# Patient Record
Sex: FEMALE | Race: BLACK OR AFRICAN AMERICAN | Employment: FULL TIME | URBAN - METROPOLITAN AREA
[De-identification: names, ages, dates, MRNs, and addresses within clinical notes are randomized per-mention and may not be internally consistent; named-entity substitution may affect disease eponyms.]

---

## 2017-01-09 ENCOUNTER — OFFICE VISIT (OUTPATIENT)
Dept: ENT CLINIC | Age: 52
End: 2017-01-09

## 2017-01-09 VITALS
WEIGHT: 148 LBS | BODY MASS INDEX: 24.66 KG/M2 | SYSTOLIC BLOOD PRESSURE: 101 MMHG | TEMPERATURE: 98.1 F | DIASTOLIC BLOOD PRESSURE: 58 MMHG | HEIGHT: 65 IN

## 2017-01-09 DIAGNOSIS — R49.9 HOARSENESS OR CHANGING VOICE: ICD-10-CM

## 2017-01-09 DIAGNOSIS — K21.9 LARYNGOPHARYNGEAL REFLUX (LPR): ICD-10-CM

## 2017-01-09 DIAGNOSIS — J00 ACUTE RHINITIS: ICD-10-CM

## 2017-01-09 PROCEDURE — 31575 DIAGNOSTIC LARYNGOSCOPY: CPT | Performed by: OTOLARYNGOLOGY

## 2017-01-09 RX ORDER — AZITHROMYCIN 250 MG/1
TABLET, FILM COATED ORAL
Qty: 1 PACKET | Refills: 0 | Status: SHIPPED | OUTPATIENT
Start: 2017-01-09 | End: 2017-01-19

## 2017-01-09 RX ORDER — OMEPRAZOLE 40 MG/1
40 CAPSULE, DELAYED RELEASE ORAL DAILY
Qty: 30 CAPSULE | Refills: 3 | Status: SHIPPED | OUTPATIENT
Start: 2017-01-09 | End: 2017-07-10

## 2017-02-20 ENCOUNTER — TELEPHONE (OUTPATIENT)
Dept: INTERNAL MEDICINE CLINIC | Age: 52
End: 2017-02-20

## 2017-07-10 ENCOUNTER — OFFICE VISIT (OUTPATIENT)
Dept: INTERNAL MEDICINE CLINIC | Age: 52
End: 2017-07-10

## 2017-07-10 VITALS
HEART RATE: 72 BPM | TEMPERATURE: 97.2 F | HEIGHT: 65 IN | DIASTOLIC BLOOD PRESSURE: 78 MMHG | BODY MASS INDEX: 24.91 KG/M2 | WEIGHT: 149.5 LBS | SYSTOLIC BLOOD PRESSURE: 120 MMHG | OXYGEN SATURATION: 99 %

## 2017-07-10 DIAGNOSIS — R35.0 URINARY FREQUENCY: ICD-10-CM

## 2017-07-10 DIAGNOSIS — E78.49 OTHER HYPERLIPIDEMIA: ICD-10-CM

## 2017-07-10 DIAGNOSIS — M54.16 ACUTE RADICULAR LOW BACK PAIN: Primary | ICD-10-CM

## 2017-07-10 DIAGNOSIS — E55.9 VITAMIN D DEFICIENCY: ICD-10-CM

## 2017-07-10 DIAGNOSIS — K59.09 OTHER CONSTIPATION: ICD-10-CM

## 2017-07-10 PROBLEM — K59.00 CONSTIPATION: Status: ACTIVE | Noted: 2017-07-10

## 2017-07-10 LAB
BILIRUBIN, POC: NORMAL
BLOOD URINE, POC: NORMAL
CLARITY, POC: CLEAR
COLOR, POC: YELLOW
GLUCOSE URINE, POC: NORMAL
KETONES, POC: NORMAL
LEUKOCYTE EST, POC: NORMAL
NITRITE, POC: NORMAL
PH, POC: 6
PROTEIN, POC: NORMAL
SPECIFIC GRAVITY, POC: 1.02
UROBILINOGEN, POC: 0.2

## 2017-07-10 PROCEDURE — 81002 URINALYSIS NONAUTO W/O SCOPE: CPT | Performed by: INTERNAL MEDICINE

## 2017-07-10 PROCEDURE — 96372 THER/PROPH/DIAG INJ SC/IM: CPT | Performed by: INTERNAL MEDICINE

## 2017-07-10 PROCEDURE — 99214 OFFICE O/P EST MOD 30 MIN: CPT | Performed by: INTERNAL MEDICINE

## 2017-07-10 RX ORDER — TRIAMCINOLONE ACETONIDE 40 MG/ML
40 INJECTION, SUSPENSION INTRA-ARTICULAR; INTRAMUSCULAR ONCE
Status: COMPLETED | OUTPATIENT
Start: 2017-07-10 | End: 2017-07-10

## 2017-07-10 RX ORDER — KETOROLAC TROMETHAMINE 30 MG/ML
60 INJECTION, SOLUTION INTRAMUSCULAR; INTRAVENOUS ONCE
Status: COMPLETED | OUTPATIENT
Start: 2017-07-10 | End: 2017-07-10

## 2017-07-10 RX ORDER — BACLOFEN 10 MG/1
10 TABLET ORAL 3 TIMES DAILY PRN
Qty: 30 TABLET | Refills: 0 | Status: SHIPPED | OUTPATIENT
Start: 2017-07-10 | End: 2017-08-30 | Stop reason: SDUPTHER

## 2017-07-10 RX ORDER — MULTIVIT-MIN/IRON/FOLIC ACID/K 18-600-40
1 CAPSULE ORAL DAILY
Qty: 30 CAPSULE | Refills: 1 | Status: SHIPPED | OUTPATIENT
Start: 2017-07-10 | End: 2017-10-09 | Stop reason: SDUPTHER

## 2017-07-10 RX ORDER — DICLOFENAC SODIUM 75 MG/1
75 TABLET, DELAYED RELEASE ORAL 2 TIMES DAILY PRN
Qty: 60 TABLET | Refills: 0 | Status: SHIPPED | OUTPATIENT
Start: 2017-07-10 | End: 2017-08-30 | Stop reason: SDUPTHER

## 2017-07-10 RX ADMIN — TRIAMCINOLONE ACETONIDE 40 MG: 40 INJECTION, SUSPENSION INTRA-ARTICULAR; INTRAMUSCULAR at 14:04

## 2017-07-10 RX ADMIN — KETOROLAC TROMETHAMINE 60 MG: 30 INJECTION, SOLUTION INTRAMUSCULAR; INTRAVENOUS at 14:03

## 2017-07-10 ASSESSMENT — PATIENT HEALTH QUESTIONNAIRE - PHQ9
SUM OF ALL RESPONSES TO PHQ9 QUESTIONS 1 & 2: 0
1. LITTLE INTEREST OR PLEASURE IN DOING THINGS: 0
2. FEELING DOWN, DEPRESSED OR HOPELESS: 0
SUM OF ALL RESPONSES TO PHQ QUESTIONS 1-9: 0

## 2017-07-11 ENCOUNTER — HOSPITAL ENCOUNTER (OUTPATIENT)
Dept: MRI IMAGING | Age: 52
Discharge: HOME | End: 2017-07-11
Attending: INTERNAL MEDICINE | Admitting: INTERNAL MEDICINE

## 2017-07-11 DIAGNOSIS — M54.16 ACUTE RADICULAR LOW BACK PAIN: ICD-10-CM

## 2017-07-11 DIAGNOSIS — M54.10 RADICULOPATHY: ICD-10-CM

## 2017-07-12 ENCOUNTER — TELEPHONE (OUTPATIENT)
Dept: INTERNAL MEDICINE CLINIC | Age: 52
End: 2017-07-12

## 2017-07-12 DIAGNOSIS — M54.16 ACUTE RADICULAR LOW BACK PAIN: Primary | ICD-10-CM

## 2017-07-14 ENCOUNTER — TELEPHONE (OUTPATIENT)
Dept: INTERNAL MEDICINE CLINIC | Age: 52
End: 2017-07-14

## 2017-07-14 RX ORDER — GABAPENTIN 300 MG/1
300 CAPSULE ORAL 2 TIMES DAILY
Qty: 60 CAPSULE | Refills: 0 | Status: SHIPPED | OUTPATIENT
Start: 2017-07-14 | End: 2017-08-30 | Stop reason: SDUPTHER

## 2017-07-24 ENCOUNTER — HOSPITAL ENCOUNTER (OUTPATIENT)
Dept: OTHER | Age: 52
Discharge: HOME | End: 2017-07-24
Attending: NEUROLOGICAL SURGERY | Admitting: NEUROLOGICAL SURGERY

## 2017-07-24 DIAGNOSIS — M54.5 CHRONIC MIDLINE LOW BACK PAIN, WITH SCIATICA PRESENCE UNSPECIFIED: ICD-10-CM

## 2017-07-24 DIAGNOSIS — G89.29 CHRONIC MIDLINE LOW BACK PAIN, WITH SCIATICA PRESENCE UNSPECIFIED: ICD-10-CM

## 2017-08-29 ENCOUNTER — PATIENT MESSAGE (OUTPATIENT)
Dept: INTERNAL MEDICINE CLINIC | Age: 52
End: 2017-08-29

## 2017-08-29 DIAGNOSIS — M54.16 ACUTE RADICULAR LOW BACK PAIN: ICD-10-CM

## 2017-08-30 RX ORDER — DICLOFENAC SODIUM 75 MG/1
75 TABLET, DELAYED RELEASE ORAL 2 TIMES DAILY PRN
Qty: 60 TABLET | Refills: 0 | Status: SHIPPED | OUTPATIENT
Start: 2017-08-30 | End: 2017-09-20

## 2017-08-30 RX ORDER — GABAPENTIN 300 MG/1
300 CAPSULE ORAL 2 TIMES DAILY
Qty: 60 CAPSULE | Refills: 0 | Status: SHIPPED | OUTPATIENT
Start: 2017-08-30 | End: 2017-09-20

## 2017-08-30 RX ORDER — BACLOFEN 10 MG/1
10 TABLET ORAL 3 TIMES DAILY PRN
Qty: 30 TABLET | Refills: 0 | Status: SHIPPED | OUTPATIENT
Start: 2017-08-30 | End: 2017-10-23

## 2017-09-19 ENCOUNTER — TELEPHONE (OUTPATIENT)
Dept: INTERNAL MEDICINE CLINIC | Age: 52
End: 2017-09-19

## 2017-09-20 ENCOUNTER — OFFICE VISIT (OUTPATIENT)
Dept: PRIMARY CARE CLINIC | Age: 52
End: 2017-09-20

## 2017-09-20 VITALS
WEIGHT: 147 LBS | HEART RATE: 69 BPM | RESPIRATION RATE: 18 BRPM | OXYGEN SATURATION: 100 % | TEMPERATURE: 98.5 F | DIASTOLIC BLOOD PRESSURE: 72 MMHG | BODY MASS INDEX: 24.46 KG/M2 | SYSTOLIC BLOOD PRESSURE: 108 MMHG

## 2017-09-20 DIAGNOSIS — N64.4 BREAST PAIN, LEFT: ICD-10-CM

## 2017-09-20 DIAGNOSIS — Z12.11 COLON CANCER SCREENING: ICD-10-CM

## 2017-09-20 DIAGNOSIS — Z00.00 VISIT FOR PREVENTIVE HEALTH EXAMINATION: Primary | ICD-10-CM

## 2017-09-20 DIAGNOSIS — Z12.31 VISIT FOR SCREENING MAMMOGRAM: ICD-10-CM

## 2017-09-20 DIAGNOSIS — Z12.4 CERVICAL CANCER SCREENING: ICD-10-CM

## 2017-09-20 DIAGNOSIS — R49.0 HOARSENESS OF VOICE: ICD-10-CM

## 2017-09-20 DIAGNOSIS — M54.32 LEFT SIDED SCIATICA: ICD-10-CM

## 2017-09-20 DIAGNOSIS — Z00.00 VISIT FOR PREVENTIVE HEALTH EXAMINATION: ICD-10-CM

## 2017-09-20 LAB
A/G RATIO: 2.1 (ref 1.1–2.2)
ALBUMIN SERPL-MCNC: 4.5 G/DL (ref 3.4–5)
ALP BLD-CCNC: 30 U/L (ref 40–129)
ALT SERPL-CCNC: 19 U/L (ref 10–40)
ANION GAP SERPL CALCULATED.3IONS-SCNC: 12 MMOL/L (ref 3–16)
AST SERPL-CCNC: 18 U/L (ref 15–37)
BACTERIA: ABNORMAL /HPF
BASOPHILS ABSOLUTE: 0 K/UL (ref 0–0.2)
BASOPHILS RELATIVE PERCENT: 0.5 %
BILIRUB SERPL-MCNC: 1.2 MG/DL (ref 0–1)
BILIRUBIN URINE: NEGATIVE
BLOOD, URINE: NEGATIVE
BUN BLDV-MCNC: 16 MG/DL (ref 7–20)
CALCIUM SERPL-MCNC: 9.4 MG/DL (ref 8.3–10.6)
CHLORIDE BLD-SCNC: 102 MMOL/L (ref 99–110)
CHOLESTEROL, TOTAL: 234 MG/DL (ref 0–199)
CLARITY: ABNORMAL
CO2: 28 MMOL/L (ref 21–32)
COLOR: ABNORMAL
CREAT SERPL-MCNC: 0.5 MG/DL (ref 0.6–1.1)
EOSINOPHILS ABSOLUTE: 0 K/UL (ref 0–0.6)
EOSINOPHILS RELATIVE PERCENT: 1.3 %
EPITHELIAL CELLS, UA: 12 /HPF (ref 0–5)
GFR AFRICAN AMERICAN: >60
GFR NON-AFRICAN AMERICAN: >60
GLOBULIN: 2.1 G/DL
GLUCOSE BLD-MCNC: 80 MG/DL (ref 70–99)
GLUCOSE URINE: NEGATIVE MG/DL
HCT VFR BLD CALC: 41 % (ref 36–48)
HDLC SERPL-MCNC: 117 MG/DL (ref 40–60)
HEMOGLOBIN: 13.5 G/DL (ref 12–16)
HYALINE CASTS: 7 /LPF (ref 0–8)
KETONES, URINE: NEGATIVE MG/DL
LDL CHOLESTEROL CALCULATED: 104 MG/DL
LEUKOCYTE ESTERASE, URINE: NEGATIVE
LYMPHOCYTES ABSOLUTE: 1.4 K/UL (ref 1–5.1)
LYMPHOCYTES RELATIVE PERCENT: 43.6 %
MCH RBC QN AUTO: 31.4 PG (ref 26–34)
MCHC RBC AUTO-ENTMCNC: 32.9 G/DL (ref 31–36)
MCV RBC AUTO: 95.2 FL (ref 80–100)
MICROSCOPIC EXAMINATION: YES
MONOCYTES ABSOLUTE: 0.2 K/UL (ref 0–1.3)
MONOCYTES RELATIVE PERCENT: 7.5 %
NEUTROPHILS ABSOLUTE: 1.5 K/UL (ref 1.7–7.7)
NEUTROPHILS RELATIVE PERCENT: 47.1 %
NITRITE, URINE: NEGATIVE
PDW BLD-RTO: 14.1 % (ref 12.4–15.4)
PH UA: 6
PLATELET # BLD: 218 K/UL (ref 135–450)
PMV BLD AUTO: 8 FL (ref 5–10.5)
POTASSIUM SERPL-SCNC: 4.4 MMOL/L (ref 3.5–5.1)
PROTEIN UA: ABNORMAL MG/DL
RBC # BLD: 4.31 M/UL (ref 4–5.2)
RBC UA: 2 /HPF (ref 0–4)
SODIUM BLD-SCNC: 142 MMOL/L (ref 136–145)
SPECIFIC GRAVITY UA: 1.03
TOTAL PROTEIN: 6.6 G/DL (ref 6.4–8.2)
TRIGL SERPL-MCNC: 66 MG/DL (ref 0–150)
TSH REFLEX FT4: 1.64 UIU/ML (ref 0.27–4.2)
URINE TYPE: ABNORMAL
UROBILINOGEN, URINE: 0.2 E.U./DL
VITAMIN B-12: 974 PG/ML (ref 211–911)
VITAMIN D 25-HYDROXY: 41.6 NG/ML
VLDLC SERPL CALC-MCNC: 13 MG/DL
WBC # BLD: 3.2 K/UL (ref 4–11)
WBC UA: 7 /HPF (ref 0–5)

## 2017-09-20 PROCEDURE — 99396 PREV VISIT EST AGE 40-64: CPT | Performed by: INTERNAL MEDICINE

## 2017-09-20 RX ORDER — CELECOXIB 200 MG/1
200 CAPSULE ORAL DAILY
Qty: 30 CAPSULE | Refills: 3 | Status: SHIPPED | OUTPATIENT
Start: 2017-09-20 | End: 2017-10-09

## 2017-09-20 RX ORDER — TOPIRAMATE 50 MG/1
50 TABLET, FILM COATED ORAL 2 TIMES DAILY
Qty: 60 TABLET | Refills: 3 | Status: SHIPPED | OUTPATIENT
Start: 2017-09-20 | End: 2017-10-09 | Stop reason: SDUPTHER

## 2017-09-20 ASSESSMENT — ENCOUNTER SYMPTOMS
BACK PAIN: 1
RESPIRATORY NEGATIVE: 1
EYES NEGATIVE: 1
GASTROINTESTINAL NEGATIVE: 1
ALLERGIC/IMMUNOLOGIC NEGATIVE: 1

## 2017-09-20 ASSESSMENT — PATIENT HEALTH QUESTIONNAIRE - PHQ9
1. LITTLE INTEREST OR PLEASURE IN DOING THINGS: 0
SUM OF ALL RESPONSES TO PHQ9 QUESTIONS 1 & 2: 0
2. FEELING DOWN, DEPRESSED OR HOPELESS: 0
SUM OF ALL RESPONSES TO PHQ QUESTIONS 1-9: 0

## 2017-09-21 DIAGNOSIS — R82.81 PYURIA: Primary | ICD-10-CM

## 2017-09-21 LAB
ESTIMATED AVERAGE GLUCOSE: 108.3 MG/DL
HBA1C MFR BLD: 5.4 %

## 2017-09-29 DIAGNOSIS — M54.32 LEFT SIDED SCIATICA: Primary | ICD-10-CM

## 2017-10-03 DIAGNOSIS — L29.9 ITCHING: Primary | ICD-10-CM

## 2017-10-03 DIAGNOSIS — R17 ELEVATED BILIRUBIN: ICD-10-CM

## 2017-10-03 RX ORDER — MOMETASONE FUROATE 50 UG/1
2 SPRAY, METERED NASAL DAILY
Qty: 1 INHALER | Refills: 1 | Status: SHIPPED | OUTPATIENT
Start: 2017-10-03 | End: 2017-10-09 | Stop reason: SDUPTHER

## 2017-10-03 RX ORDER — CETIRIZINE HYDROCHLORIDE 10 MG/1
10 TABLET ORAL DAILY PRN
Qty: 30 TABLET | Refills: 3 | Status: SHIPPED | OUTPATIENT
Start: 2017-10-03 | End: 2017-10-09 | Stop reason: SDUPTHER

## 2017-10-03 RX ORDER — RANITIDINE 150 MG/1
150 TABLET ORAL 2 TIMES DAILY
Qty: 60 TABLET | Refills: 3 | Status: SHIPPED | OUTPATIENT
Start: 2017-10-03 | End: 2017-10-09 | Stop reason: SDUPTHER

## 2017-10-09 DIAGNOSIS — M54.16 ACUTE RADICULAR LOW BACK PAIN: ICD-10-CM

## 2017-10-09 DIAGNOSIS — R17 ELEVATED BILIRUBIN: ICD-10-CM

## 2017-10-09 DIAGNOSIS — M54.32 LEFT SIDED SCIATICA: ICD-10-CM

## 2017-10-09 DIAGNOSIS — L29.9 ITCHING: ICD-10-CM

## 2017-10-09 DIAGNOSIS — E55.9 VITAMIN D DEFICIENCY: ICD-10-CM

## 2017-10-09 LAB
BILIRUB SERPL-MCNC: 1.4 MG/DL (ref 0–1)
BILIRUBIN DIRECT: 0.3 MG/DL (ref 0–0.3)
BILIRUBIN, INDIRECT: 1.1 MG/DL (ref 0–1)

## 2017-10-09 RX ORDER — MULTIVIT-MIN/IRON/FOLIC ACID/K 18-600-40
1 CAPSULE ORAL DAILY
Qty: 30 CAPSULE | Refills: 1 | Status: SHIPPED | OUTPATIENT
Start: 2017-10-09 | End: 2018-06-29 | Stop reason: SDUPTHER

## 2017-10-09 RX ORDER — MOMETASONE FUROATE 50 UG/1
2 SPRAY, METERED NASAL DAILY
Qty: 1 INHALER | Refills: 1 | Status: SHIPPED | OUTPATIENT
Start: 2017-10-09 | End: 2018-09-05

## 2017-10-09 RX ORDER — RANITIDINE 150 MG/1
150 TABLET ORAL 2 TIMES DAILY
Qty: 60 TABLET | Refills: 3 | Status: SHIPPED | OUTPATIENT
Start: 2017-10-09 | End: 2018-02-22 | Stop reason: SDUPTHER

## 2017-10-09 RX ORDER — TOPIRAMATE 50 MG/1
50 TABLET, FILM COATED ORAL 2 TIMES DAILY
Qty: 60 TABLET | Refills: 3 | Status: SHIPPED | OUTPATIENT
Start: 2017-10-09 | End: 2017-10-19 | Stop reason: SDUPTHER

## 2017-10-09 RX ORDER — CETIRIZINE HYDROCHLORIDE 10 MG/1
10 TABLET ORAL DAILY PRN
Qty: 30 TABLET | Refills: 3 | Status: SHIPPED | OUTPATIENT
Start: 2017-10-09 | End: 2017-12-28 | Stop reason: SDUPTHER

## 2017-10-11 DIAGNOSIS — M54.32 LEFT SIDED SCIATICA: Primary | ICD-10-CM

## 2017-10-11 LAB
2000687N OAK TREE IGE: 1.12 KU/L
ALLERGEN ASPERGILLUS ALTERNATA IGE: <0.1 KU/L
ALLERGEN ASPERGILLUS FUMIGATUS IGE: <0.1 KU/L
ALLERGEN BERMUDA GRASS IGE: 1.08 KU/L
ALLERGEN BIRCH IGE: 1.19 KU/L
ALLERGEN CAT DANDER IGE: 0.38 KU/L
ALLERGEN CLAMS IGE: <0.1 KU/L
ALLERGEN CODFISH IGE: <0.1 KU/L
ALLERGEN COMMON SHORT RAGWEED IGE: 1.46 KU/L
ALLERGEN CORN IGE: 0.19 KU/L
ALLERGEN COTTONWOOD: 1.21 KU/L
ALLERGEN COW MILK IGE: <0.1 KU/L
ALLERGEN DOG DANDER IGE: <0.1 KU/L
ALLERGEN EGG WHITE IGE: <0.1 KU/L
ALLERGEN ELM IGE: 1.54 KU/L
ALLERGEN FUNGI/MOLD M.RACEMOSUS IGE: <0.1 KU/L
ALLERGEN GERMAN COCKROACH IGE: <0.1 KU/L
ALLERGEN HORMODENDRUM HORDEI IGE: <0.1 KU/L
ALLERGEN MAPLE/BOX ELDER IGE: 4.14 KU/L
ALLERGEN MITE DUST FARINAE IGE: <0.1 KU/L
ALLERGEN MITE DUST PTERONYSSINUS IGE: <0.1 KU/L
ALLERGEN MOUNTAIN CEDAR: 0.28 KU/L
ALLERGEN MOUSE EPITHELIA IGE: <0.1 KU/L
ALLERGEN PEANUT (F13) IGE: 0.43 KU/L
ALLERGEN PECAN TREE IGE: 2.58 KU/L
ALLERGEN PENICILLIUM NOTATUM: <0.1 KU/L
ALLERGEN ROUGH PIGWEED (W14) IGE: 0.95 KU/L
ALLERGEN RUSSIAN THISTLE IGE: 0.77 KU/L
ALLERGEN SCALLOP IGE: <0.1 KU/L
ALLERGEN SEE NOTE: ABNORMAL
ALLERGEN SHEEP SORREL (W18) IGE: 1.25 KU/L
ALLERGEN SHRIMP IGE: <0.1 KU/L
ALLERGEN SOYBEAN IGE: <0.1 KU/L
ALLERGEN TIMOTHY GRASS: 0.6 KU/L
ALLERGEN TREE SYCAMORE: 0.83 KU/L
ALLERGEN WALNUT IGE: 0.11 KU/L
ALLERGEN WALNUT TREE IGE: 2.22 KU/L
ALLERGEN WHEAT IGE: 0.17 KU/L
ALLERGEN WHITE MULBERRY TREE, IGE: <0.1 KU/L
ALLERGEN, TREE, WHITE ASH IGE: 3.35 KU/L
IGE: 32 KU/L

## 2017-10-12 ENCOUNTER — TELEPHONE (OUTPATIENT)
Dept: PRIMARY CARE CLINIC | Age: 52
End: 2017-10-12

## 2017-10-12 NOTE — TELEPHONE ENCOUNTER
Patient wants to know if you can give her something for pain or a shot. Pain is crying stating she is excruciating pain.   Please advise

## 2017-10-19 DIAGNOSIS — M54.32 LEFT SIDED SCIATICA: ICD-10-CM

## 2017-10-19 RX ORDER — TOPIRAMATE 50 MG/1
50 TABLET, FILM COATED ORAL 2 TIMES DAILY
Qty: 60 TABLET | Refills: 3 | Status: SHIPPED | OUTPATIENT
Start: 2017-10-19 | End: 2017-10-23

## 2017-10-19 NOTE — TELEPHONE ENCOUNTER
From: Teri Donaldson  Sent: 10/19/2017 10:34 AM EDT  Subject: Medication Renewal Request    Teri Donaldson would like a refill of the following medications:  topiramate (TOPAMAX) 50 MG tablet Jez Wood MD]    Preferred pharmacy: [facility] [phone] - F [phone]    Comment:  I need more refills on baclofen and Topamax please. Thanks Dr. Rani Madrid.     Medication renewals requested in this message routed separately:  baclofen (LIORESAL) 10 MG tablet Niles Lopez MD]

## 2017-10-20 RX ORDER — BACLOFEN 10 MG/1
10 TABLET ORAL 3 TIMES DAILY
Qty: 90 TABLET | Refills: 5 | Status: SHIPPED | OUTPATIENT
Start: 2017-10-20 | End: 2017-12-06

## 2017-10-23 ENCOUNTER — OFFICE VISIT (OUTPATIENT)
Dept: PRIMARY CARE CLINIC | Age: 52
End: 2017-10-23

## 2017-10-23 VITALS
RESPIRATION RATE: 18 BRPM | WEIGHT: 141 LBS | DIASTOLIC BLOOD PRESSURE: 78 MMHG | OXYGEN SATURATION: 100 % | TEMPERATURE: 97.8 F | BODY MASS INDEX: 23.46 KG/M2 | HEART RATE: 71 BPM | SYSTOLIC BLOOD PRESSURE: 120 MMHG

## 2017-10-23 DIAGNOSIS — Z01.818 PRE-OP EXAMINATION: Primary | ICD-10-CM

## 2017-10-23 DIAGNOSIS — R06.02 SHORTNESS OF BREATH: ICD-10-CM

## 2017-10-23 DIAGNOSIS — M54.32 LEFT SIDED SCIATICA: ICD-10-CM

## 2017-10-23 LAB
A/G RATIO: 2.3 (ref 1.1–2.2)
ALBUMIN SERPL-MCNC: 4.8 G/DL (ref 3.4–5)
ALP BLD-CCNC: 33 U/L (ref 40–129)
ALT SERPL-CCNC: 20 U/L (ref 10–40)
ANION GAP SERPL CALCULATED.3IONS-SCNC: 14 MMOL/L (ref 3–16)
AST SERPL-CCNC: 15 U/L (ref 15–37)
BASOPHILS ABSOLUTE: 0 K/UL (ref 0–0.2)
BASOPHILS RELATIVE PERCENT: 0.6 %
BILIRUB SERPL-MCNC: 1.2 MG/DL (ref 0–1)
BILIRUBIN URINE: NEGATIVE
BLOOD, URINE: NEGATIVE
BUN BLDV-MCNC: 16 MG/DL (ref 7–20)
CALCIUM SERPL-MCNC: 9.6 MG/DL (ref 8.3–10.6)
CHLORIDE BLD-SCNC: 104 MMOL/L (ref 99–110)
CLARITY: CLEAR
CO2: 25 MMOL/L (ref 21–32)
COLOR: YELLOW
CREAT SERPL-MCNC: 0.6 MG/DL (ref 0.6–1.1)
D DIMER: <200 NG/ML DDU (ref 0–229)
EOSINOPHILS ABSOLUTE: 0 K/UL (ref 0–0.6)
EOSINOPHILS RELATIVE PERCENT: 0.8 %
GFR AFRICAN AMERICAN: >60
GFR NON-AFRICAN AMERICAN: >60
GLOBULIN: 2.1 G/DL
GLUCOSE BLD-MCNC: 88 MG/DL (ref 70–99)
GLUCOSE URINE: NEGATIVE MG/DL
HCT VFR BLD CALC: 42.4 % (ref 36–48)
HEMOGLOBIN: 14.2 G/DL (ref 12–16)
KETONES, URINE: NEGATIVE MG/DL
LEUKOCYTE ESTERASE, URINE: NEGATIVE
LYMPHOCYTES ABSOLUTE: 2.4 K/UL (ref 1–5.1)
LYMPHOCYTES RELATIVE PERCENT: 48.8 %
MCH RBC QN AUTO: 31.2 PG (ref 26–34)
MCHC RBC AUTO-ENTMCNC: 33.6 G/DL (ref 31–36)
MCV RBC AUTO: 93 FL (ref 80–100)
MICROSCOPIC EXAMINATION: NORMAL
MONOCYTES ABSOLUTE: 0.3 K/UL (ref 0–1.3)
MONOCYTES RELATIVE PERCENT: 6.6 %
NEUTROPHILS ABSOLUTE: 2.1 K/UL (ref 1.7–7.7)
NEUTROPHILS RELATIVE PERCENT: 43.2 %
NITRITE, URINE: NEGATIVE
PDW BLD-RTO: 12.9 % (ref 12.4–15.4)
PH UA: 6
PLATELET # BLD: 265 K/UL (ref 135–450)
PMV BLD AUTO: 8.2 FL (ref 5–10.5)
POTASSIUM SERPL-SCNC: 3.9 MMOL/L (ref 3.5–5.1)
PROTEIN UA: NEGATIVE MG/DL
RBC # BLD: 4.56 M/UL (ref 4–5.2)
SODIUM BLD-SCNC: 143 MMOL/L (ref 136–145)
SPECIFIC GRAVITY UA: 1.03
TOTAL PROTEIN: 6.9 G/DL (ref 6.4–8.2)
URINE TYPE: NORMAL
UROBILINOGEN, URINE: 0.2 E.U./DL
WBC # BLD: 4.9 K/UL (ref 4–11)

## 2017-10-23 PROCEDURE — 99242 OFF/OP CONSLTJ NEW/EST SF 20: CPT | Performed by: INTERNAL MEDICINE

## 2017-10-23 RX ORDER — TOPIRAMATE 100 MG/1
100 TABLET, FILM COATED ORAL 2 TIMES DAILY
Qty: 60 TABLET | Refills: 5 | Status: SHIPPED | OUTPATIENT
Start: 2017-10-23 | End: 2017-12-06 | Stop reason: SDUPTHER

## 2017-10-23 ASSESSMENT — ENCOUNTER SYMPTOMS
BACK PAIN: 1
COUGH: 0
SHORTNESS OF BREATH: 1
GASTROINTESTINAL NEGATIVE: 1
EYES NEGATIVE: 1

## 2017-10-23 NOTE — PROGRESS NOTES
distress. She has no wheezes. She has no rales. She exhibits no tenderness. Normal breast exam.   Abdominal: Soft. Bowel sounds are normal. She exhibits no distension and no mass. There is no tenderness. There is no rebound and no guarding. Musculoskeletal: She exhibits no edema, tenderness or deformity. Lymphadenopathy:     She has no cervical adenopathy. Neurological: She is alert and oriented to person, place, and time. She displays normal reflexes. No cranial nerve deficit. She exhibits normal muscle tone. Coordination normal.   Skin: Skin is warm and dry. No rash noted. She is not diaphoretic. No erythema. No pallor. Psychiatric: She has a normal mood and affect. Her behavior is normal. Judgment and thought content normal.       Assessment:      1. Pre-op examination , due to shortness of breath, will check chest xray, pft's, echo, ekg. To further evaluate. Lab Results   Component Value Date    CHOL 234 (H) 09/20/2017    CHOL 205 (H) 10/24/2016    CHOL 253 (H) 02/12/2016     Lab Results   Component Value Date    TRIG 66 09/20/2017    TRIG 46 10/24/2016    TRIG 35 02/12/2016     Lab Results   Component Value Date     (H) 09/20/2017     (H) 10/24/2016     (H) 02/12/2016     Lab Results   Component Value Date    LDLCALC 104 (H) 09/20/2017    LDLCALC 91 10/24/2016    LDLCALC 139 (H) 02/12/2016     Lab Results   Component Value Date    LABVLDL 13 09/20/2017    LABVLDL 9 10/24/2016    LABVLDL 7 02/12/2016           2. Shortness of breath , see above. 3. Left sided sciatica . Proceed with surgery , once pre-op clearance completed. Plan:   Walker Cast received counseling on the following healthy behaviors: medication adherence    Patient given educational materials on After visit summary with diagnosis and treatment plan.       I have instructed Nolanon Serene to complete a self tracking handout on Blood Pressures  and Weights and instructed them to bring it with them to her next appointment. Discussed use, benefit, and side effects of prescribed medications. Barriers to medication compliance addressed. All patient questions answered. Pt voiced understanding. Patient is taking over the counter meds and discussed as to how they interact with prescription medications.

## 2017-10-24 RX ORDER — ALBUTEROL SULFATE 90 UG/1
2 AEROSOL, METERED RESPIRATORY (INHALATION) EVERY 6 HOURS PRN
Qty: 1 INHALER | Refills: 3 | Status: SHIPPED | OUTPATIENT
Start: 2017-10-24 | End: 2020-08-03 | Stop reason: SDUPTHER

## 2017-10-25 DIAGNOSIS — R17 ELEVATED BILIRUBIN: Primary | ICD-10-CM

## 2017-10-27 ENCOUNTER — HOSPITAL ENCOUNTER (OUTPATIENT)
Dept: NON INVASIVE DIAGNOSTICS | Age: 52
Discharge: HOME | End: 2017-10-27
Attending: PHYSICIAN ASSISTANT | Admitting: PHYSICIAN ASSISTANT

## 2017-10-27 DIAGNOSIS — Z01.818 ENCOUNTER FOR OTHER PREPROCEDURAL EXAMINATION: ICD-10-CM

## 2017-10-27 LAB
EKG ATRIAL RATE: 63 BPM
EKG DIAGNOSIS: NORMAL
EKG P AXIS: 48 DEGREES
EKG P-R INTERVAL: 158 MS
EKG Q-T INTERVAL: 404 MS
EKG QRS DURATION: 82 MS
EKG QTC CALCULATION (BAZETT): 413 MS
EKG R AXIS: -10 DEGREES
EKG T AXIS: 26 DEGREES
EKG VENTRICULAR RATE: 63 BPM
LV EF: 63 %
LVEF MODALITY: NORMAL

## 2017-10-27 PROCEDURE — 93010 ELECTROCARDIOGRAM REPORT: CPT | Performed by: INTERNAL MEDICINE

## 2017-10-30 ENCOUNTER — HOSPITAL ENCOUNTER (OUTPATIENT)
Dept: PULMONOLOGY | Age: 52
Discharge: HOME | End: 2017-10-30
Attending: INTERNAL MEDICINE | Admitting: INTERNAL MEDICINE

## 2017-10-30 DIAGNOSIS — Z01.818 ENCOUNTER FOR OTHER PREPROCEDURAL EXAMINATION: ICD-10-CM

## 2017-10-30 DIAGNOSIS — Z01.818 PRE-OP EXAMINATION: ICD-10-CM

## 2017-10-30 LAB
DLCO %PRED: NORMAL
DLCO PRE: NORMAL
FEF 25-75%-POST: NORMAL
FEF 25-75%-PRE: NORMAL
FEV1-POST: NORMAL
FEV1-PRE: NORMAL
FEV1/FVC-POST: NORMAL
FEV1/FVC-PRE: NORMAL
FVC-POST: NORMAL
FVC-PRE: NORMAL
MEP: NORMAL
MIP: NORMAL
MVV %PRED-PRE: NORMAL
MVV-PRE: NORMAL
TLC %PRED: NORMAL
TLC PRE: NORMAL

## 2017-10-30 PROCEDURE — 94060 EVALUATION OF WHEEZING: CPT | Performed by: INTERNAL MEDICINE

## 2017-10-30 PROCEDURE — 94729 DIFFUSING CAPACITY: CPT | Performed by: INTERNAL MEDICINE

## 2017-10-30 PROCEDURE — 94727 GAS DIL/WSHOT DETER LNG VOL: CPT | Performed by: INTERNAL MEDICINE

## 2017-10-30 RX ORDER — ALBUTEROL SULFATE 90 UG/1
4 AEROSOL, METERED RESPIRATORY (INHALATION) ONCE
Status: COMPLETED | OUTPATIENT
Start: 2017-10-30 | End: 2017-10-30

## 2017-10-30 RX ADMIN — ALBUTEROL SULFATE 4 PUFF: 90 AEROSOL, METERED RESPIRATORY (INHALATION) at 09:25

## 2017-10-30 NOTE — PROCEDURES
Spirometry  1. Spirometry is normal.  2. There is no demonstrable obstructive defect. Lung Volumes  3. Lung volumes are normal.    Bronchodilator  1. There is a borderline response to bronchodilator. Flow-Volume Loop  4. The flow-volume loop is normal.    Diffusing Capacity  5. Diffusing capacity is normal.                  Overall Interpretation  No obstruction is present    No restriction is present    There is a borderline bronchodilator response present    Diffusion capacity is normal    FEV1 is 2.5 L at 99% predicted. FEV1/FVC ratio is 84    Normal study        OBSTRUCTION % Predicted FEV1   MILD >70%   MODERATE 60-69%   MODERATELY-SEVERE 50-59%   SEVERE 35-49%   VERY SEVERE <35%         RESTRICTION % Predicted TLC   MILD Less than LLN but > than 70%   MODERATE 60-69%   MODERATELY-SEVERE <60%                 DIFFUSION CAPACITY DL,CO % Pred   MILD >60% AND < LLN   MODERATE 40-60%   SEVERE <40%       PFT data will be scanned into the media tab in epic.     Yuni Shankar 112 Pulmonology

## 2017-11-02 ENCOUNTER — TELEPHONE (OUTPATIENT)
Dept: PRIMARY CARE CLINIC | Age: 52
End: 2017-11-02

## 2017-11-02 NOTE — TELEPHONE ENCOUNTER
Pt states she was recently in the ED, she passed out at work, she would like a call back to discuss with Dr. Katie Mojica

## 2017-11-02 NOTE — TELEPHONE ENCOUNTER
Pt wants to talk to you to see if you want her to stop taking topamax. She was having SOB and just passed out and she thinks its because of this med. She has a appt on mon and wonders if you want her to come in sooner to discuss.  She said she would like to talk to you personally

## 2017-11-03 ENCOUNTER — OFFICE VISIT (OUTPATIENT)
Dept: PRIMARY CARE CLINIC | Age: 52
End: 2017-11-03

## 2017-11-03 VITALS
WEIGHT: 138 LBS | SYSTOLIC BLOOD PRESSURE: 98 MMHG | TEMPERATURE: 98.6 F | OXYGEN SATURATION: 100 % | DIASTOLIC BLOOD PRESSURE: 60 MMHG | BODY MASS INDEX: 22.96 KG/M2 | HEART RATE: 90 BPM | RESPIRATION RATE: 18 BRPM

## 2017-11-03 DIAGNOSIS — R49.0 HOARSENESS: ICD-10-CM

## 2017-11-03 DIAGNOSIS — R55 SYNCOPE, UNSPECIFIED SYNCOPE TYPE: Primary | ICD-10-CM

## 2017-11-03 DIAGNOSIS — E87.6 HYPOKALEMIA: ICD-10-CM

## 2017-11-03 LAB
ALBUMIN SERPL-MCNC: 4.4 G/DL (ref 3.4–5)
ANION GAP SERPL CALCULATED.3IONS-SCNC: 11 MMOL/L (ref 3–16)
BUN BLDV-MCNC: 15 MG/DL (ref 7–20)
CALCIUM SERPL-MCNC: 9.4 MG/DL (ref 8.3–10.6)
CHLORIDE BLD-SCNC: 108 MMOL/L (ref 99–110)
CO2: 24 MMOL/L (ref 21–32)
CREAT SERPL-MCNC: 0.6 MG/DL (ref 0.6–1.1)
GFR AFRICAN AMERICAN: >60
GFR NON-AFRICAN AMERICAN: >60
GLUCOSE BLD-MCNC: 92 MG/DL (ref 70–99)
PHOSPHORUS: 3.2 MG/DL (ref 2.5–4.9)
POTASSIUM SERPL-SCNC: 4.3 MMOL/L (ref 3.5–5.1)
SODIUM BLD-SCNC: 143 MMOL/L (ref 136–145)
T3 FREE: 2.4 PG/ML (ref 2.3–4.2)

## 2017-11-03 PROCEDURE — 99213 OFFICE O/P EST LOW 20 MIN: CPT | Performed by: INTERNAL MEDICINE

## 2017-11-03 RX ORDER — POTASSIUM CHLORIDE 750 MG/1
10 TABLET, FILM COATED, EXTENDED RELEASE ORAL 2 TIMES DAILY
Qty: 60 TABLET | Refills: 3 | Status: SHIPPED | OUTPATIENT
Start: 2017-11-03 | End: 2018-07-23

## 2017-11-06 ENCOUNTER — OFFICE VISIT (OUTPATIENT)
Dept: PRIMARY CARE CLINIC | Age: 52
End: 2017-11-06

## 2017-11-06 ENCOUNTER — HOSPITAL ENCOUNTER (OUTPATIENT)
Dept: PREADMISSION TESTING | Age: 52
Discharge: HOME | End: 2017-11-06
Attending: NEUROLOGICAL SURGERY | Admitting: NEUROLOGICAL SURGERY

## 2017-11-06 ENCOUNTER — HOSPITAL ENCOUNTER (OUTPATIENT)
Dept: NON INVASIVE DIAGNOSTICS | Age: 52
Discharge: HOME | End: 2017-11-06
Attending: INTERNAL MEDICINE | Admitting: INTERNAL MEDICINE

## 2017-11-06 VITALS
TEMPERATURE: 98.9 F | HEART RATE: 75 BPM | OXYGEN SATURATION: 100 % | DIASTOLIC BLOOD PRESSURE: 73 MMHG | SYSTOLIC BLOOD PRESSURE: 114 MMHG

## 2017-11-06 VITALS — BODY MASS INDEX: 22.99 KG/M2 | WEIGHT: 138 LBS | HEIGHT: 65 IN

## 2017-11-06 DIAGNOSIS — R55 SYNCOPE AND COLLAPSE: ICD-10-CM

## 2017-11-06 DIAGNOSIS — M48.062 SPINAL STENOSIS OF LUMBAR REGION WITH NEUROGENIC CLAUDICATION: Primary | ICD-10-CM

## 2017-11-06 PROCEDURE — 99213 OFFICE O/P EST LOW 20 MIN: CPT | Performed by: INTERNAL MEDICINE

## 2017-11-06 RX ORDER — CHLORHEXIDINE GLUCONATE 0.12 MG/ML
15 RINSE ORAL 2 TIMES DAILY
Status: CANCELLED | OUTPATIENT
Start: 2017-11-09

## 2017-11-06 RX ORDER — CHLORHEXIDINE GLUCONATE 0.12 MG/ML
15 RINSE ORAL 2 TIMES DAILY
Status: CANCELLED | OUTPATIENT
Start: 2017-11-09 | End: 2017-11-07

## 2017-11-06 RX ORDER — CLINDAMYCIN PHOSPHATE 900 MG/50ML
900 INJECTION INTRAVENOUS ONCE
Status: CANCELLED | OUTPATIENT
Start: 2017-11-09 | End: 2017-11-06

## 2017-11-06 NOTE — PROGRESS NOTES
Subjective:      Patient ID: Teri Donaldson is a 49 y.o. female. HPI   Patient is here to complete pre-operative work up. No more shortness of breath with decreasing topamax. D-dimer and Pft's negative. Echo normal . Has cardiology appointment later today. Stable for surgery. Current Outpatient Prescriptions on File Prior to Visit   Medication Sig Dispense Refill    potassium chloride (K-TAB) 10 MEQ extended release tablet Take 1 tablet by mouth 2 times daily 60 tablet 3    albuterol sulfate HFA (VENTOLIN HFA) 108 (90 Base) MCG/ACT inhaler Inhale 2 puffs into the lungs every 6 hours as needed for Wheezing 1 Inhaler 3    topiramate (TOPAMAX) 100 MG tablet Take 1 tablet by mouth 2 times daily (Patient taking differently: Take 50 mg by mouth 2 times daily ) 60 tablet 5    baclofen (LIORESAL) 10 MG tablet Take 1 tablet by mouth 3 times daily 90 tablet 5    mometasone (NASONEX) 50 MCG/ACT nasal spray 2 sprays by Nasal route daily 1 Inhaler 1    cetirizine (ZYRTEC) 10 MG tablet Take 1 tablet by mouth daily as needed for Allergies 30 tablet 3    ranitidine (ZANTAC) 150 MG tablet Take 1 tablet by mouth 2 times daily 60 tablet 3    Cholecalciferol (VITAMIN D) 2000 units CAPS capsule Take 1 capsule by mouth daily 30 capsule 1    estradiol (ESTRACE) 2 MG tablet Take 2 mg by mouth daily      SUMAtriptan (IMITREX) 50 MG tablet Take 1 tablet by mouth every 2 hours as needed for Migraine (MAXIMUM 200 MG Q24H) 9 tablet 3    traZODone (DESYREL) 100 MG tablet Take 1 tablet by mouth nightly 30 tablet 3     No current facility-administered medications on file prior to visit.       Patient Active Problem List   Diagnosis    Fatigue    Allergic rhinitis    Knee pain, bilateral    Vitamin D deficiency    Insomnia    Urinary incontinence    Sinusitis    Hyperlipidemia    Shoulder pain, left    Neck pain    Constipation    Left sided sciatica    Encounter for other preprocedural examination    Lumbar stenosis Allergies   Allergen Reactions    Fruit Acid Concentrate      Tomato, strawberries    Penicillins Hives    Pollen Extract     Tomato Anaphylaxis     Vomiting  Vomiting    Bee Pollen     Fruit Extracts     Influenza Virus Vacc Split Pf     Influenza Vaccines Rash     ? RASH       Review of Systems   Constitutional: Positive for fatigue. HENT: Negative. Eyes: Negative. Respiratory: Negative. Cardiovascular: Negative. Endocrine: Negative. Genitourinary: Negative. Musculoskeletal:        Leg weakness with spinal claudication. Skin: Negative. Allergic/Immunologic: Negative. Neurological:        Leg weakness with spinal claudication. Hematological: Negative. Psychiatric/Behavioral: Negative. Objective:   Physical Exam   Constitutional: She is oriented to person, place, and time. Eyes: Conjunctivae and EOM are normal. Pupils are equal, round, and reactive to light. Left eye exhibits discharge. Pulmonary/Chest: Effort normal and breath sounds normal. No respiratory distress. She has no wheezes. She has no rales. She exhibits no tenderness. Abdominal: Soft. Bowel sounds are normal. She exhibits no distension and no mass. There is no tenderness. There is no rebound and no guarding. Neurological: She is alert and oriented to person, place, and time. Skin: Skin is warm and dry. No rash noted. No erythema. No pallor. Psychiatric: She has a normal mood and affect. Her behavior is normal. Thought content normal.       Assessment:        1. Spinal stenosis of lumbar region with neurogenic claudication  Central - Hola Sosa MD ([facility])      Completing pre-op work up, To see cardiology tomorrow. NO more shortness of breath  With negative d-dimer and echo and PFT. Mild elevated total bili , will repeat in  Am. Aubrie Cashing   Decreasing Topamax from 100 bid to 50 mg bid alleviated the fatigue and SOB  Plan:      Sarah Valero received counseling on the following healthy

## 2017-11-06 NOTE — PROGRESS NOTES
UNABLE TO REACH PATIENT X2. VM unavailable. . H&P, labs, and EKG on chart. Will do PT,PTT and type&screen DOS.
in effect during my hospitalization, the order may or may not be in effect during this procedure. I give my doctor permission to give me blood or blood products. I understand that there are risks with receiving blood such as hepatitis, AIDS, fever, or allergic reaction. I acknowledge that the risks, benefits, and alternatives of this treatment have been explained to me and that no express or implied warranty has been given by the hospital, any blood bank, or any person or entity as to the blood or blood components transfused. At the discretion of my doctor, I agree to allow observers, equipment/product representatives and allow photographing, and/or televising of the procedure, provided my name or identity is maintained confidentially. I agree the hospital may dispose of or use for scientific or educational purposes any tissue, fluid, or body parts which may be removed.     ________________________________Date________Time______ am/pm  ([place])  Patient or Signature of Closest Relative or Legal Guardian    ________________________________Date________Time______am/pm      Page 1 of  1  Witness

## 2017-11-07 ENCOUNTER — OFFICE VISIT (OUTPATIENT)
Dept: CARDIOLOGY CLINIC | Age: 52
End: 2017-11-07

## 2017-11-07 VITALS
DIASTOLIC BLOOD PRESSURE: 68 MMHG | HEIGHT: 65 IN | BODY MASS INDEX: 23.32 KG/M2 | SYSTOLIC BLOOD PRESSURE: 102 MMHG | HEART RATE: 74 BPM | WEIGHT: 140 LBS | OXYGEN SATURATION: 98 %

## 2017-11-07 DIAGNOSIS — R17 ELEVATED BILIRUBIN: ICD-10-CM

## 2017-11-07 DIAGNOSIS — Z01.810 PREOP CARDIOVASCULAR EXAM: Primary | ICD-10-CM

## 2017-11-07 DIAGNOSIS — R06.02 SHORTNESS OF BREATH: ICD-10-CM

## 2017-11-07 DIAGNOSIS — R55 SYNCOPE AND COLLAPSE: ICD-10-CM

## 2017-11-07 LAB
BILIRUB SERPL-MCNC: 1.2 MG/DL (ref 0–1)
BILIRUBIN DIRECT: <0.2 MG/DL (ref 0–0.3)
BILIRUBIN, INDIRECT: ABNORMAL MG/DL (ref 0–1)

## 2017-11-07 PROCEDURE — 93000 ELECTROCARDIOGRAM COMPLETE: CPT | Performed by: INTERNAL MEDICINE

## 2017-11-07 PROCEDURE — 99204 OFFICE O/P NEW MOD 45 MIN: CPT | Performed by: INTERNAL MEDICINE

## 2017-11-07 NOTE — PATIENT INSTRUCTIONS
Patient Education        Fainting: Care Instructions  Your Care Instructions    When you faint, or pass out, you lose consciousness for a short time. A brief drop in blood flow to the brain often causes it. When you fall or lie down, more blood flows to your brain and you regain consciousness. Emotional stress, pain, or overheatingespecially if you have been standingcan make you faint. In these cases, fainting is usually not serious. But fainting can be a sign of a more serious problem. Your doctor may want you to have more tests to rule out other causes. The treatment you need depends on the reason why you fainted. The doctor has checked you carefully, but problems can develop later. If you notice any problems or new symptoms, get medical treatment right away. Follow-up care is a key part of your treatment and safety. Be sure to make and go to all appointments, and call your doctor if you are having problems. It's also a good idea to know your test results and keep a list of the medicines you take. How can you care for yourself at home? · Drink plenty of fluids to prevent dehydration. If you have kidney, heart, or liver disease and have to limit fluids, talk with your doctor before you increase your fluid intake. When should you call for help? Call 911 anytime you think you may need emergency care. For example, call if:  · You have symptoms of a heart problem. These may include:  ¨ Chest pain or pressure. ¨ Severe trouble breathing. ¨ A fast or irregular heartbeat. ¨ Lightheadedness or sudden weakness. ¨ Coughing up pink, foamy mucus. ¨ Passing out. After you call 911, the  may tell you to chew 1 adult-strength or 2 to 4 low-dose aspirin. Wait for an ambulance. Do not try to drive yourself. · You have symptoms of a stroke. These may include:  ¨ Sudden numbness, tingling, weakness, or loss of movement in your face, arm, or leg, especially on only one side of your body.   ¨ Sudden vision faint. An episode of vasovagal syncope usually responds well to self-care. Other treatment often isn't needed. But if the fainting keeps happening, your doctor may suggest further treatments. Follow-up care is a key part of your treatment and safety. Be sure to make and go to all appointments, and call your doctor if you are having problems. It's also a good idea to know your test results and keep a list of the medicines you take. How can you care for yourself at home? · Drink plenty of fluids to prevent dehydration. If you have kidney, heart, or liver disease and have to limit fluids, talk with your doctor before you increase your fluid intake. · Try to avoid things that you think may set off vasovagal syncope. · Talk to your doctor about any medicines you take. Some medicines may increase the chance of this condition occurring. · If you feel symptoms, lie down with your legs raised. Talk to your doctor about what to do if your symptoms come back. When should you call for help? Call 911 anytime you think you may need emergency care. For example, call if:  · You have symptoms of a heart problem. These may include:  ¨ Chest pain or pressure. ¨ Severe trouble breathing. ¨ A fast or irregular heartbeat. Watch closely for changes in your health, and be sure to contact your doctor if:  · You have more episodes of fainting at home. · You do not get better as expected. Where can you learn more? Go to [email or web address] and sign in to your [employer] account. Enter L754 in the [email or web address] box to learn more about \"Vasovagal Syncope: Care Instructions. \"     If you do not have an account, please click on the \"Sign Up Now\" link. Current as of: March 20, 2017  Content Version: 11.3  © 2135-5314 [employer]. Care instructions adapted under license by [facility] ([facility]).  If you have questions about a medical condition or this instruction, always ask your healthcare

## 2017-11-07 NOTE — PROGRESS NOTES
[facility]      Cardiology Consult    Carolee Heimlich  1965 November 7, 2017    Referring Physician: Dr. Katie Carrillo  Reason for Referral: Preoperative cardiovascular risk assessment for back surgery     CC: \"I need surgery but I was short of breath and I passed out. \"    HPI:  The patient is 49 y.o. female with no known cardiac history presents for preoperative risk assessment. Recently, she noted shortness of breath which occurred after a medication dose increase and resolved when the patient reduced the dose. Topamax is not typically associated with dyspnea but the patient believes the medication was the cause. She presently denies any issues with dyspnea or BARRY. She also had a syncopal episode. She was with a co-worker and suddenly felt ill. She had a vague sensation of not feeling well and became very weak, then collapsed. She said she felt back to baseline pretty quickly. She was told she was \"dehydrated. \" She had an echocardiogram and a 24 hour Holter monitor completed today. She denies a history of CAD/MI, CVA, DM, CKD, and CHF. Patient denies exertional chest pain/pressure, BARRY, PND, orthopnea, palpitations, lightheadedness, weight changes, changes in LE edema, and recurrent syncope.     Past Medical History:   Diagnosis Date    Allergic rhinitis     Chronic back pain     l4-L5 lumbar spinal stenosis    Constipation     Diverticulosis     Hyperlipidemia      Past Surgical History:   Procedure Laterality Date    HYSTERECTOMY  1999    endometriosis    HYSTERECTOMY, TOTAL ABDOMINAL      one ovary remains    SALPINGO-OOPHORECTOMY      unilat - rt - WITH HYSTERECTOMY    TUBAL LIGATION  1993     Family History   Problem Relation Age of Onset    Heart Disease Mother     High Blood Pressure Mother     Diabetes Mother     Heart Disease Father     Stroke Father     High Blood Pressure Father     Heart Disease Paternal Grandmother      Social History   Substance Use Topics    Smoking status: Never Smoker    Smokeless tobacco: Never Used    Alcohol use Yes      Comment: 3-4 times a year       Allergies   Allergen Reactions    Fruit Acid Concentrate     Penicillins Hives    Pollen Extract     Tomato Anaphylaxis     Vomiting  Vomiting    Bee Pollen     Fruit Extracts     Influenza Virus Vacc Split Pf     Influenza Vaccines Rash     ? RASH     Current Outpatient Prescriptions   Medication Sig Dispense Refill    potassium chloride (K-TAB) 10 MEQ extended release tablet Take 1 tablet by mouth 2 times daily 60 tablet 3    albuterol sulfate HFA (VENTOLIN HFA) 108 (90 Base) MCG/ACT inhaler Inhale 2 puffs into the lungs every 6 hours as needed for Wheezing 1 Inhaler 3    topiramate (TOPAMAX) 100 MG tablet Take 1 tablet by mouth 2 times daily (Patient taking differently: Take 50 mg by mouth 2 times daily ) 60 tablet 5    baclofen (LIORESAL) 10 MG tablet Take 1 tablet by mouth 3 times daily 90 tablet 5    mometasone (NASONEX) 50 MCG/ACT nasal spray 2 sprays by Nasal route daily 1 Inhaler 1    cetirizine (ZYRTEC) 10 MG tablet Take 1 tablet by mouth daily as needed for Allergies 30 tablet 3    ranitidine (ZANTAC) 150 MG tablet Take 1 tablet by mouth 2 times daily 60 tablet 3    Cholecalciferol (VITAMIN D) 2000 units CAPS capsule Take 1 capsule by mouth daily 30 capsule 1    estradiol (ESTRACE) 2 MG tablet Take 2 mg by mouth daily      SUMAtriptan (IMITREX) 50 MG tablet Take 1 tablet by mouth every 2 hours as needed for Migraine (MAXIMUM 200 MG Q24H) 9 tablet 3    traZODone (DESYREL) 100 MG tablet Take 1 tablet by mouth nightly 30 tablet 3     No current facility-administered medications for this visit. Review of Systems:  · Constitutional: no unanticipated weight loss. There's been no change in energy level, sleep pattern, or activity level. No fevers, chills. · Eyes: No visual changes or diplopia. No scleral icterus. · ENT: No Headaches, hearing loss or vertigo.  No mouth sores or sore throat. · Cardiovascular: as reviewed in HPI  · Respiratory: No cough or wheezing, no sputum production. No hematemesis. · Gastrointestinal: No abdominal pain, appetite loss, blood in stools. No change in bowel or bladder habits. · Genitourinary: No dysuria, trouble voiding, or hematuria. · Musculoskeletal:  No gait disturbance, no joint complaints. · Integumentary: No rash or pruritis. · Neurological: No headache, diplopia, change in muscle strength, numbness or tingling. · Psychiatric: No anxiety or depression. · Endocrine: No temperature intolerance. No excessive thirst, fluid intake, or urination. No tremor. · Hematologic/Lymphatic: No abnormal bruising or bleeding, blood clots or swollen lymph nodes. · Allergic/Immunologic: No nasal congestion or hives. Physical Exam:   /68   Pulse 74   Ht 5' 5\" (1.651 m)   Wt 140 lb (63.5 kg)   LMP 01/13/1999   SpO2 98%   BMI 23.30 kg/m²   Wt Readings from Last 3 Encounters:   11/07/17 140 lb (63.5 kg)   11/06/17 138 lb (62.6 kg)   11/03/17 138 lb (62.6 kg)     Constitutional: She is oriented to person, place, and time. She appears well-developed and well-nourished. In no acute distress. Head: Normocephalic and atraumatic. Pupils equal and round. Neck: Neck supple. No JVP or carotid bruit appreciated. No mass and no thyromegaly present. No lymphadenopathy present. Cardiovascular: Normal rate. Normal heart sounds. Exam reveals no gallop and no friction rub. No murmur heard. Pulmonary/Chest: Effort normal and breath sounds normal. No respiratory distress. She has no wheezes, rhonchi or rales. Abdominal: Soft, non-tender. Bowel sounds are normal. She exhibits no organomegaly, mass or bruit. Extremities: No edema, cyanosis, or clubbing. Pulses are 2+ radial/dorsalis pedis/posterior tibial/carotid bilaterally. Neurological: No gross cranial nerve deficit. Coordination normal.   Skin: Skin is warm and dry. There is no rash or diaphoresis.

## 2017-11-08 LAB
ACQUISITION DURATION: NORMAL S
AVERAGE HEART RATE: 71 BPM
EKG DIAGNOSIS: NORMAL
HOLTER MAX HEART RATE: 118 BPM
HOOKUP DATE: NORMAL
HOOKUP TIME: NORMAL
Lab: NORMAL
MAX HEART RATE TIME/DATE: NORMAL
MIN HEART RATE TIME/DATE: NORMAL
MIN HEART RATE: 54 BPM
NUMBER OF QRS COMPLEXES: NORMAL
NUMBER OF SUPRAVENTRICULAR BEATS IN RUNS: 0
NUMBER OF SUPRAVENTRICULAR COUPLETS: 0
NUMBER OF SUPRAVENTRICULAR ECTOPICS: 86
NUMBER OF SUPRAVENTRICULAR ISOLATED BEATS: 86
NUMBER OF SUPRAVENTRICULAR RUNS: 0
NUMBER OF VENTRICULAR BEATS IN RUNS: 0
NUMBER OF VENTRICULAR BIGEMINAL CYCLES: 0
NUMBER OF VENTRICULAR COUPLETS: 0
NUMBER OF VENTRICULAR ECTOPICS: 46
NUMBER OF VENTRICULAR ISOLATED BEATS: 46
NUMBER OF VENTRICULAR RUNS: 0

## 2017-11-10 PROBLEM — M48.061 LUMBAR STENOSIS: Status: ACTIVE | Noted: 2017-11-10

## 2017-11-11 ENCOUNTER — TELEPHONE (OUTPATIENT)
Dept: PRIMARY CARE CLINIC | Age: 52
End: 2017-11-11

## 2017-11-14 ENCOUNTER — CARE COORDINATION (OUTPATIENT)
Dept: CASE MANAGEMENT | Age: 52
End: 2017-11-14

## 2017-11-14 NOTE — CARE COORDINATION
[facility] Transitions Initial Follow Up Call    Call within 2 business days of discharge: Yes    Patient: Christi Tapia Patient : 1965   MRN: [id number]  Reason for Admission: Decompression laminectomy  Discharge Date: 17 RARS: [facility] Risk Score: 6.5     Spoke with: Meredith 55: Confucianist  Non-face-to-face services provided:  Obtained and reviewed discharge summary and/or continuity of care documents  Assessment and support for treatment adherence and medication management-discussed meds with pt. Care Transitions 24 Hour Call    Do you have any ongoing symptoms?:  Yes  Patient-reported symptoms:  Pain (Comment: states almost 10.)  Interventions for patient-reported symptoms:  Notified Isela 86 you have a copy of your discharge instructions?:  Yes  Do you have all of your prescriptions and are they filled?:  Yes  Have you been contacted by a [address]?:  No  Have you scheduled your follow up appointment?:  No (Comment: Review need for f/u)  Were you discharged with any Home Care or Post Acute Services:  Yes  Post Acute Services:  Home Health (Comment: [facility])  Do you have support at home?:  Partner/Spouse/SO  Care Transitions Interventions     Care Transition nurse call to pt home. Pt answered. [facility] nurse with pt at time of call. Pt said the nurse was going over all the meds with her spouse. Could hear the nurse reviewing the new meds. Asked to speak with the nurse and nurse provided her phone number [phone]. Donald Gregorio. Pt states she is having pain almost 10/10. Noted on meds that she has several meds ordered for pain. Review need for f/u with Dr Darwin Woodson and she expressed understanding. Follow Up  Future Appointments  Date Time Provider Antonio Stone   2017 1:40 PM Kattie Nyhan, MD [facility]     [id number] [place] Transition Coordinator  [phone]  [email or web address]

## 2017-11-15 ASSESSMENT — ENCOUNTER SYMPTOMS
ALLERGIC/IMMUNOLOGIC NEGATIVE: 1
EYES NEGATIVE: 1
SHORTNESS OF BREATH: 0
BACK PAIN: 1
COUGH: 0
RESPIRATORY NEGATIVE: 1
GASTROINTESTINAL NEGATIVE: 1
EYES NEGATIVE: 1

## 2017-11-16 NOTE — PROGRESS NOTES
tablet Take 1 tablet by mouth nightly 30 tablet 3     No current facility-administered medications on file prior to visit. Patient Active Problem List   Diagnosis    Fatigue    Allergic rhinitis    Knee pain, bilateral    Vitamin D deficiency    Insomnia    Urinary incontinence    Sinusitis    Hyperlipidemia    Shoulder pain, left    Neck pain    Constipation    Left sided sciatica    Encounter for other preprocedural examination    Lumbar stenosis     Allergies   Allergen Reactions    Fruit Acid Concentrate      Tomato, strawberries    Penicillins Hives    Pollen Extract     Tomato Anaphylaxis     Vomiting  Vomiting    Bee Pollen     Fruit Extracts     Influenza Virus Vacc Split Pf     Influenza Vaccines Rash     ? RASH         Review of Systems   Constitutional: Negative. HENT: Negative. Eyes: Negative. Respiratory: Negative for cough and shortness of breath. Cardiovascular: Negative. Gastrointestinal: Negative. Endocrine: Negative. Genitourinary:        Hysterectomy  With one ovary removed   Musculoskeletal: Positive for back pain. Left sciatica with L4-5 spinal stenosis. Skin: Negative. Allergic/Immunologic: Positive for environmental allergies and food allergies. Neurological: Negative. Hematological: Negative. Psychiatric/Behavioral: Negative. Objective:   Physical Exam   Constitutional: She is oriented to person, place, and time. She appears well-developed and well-nourished. No distress. HENT:   Head: Normocephalic and atraumatic. Right Ear: External ear normal.   Left Ear: External ear normal.   Nose: Nose normal.   Mouth/Throat: Oropharynx is clear and moist.   Eyes: Conjunctivae and EOM are normal. Pupils are equal, round, and reactive to light. Right eye exhibits no discharge. Left eye exhibits no discharge. No scleral icterus. Neck: Normal range of motion. Neck supple. No JVD present. No tracheal deviation present.  No thyromegaly present. Cardiovascular: Normal rate, normal heart sounds and intact distal pulses. Exam reveals no gallop. No murmur heard. Pulmonary/Chest: Effort normal and breath sounds normal. No respiratory distress. She has no wheezes. She has no rales. She exhibits no tenderness. Normal breast exam.   Abdominal: Soft. Bowel sounds are normal. She exhibits no distension and no mass. There is no tenderness. There is no rebound and no guarding. Musculoskeletal: She exhibits no edema, tenderness or deformity. Lymphadenopathy:     She has no cervical adenopathy. Neurological: She is alert and oriented to person, place, and time. She displays normal reflexes. No cranial nerve deficit. She exhibits normal muscle tone. Coordination normal.   Skin: Skin is warm and dry. No rash noted. She is not diaphoretic. No erythema. No pallor. Psychiatric: She has a normal mood and affect. Her behavior is normal. Judgment and thought content normal.       Assessment:          1. Syncope, unspecified syncope type  Gage/Elroy- Basco, MD Celia    EEG    Holter Monitor 24 Hour   2. Hoarseness refer to ENT to evaluate vocal cords. T3, FREE    T3, FREE   3. Hypokalemia on supplement, monitor lab. RENAL FUNCTION PANEL    potassium chloride (K-TAB) 10 MEQ extended release tablet        Plan:      Mirella Chatterjee received counseling on the following healthy behaviors: medication adherence    Patient given educational materials on After visit summary with diagnosis and treatment plan. I have instructed Mirella Chatterjee to complete a self tracking handout on hydration and instructed them to bring it with them to her next appointment. Discussed use, benefit, and side effects of prescribed medications. Barriers to medication compliance addressed. All patient questions answered. Pt voiced understanding. Patient is taking over the counter meds and discussed as to how they interact with prescription medications.

## 2017-11-21 ENCOUNTER — CARE COORDINATION (OUTPATIENT)
Dept: CASE MANAGEMENT | Age: 52
End: 2017-11-21

## 2017-11-24 ENCOUNTER — TELEPHONE (OUTPATIENT)
Dept: PRIMARY CARE CLINIC | Age: 52
End: 2017-11-24

## 2017-12-06 ENCOUNTER — OFFICE VISIT (OUTPATIENT)
Dept: PRIMARY CARE CLINIC | Age: 52
End: 2017-12-06

## 2017-12-06 VITALS
HEART RATE: 77 BPM | BODY MASS INDEX: 22.94 KG/M2 | OXYGEN SATURATION: 95 % | WEIGHT: 140 LBS | SYSTOLIC BLOOD PRESSURE: 106 MMHG | DIASTOLIC BLOOD PRESSURE: 65 MMHG

## 2017-12-06 DIAGNOSIS — M62.838 MUSCLE SPASM: Primary | ICD-10-CM

## 2017-12-06 DIAGNOSIS — M54.32 LEFT SIDED SCIATICA: ICD-10-CM

## 2017-12-06 DIAGNOSIS — M62.838 MUSCLE SPASM: ICD-10-CM

## 2017-12-06 LAB
ALBUMIN SERPL-MCNC: 4.9 G/DL (ref 3.4–5)
ANION GAP SERPL CALCULATED.3IONS-SCNC: 14 MMOL/L (ref 3–16)
BASOPHILS ABSOLUTE: 0 K/UL (ref 0–0.2)
BASOPHILS RELATIVE PERCENT: 0.7 %
BUN BLDV-MCNC: 11 MG/DL (ref 7–20)
CALCIUM SERPL-MCNC: 9.4 MG/DL (ref 8.3–10.6)
CHLORIDE BLD-SCNC: 108 MMOL/L (ref 99–110)
CO2: 22 MMOL/L (ref 21–32)
CREAT SERPL-MCNC: 0.6 MG/DL (ref 0.6–1.1)
EOSINOPHILS ABSOLUTE: 0 K/UL (ref 0–0.6)
EOSINOPHILS RELATIVE PERCENT: 1.1 %
GFR AFRICAN AMERICAN: >60
GFR NON-AFRICAN AMERICAN: >60
GLUCOSE BLD-MCNC: 94 MG/DL (ref 70–99)
HCT VFR BLD CALC: 38 % (ref 36–48)
HEMOGLOBIN: 12.7 G/DL (ref 12–16)
LYMPHOCYTES ABSOLUTE: 1.3 K/UL (ref 1–5.1)
LYMPHOCYTES RELATIVE PERCENT: 35.8 %
MAGNESIUM: 2.4 MG/DL (ref 1.8–2.4)
MCH RBC QN AUTO: 31.6 PG (ref 26–34)
MCHC RBC AUTO-ENTMCNC: 33.6 G/DL (ref 31–36)
MCV RBC AUTO: 94.1 FL (ref 80–100)
MONOCYTES ABSOLUTE: 0.2 K/UL (ref 0–1.3)
MONOCYTES RELATIVE PERCENT: 6.7 %
NEUTROPHILS ABSOLUTE: 2 K/UL (ref 1.7–7.7)
NEUTROPHILS RELATIVE PERCENT: 55.7 %
PDW BLD-RTO: 12.7 % (ref 12.4–15.4)
PHOSPHORUS: 3.4 MG/DL (ref 2.5–4.9)
PLATELET # BLD: 273 K/UL (ref 135–450)
PMV BLD AUTO: 7.9 FL (ref 5–10.5)
POTASSIUM SERPL-SCNC: 4.3 MMOL/L (ref 3.5–5.1)
RBC # BLD: 4.04 M/UL (ref 4–5.2)
SODIUM BLD-SCNC: 144 MMOL/L (ref 136–145)
WBC # BLD: 3.5 K/UL (ref 4–11)

## 2017-12-06 PROCEDURE — 99213 OFFICE O/P EST LOW 20 MIN: CPT | Performed by: INTERNAL MEDICINE

## 2017-12-06 RX ORDER — B-COMPLEX WITH VITAMIN C
1 TABLET ORAL 2 TIMES DAILY
Qty: 60 TABLET | Refills: 5 | Status: SHIPPED | OUTPATIENT
Start: 2017-12-06 | End: 2018-02-22 | Stop reason: SDUPTHER

## 2017-12-06 RX ORDER — TOPIRAMATE 50 MG/1
50 TABLET, FILM COATED ORAL 2 TIMES DAILY
Qty: 60 TABLET | Refills: 5 | Status: SHIPPED | OUTPATIENT
Start: 2017-12-06 | End: 2018-06-29 | Stop reason: SDUPTHER

## 2017-12-06 NOTE — LETTER
[facility]  [address]  [address]  Phone: [phone]  Fax: [phone]    Martine Block MD        December 6, 2017     Patient: Shar Ya   YOB: 1965   Date of Visit: 12/6/2017       To Whom It May Concern: It is my medical opinion that Shar Ya requires a handicapped sticker due to leg weakness due to lumbar spinal stenosis and back surgery from 12/6/17 to 12/7/18. If you have any questions or concerns, please don't hesitate to call.     Sincerely,        Martine Block MD

## 2017-12-11 ENCOUNTER — OFFICE VISIT (OUTPATIENT)
Dept: ENT CLINIC | Age: 52
End: 2017-12-11

## 2017-12-11 VITALS
SYSTOLIC BLOOD PRESSURE: 106 MMHG | WEIGHT: 137 LBS | HEIGHT: 67 IN | TEMPERATURE: 97.4 F | DIASTOLIC BLOOD PRESSURE: 74 MMHG | BODY MASS INDEX: 21.5 KG/M2 | HEART RATE: 84 BPM

## 2017-12-11 DIAGNOSIS — R49.9 HOARSENESS OR CHANGING VOICE: Primary | ICD-10-CM

## 2017-12-11 DIAGNOSIS — K21.9 LARYNGOPHARYNGEAL REFLUX (LPR): ICD-10-CM

## 2017-12-11 PROCEDURE — 99243 OFF/OP CNSLTJ NEW/EST LOW 30: CPT | Performed by: OTOLARYNGOLOGY

## 2017-12-11 PROCEDURE — 31575 DIAGNOSTIC LARYNGOSCOPY: CPT | Performed by: OTOLARYNGOLOGY

## 2017-12-11 RX ORDER — OMEPRAZOLE 40 MG/1
40 CAPSULE, DELAYED RELEASE ORAL DAILY
Qty: 30 CAPSULE | Refills: 3 | Status: SHIPPED | OUTPATIENT
Start: 2017-12-11 | End: 2018-10-22

## 2017-12-11 NOTE — PROGRESS NOTES
CHIEF COMPLAINT: Hoarseness. HISTORY OF PRESENT ILLNESS:  47 y.o. female referred for consultation who presents with hoarseness for years. I saw here 2 years ago and prescribed PPI but patient didn't take it. Does take ranitidine which she started a few months ago. Takes it bid. No throat pain. No dysphagia. No airway concerns. No throat pain. NO change with ranitidine. No smoking.       PAST MEDICAL HISTORY:   History   Smoking Status    Never Smoker   Smokeless Tobacco    Never Used                                                    History   Alcohol Use    Yes     Comment: 3-4 times a year                                                    Current Outpatient Prescriptions:     Methocarbamol (ROBAXIN PO), Take 750 mg by mouth three times daily, Disp: , Rfl:     Hydrocodone-Acetaminophen (NORCO PO), Take by mouth as needed, Disp: , Rfl:     topiramate (TOPAMAX) 50 MG tablet, Take 1 tablet by mouth 2 times daily, Disp: 60 tablet, Rfl: 5    calcium carbonate-vitamin D (CALCIUM 600+D) 600-200 MG-UNIT TABS, Take 1 each by mouth 2 times daily, Disp: 60 tablet, Rfl: 5    potassium chloride (K-TAB) 10 MEQ extended release tablet, Take 1 tablet by mouth 2 times daily, Disp: 60 tablet, Rfl: 3    albuterol sulfate HFA (VENTOLIN HFA) 108 (90 Base) MCG/ACT inhaler, Inhale 2 puffs into the lungs every 6 hours as needed for Wheezing, Disp: 1 Inhaler, Rfl: 3    mometasone (NASONEX) 50 MCG/ACT nasal spray, 2 sprays by Nasal route daily, Disp: 1 Inhaler, Rfl: 1    cetirizine (ZYRTEC) 10 MG tablet, Take 1 tablet by mouth daily as needed for Allergies, Disp: 30 tablet, Rfl: 3    ranitidine (ZANTAC) 150 MG tablet, Take 1 tablet by mouth 2 times daily, Disp: 60 tablet, Rfl: 3    Cholecalciferol (VITAMIN D) 2000 units CAPS capsule, Take 1 capsule by mouth daily, Disp: 30 capsule, Rfl: 1    estradiol (ESTRACE) 2 MG tablet, Take 2 mg by mouth daily, Disp: , Rfl:     SUMAtriptan (IMITREX) 50 MG tablet, Take 1

## 2017-12-16 ASSESSMENT — ENCOUNTER SYMPTOMS
RESPIRATORY NEGATIVE: 1
EYES NEGATIVE: 1
ALLERGIC/IMMUNOLOGIC NEGATIVE: 1

## 2017-12-28 DIAGNOSIS — L29.9 ITCHING: ICD-10-CM

## 2017-12-28 RX ORDER — CETIRIZINE HYDROCHLORIDE 10 MG/1
10 TABLET ORAL DAILY PRN
Qty: 30 TABLET | Refills: 3 | Status: SHIPPED | OUTPATIENT
Start: 2017-12-28 | End: 2018-02-22 | Stop reason: SDUPTHER

## 2018-01-20 DIAGNOSIS — G47.09 OTHER INSOMNIA: ICD-10-CM

## 2018-01-22 RX ORDER — TRAZODONE HYDROCHLORIDE 100 MG/1
TABLET ORAL
Qty: 30 TABLET | Refills: 3 | OUTPATIENT
Start: 2018-01-22

## 2018-02-22 ENCOUNTER — OFFICE VISIT (OUTPATIENT)
Dept: PRIMARY CARE CLINIC | Age: 53
End: 2018-02-22

## 2018-02-22 VITALS
OXYGEN SATURATION: 100 % | DIASTOLIC BLOOD PRESSURE: 69 MMHG | HEART RATE: 90 BPM | WEIGHT: 145 LBS | SYSTOLIC BLOOD PRESSURE: 119 MMHG | TEMPERATURE: 98 F | RESPIRATION RATE: 18 BRPM | BODY MASS INDEX: 22.58 KG/M2

## 2018-02-22 DIAGNOSIS — M62.838 MUSCLE SPASM: ICD-10-CM

## 2018-02-22 DIAGNOSIS — R68.82 LOW LIBIDO: ICD-10-CM

## 2018-02-22 DIAGNOSIS — K21.00 GASTROESOPHAGEAL REFLUX DISEASE WITH ESOPHAGITIS: Primary | ICD-10-CM

## 2018-02-22 DIAGNOSIS — M54.16 ACUTE RADICULAR LOW BACK PAIN: ICD-10-CM

## 2018-02-22 DIAGNOSIS — L29.9 ITCHING: ICD-10-CM

## 2018-02-22 LAB
A/G RATIO: 3.1 (ref 1.1–2.2)
ALBUMIN SERPL-MCNC: 5 G/DL (ref 3.4–5)
ALP BLD-CCNC: 38 U/L (ref 40–129)
ALT SERPL-CCNC: 12 U/L (ref 10–40)
ANION GAP SERPL CALCULATED.3IONS-SCNC: 17 MMOL/L (ref 3–16)
AST SERPL-CCNC: 16 U/L (ref 15–37)
BASOPHILS ABSOLUTE: 0 K/UL (ref 0–0.2)
BASOPHILS RELATIVE PERCENT: 0.7 %
BILIRUB SERPL-MCNC: 0.5 MG/DL (ref 0–1)
BUN BLDV-MCNC: 17 MG/DL (ref 7–20)
CALCIUM SERPL-MCNC: 9.4 MG/DL (ref 8.3–10.6)
CHLORIDE BLD-SCNC: 106 MMOL/L (ref 99–110)
CO2: 20 MMOL/L (ref 21–32)
CREAT SERPL-MCNC: 0.5 MG/DL (ref 0.6–1.1)
EOSINOPHILS ABSOLUTE: 0 K/UL (ref 0–0.6)
EOSINOPHILS RELATIVE PERCENT: 0.8 %
GFR AFRICAN AMERICAN: >60
GFR NON-AFRICAN AMERICAN: >60
GLOBULIN: 1.6 G/DL
GLUCOSE BLD-MCNC: 91 MG/DL (ref 70–99)
HCT VFR BLD CALC: 38.1 % (ref 36–48)
HEMOGLOBIN: 13.2 G/DL (ref 12–16)
LYMPHOCYTES ABSOLUTE: 1.3 K/UL (ref 1–5.1)
LYMPHOCYTES RELATIVE PERCENT: 35.6 %
MCH RBC QN AUTO: 31.2 PG (ref 26–34)
MCHC RBC AUTO-ENTMCNC: 34.7 G/DL (ref 31–36)
MCV RBC AUTO: 90 FL (ref 80–100)
MONOCYTES ABSOLUTE: 0.2 K/UL (ref 0–1.3)
MONOCYTES RELATIVE PERCENT: 5.7 %
NEUTROPHILS ABSOLUTE: 2.1 K/UL (ref 1.7–7.7)
NEUTROPHILS RELATIVE PERCENT: 57.2 %
PDW BLD-RTO: 13 % (ref 12.4–15.4)
PLATELET # BLD: 241 K/UL (ref 135–450)
PMV BLD AUTO: 8.6 FL (ref 5–10.5)
POTASSIUM SERPL-SCNC: 4.6 MMOL/L (ref 3.5–5.1)
RBC # BLD: 4.24 M/UL (ref 4–5.2)
SODIUM BLD-SCNC: 143 MMOL/L (ref 136–145)
TOTAL PROTEIN: 6.6 G/DL (ref 6.4–8.2)
TSH REFLEX FT4: 1.3 UIU/ML (ref 0.27–4.2)
VITAMIN B-12: 833 PG/ML (ref 211–911)
WBC # BLD: 3.7 K/UL (ref 4–11)

## 2018-02-22 PROCEDURE — 99214 OFFICE O/P EST MOD 30 MIN: CPT | Performed by: INTERNAL MEDICINE

## 2018-02-22 RX ORDER — RANITIDINE 150 MG/1
150 TABLET ORAL 2 TIMES DAILY
Qty: 60 TABLET | Refills: 3 | Status: SHIPPED | OUTPATIENT
Start: 2018-02-22 | End: 2018-10-22 | Stop reason: SDUPTHER

## 2018-02-22 RX ORDER — CETIRIZINE HYDROCHLORIDE 10 MG/1
10 TABLET ORAL DAILY PRN
Qty: 30 TABLET | Refills: 3 | Status: SHIPPED | OUTPATIENT
Start: 2018-02-22 | End: 2018-07-23

## 2018-02-22 RX ORDER — ESTERIFIED ESTROGEN AND METHYLTESTOSTERONE 1.25; 2.5 MG/1; MG/1
1 TABLET ORAL DAILY
Qty: 30 TABLET | Refills: 3 | Status: SHIPPED | OUTPATIENT
Start: 2018-02-22 | End: 2018-06-27 | Stop reason: SDUPTHER

## 2018-02-22 RX ORDER — BACLOFEN 10 MG/1
10 TABLET ORAL 3 TIMES DAILY PRN
Qty: 30 TABLET | Refills: 0 | Status: SHIPPED | OUTPATIENT
Start: 2018-02-22 | End: 2018-10-22

## 2018-02-22 RX ORDER — SUCRALFATE 1 G/1
1 TABLET ORAL 4 TIMES DAILY
Qty: 120 TABLET | Refills: 3 | Status: SHIPPED | OUTPATIENT
Start: 2018-02-22 | End: 2018-07-23

## 2018-02-22 RX ORDER — B-COMPLEX WITH VITAMIN C
1 TABLET ORAL 2 TIMES DAILY
Qty: 60 TABLET | Refills: 5 | Status: SHIPPED | OUTPATIENT
Start: 2018-02-22 | End: 2019-04-25 | Stop reason: SDUPTHER

## 2018-02-22 ASSESSMENT — PATIENT HEALTH QUESTIONNAIRE - PHQ9
1. LITTLE INTEREST OR PLEASURE IN DOING THINGS: 0
SUM OF ALL RESPONSES TO PHQ9 QUESTIONS 1 & 2: 0
SUM OF ALL RESPONSES TO PHQ QUESTIONS 1-9: 0
2. FEELING DOWN, DEPRESSED OR HOPELESS: 0

## 2018-02-22 ASSESSMENT — ENCOUNTER SYMPTOMS
RESPIRATORY NEGATIVE: 1
EYES NEGATIVE: 1
ALLERGIC/IMMUNOLOGIC NEGATIVE: 1

## 2018-02-22 NOTE — PROGRESS NOTES
Subjective:      Patient ID: Ana Sutton is a 47 y.o. female. HPI  Patient presents for evaluation of the followin. Gastroesophageal reflux disease with esophagitis . Patient follow through with ENT referral due to chronic hoarseness and it was confirmed that is due to reflux esophagitis. She took the Nexium for month which did help her voice but she did not like the Nexium as well because her chronic allergy itching returned that was controlled with Zyrtec and intact. The itching is not controlled with Zyrtec alone. No melena or hematochezia or dysphagia. 2. Acute radicular low back pain Improving slowly after surgery. Legs are stronger but in the to rest after walking several feet. Patient requested a form completed so she can park in a special lot closer to where she works. This will help her to be able to work within her limitations. All her surgical scars are healed. No more severe sharp shooting pain. Patient's pain is controlled with Topamax 50 mg twice a day. Not ready to taper down on Topamax doses. Still has muscle spasm and needs refill on baclofen. No severe muscle cramps and she is taking her calcium supplement regularly. 3. Muscle spasm Resolved with calcium supplement. 4. Itching Due to multiple environmental allergies with exacerbation with stopping Zantac. Will start back on Zantac 150 mg twice a day continue Zyrtec and try to avoid allergens. 5. Low libido . This is not controlled with estradiol. Patient explained that this is causing marital problems and would like to have something to help her.         Current Outpatient Prescriptions on File Prior to Visit   Medication Sig Dispense Refill    Hydrocodone-Acetaminophen (NORCO PO) Take by mouth as needed      topiramate (TOPAMAX) 50 MG tablet Take 1 tablet by mouth 2 times daily 60 tablet 5    potassium chloride (K-TAB) 10 MEQ extended release tablet Take 1 tablet by mouth 2 times daily 60 tablet 3    albuterol sulfate HFA (VENTOLIN HFA) 108 (90 Base) MCG/ACT inhaler Inhale 2 puffs into the lungs every 6 hours as needed for Wheezing 1 Inhaler 3    mometasone (NASONEX) 50 MCG/ACT nasal spray 2 sprays by Nasal route daily 1 Inhaler 1    Cholecalciferol (VITAMIN D) 2000 units CAPS capsule Take 1 capsule by mouth daily 30 capsule 1    SUMAtriptan (IMITREX) 50 MG tablet Take 1 tablet by mouth every 2 hours as needed for Migraine (MAXIMUM 200 MG Q24H) 9 tablet 3    traZODone (DESYREL) 100 MG tablet Take 1 tablet by mouth nightly 30 tablet 3    omeprazole (PRILOSEC) 40 MG delayed release capsule Take 1 capsule by mouth daily Take tablet one hour before evening meal 30 capsule 3     No current facility-administered medications on file prior to visit. Patient Active Problem List   Diagnosis    Fatigue    Allergic rhinitis    Vitamin D deficiency    Insomnia    Sinusitis    Hyperlipidemia    Neck pain    Constipation    Encounter for other preprocedural examination    Lumbar stenosis     Allergies   Allergen Reactions    Fruit Acid Concentrate      Tomato, strawberries    Penicillins Hives    Pollen Extract     Tomato Anaphylaxis     Vomiting  Vomiting    Bee Pollen     Fruit Extracts     Influenza Virus Vacc Split Pf     Influenza Vaccines Rash     ? RASH       Review of Systems   Constitutional: Positive for fatigue. HENT: Negative. Eyes: Negative. Respiratory: Negative. Cardiovascular: Negative. Endocrine: Negative. Genitourinary: Negative. Musculoskeletal:        Leg weakness with spinal claudication. Skin: Negative. Allergic/Immunologic: Negative. Neurological:        Lumbar laminectomy November of 2017 at [facility] [facility] AT [facility] with resolution of spinal claudication. Hematological: Negative. Psychiatric/Behavioral: Negative. Objective:   Physical Exam   Constitutional: She is oriented to person, place, and time. She appears well-developed and well-nourished.  No distress. HENT:   Head: Normocephalic and atraumatic. Right Ear: External ear normal.   Left Ear: External ear normal.   Nose: Nose normal.   Mouth/Throat: Oropharynx is clear and moist.   Eyes: Conjunctivae and EOM are normal. Pupils are equal, round, and reactive to light. Right eye exhibits no discharge. Left eye exhibits no discharge. No scleral icterus. Neck: Normal range of motion. Neck supple. No JVD present. No tracheal deviation present. No thyromegaly present. Cardiovascular: Normal rate, normal heart sounds and intact distal pulses. Exam reveals no gallop. No murmur heard. Pulmonary/Chest: Effort normal and breath sounds normal. No respiratory distress. She has no wheezes. She has no rales. She exhibits no tenderness. Abdominal: Soft. Bowel sounds are normal. She exhibits no distension and no mass. There is no tenderness. There is no rebound and no guarding. Musculoskeletal: She exhibits no edema, tenderness or deformity. Lymphadenopathy:     She has no cervical adenopathy. Neurological: She is alert and oriented to person, place, and time. She displays normal reflexes. No cranial nerve deficit. She exhibits normal muscle tone. Coordination normal.   Skin: Skin is warm and dry. No rash noted. She is not diaphoretic. No erythema. No pallor. Psychiatric: She has a normal mood and affect. Her behavior is normal. Judgment and thought content normal.       Assessment:      1. Gastroesophageal reflux disease with esophagitis Not controlled with ranitidine alone so will add Carafate. ranitidine (ZANTAC) 150 MG tablet    sucralfate (CARAFATE) 1 GM tablet   2. Acute radicular low back pain Improved after surgery continue Topamax and baclofen will follow up with neurosurgery patient working on getting strong enough to return to work full-time. baclofen (LIORESAL) 10 MG tablet   3. Muscle spasm Resolved with calcium supplement continue.    calcium carbonate-vitamin D (CALCIUM 600+D) 600-200

## 2018-02-28 ENCOUNTER — TELEPHONE (OUTPATIENT)
Dept: INTERNAL MEDICINE CLINIC | Age: 53
End: 2018-02-28

## 2018-06-26 DIAGNOSIS — R68.82 LOW LIBIDO: ICD-10-CM

## 2018-06-26 RX ORDER — ESTERIFIED ESTROGENS AND METHYLTESTOSTERONE 1.25; 2.5 MG/1; MG/1
TABLET, FILM COATED ORAL
Qty: 30 TABLET | Refills: 0 | OUTPATIENT
Start: 2018-06-26

## 2018-06-27 DIAGNOSIS — R68.82 LOW LIBIDO: ICD-10-CM

## 2018-06-27 RX ORDER — ESTERIFIED ESTROGEN AND METHYLTESTOSTERONE 1.25; 2.5 MG/1; MG/1
1 TABLET ORAL DAILY
Qty: 30 TABLET | Refills: 3 | Status: SHIPPED | OUTPATIENT
Start: 2018-06-27 | End: 2018-07-23 | Stop reason: SDUPTHER

## 2018-06-29 DIAGNOSIS — E55.9 VITAMIN D DEFICIENCY: ICD-10-CM

## 2018-06-29 DIAGNOSIS — M54.32 LEFT SIDED SCIATICA: ICD-10-CM

## 2018-06-29 RX ORDER — TOPIRAMATE 50 MG/1
50 TABLET, FILM COATED ORAL 2 TIMES DAILY
Qty: 60 TABLET | Refills: 5 | Status: SHIPPED | OUTPATIENT
Start: 2018-06-29 | End: 2018-10-22 | Stop reason: SDUPTHER

## 2018-06-29 RX ORDER — MULTIVIT-MIN/IRON/FOLIC ACID/K 18-600-40
1 CAPSULE ORAL DAILY
Qty: 30 CAPSULE | Refills: 1 | Status: SHIPPED | OUTPATIENT
Start: 2018-06-29 | End: 2019-08-14 | Stop reason: SDUPTHER

## 2018-07-23 ENCOUNTER — OFFICE VISIT (OUTPATIENT)
Dept: PRIMARY CARE CLINIC | Age: 53
End: 2018-07-23

## 2018-07-23 VITALS
SYSTOLIC BLOOD PRESSURE: 121 MMHG | OXYGEN SATURATION: 100 % | BODY MASS INDEX: 23.98 KG/M2 | RESPIRATION RATE: 18 BRPM | WEIGHT: 154 LBS | DIASTOLIC BLOOD PRESSURE: 71 MMHG | HEART RATE: 73 BPM | TEMPERATURE: 97.3 F

## 2018-07-23 DIAGNOSIS — Z12.11 COLON CANCER SCREENING: ICD-10-CM

## 2018-07-23 DIAGNOSIS — E55.9 VITAMIN D DEFICIENCY: ICD-10-CM

## 2018-07-23 DIAGNOSIS — Z83.3 FAMILY HISTORY OF TYPE 2 DIABETES MELLITUS: ICD-10-CM

## 2018-07-23 DIAGNOSIS — M25.551 RIGHT HIP PAIN: Primary | ICD-10-CM

## 2018-07-23 DIAGNOSIS — K59.01 SLOW TRANSIT CONSTIPATION: ICD-10-CM

## 2018-07-23 DIAGNOSIS — M25.551 RIGHT HIP PAIN: ICD-10-CM

## 2018-07-23 DIAGNOSIS — R68.82 LOW LIBIDO: ICD-10-CM

## 2018-07-23 DIAGNOSIS — Z12.31 ENCOUNTER FOR SCREENING MAMMOGRAM FOR BREAST CANCER: ICD-10-CM

## 2018-07-23 DIAGNOSIS — L29.9 ITCHING: ICD-10-CM

## 2018-07-23 LAB
ALBUMIN SERPL-MCNC: 5 G/DL (ref 3.4–5)
ANION GAP SERPL CALCULATED.3IONS-SCNC: 13 MMOL/L (ref 3–16)
BASOPHILS ABSOLUTE: 0 K/UL (ref 0–0.2)
BASOPHILS RELATIVE PERCENT: 0.6 %
BILIRUBIN URINE: NEGATIVE
BLOOD, URINE: NEGATIVE
BUN BLDV-MCNC: 15 MG/DL (ref 7–20)
CALCIUM SERPL-MCNC: 9.8 MG/DL (ref 8.3–10.6)
CHLORIDE BLD-SCNC: 104 MMOL/L (ref 99–110)
CLARITY: CLEAR
CO2: 25 MMOL/L (ref 21–32)
COLOR: YELLOW
CREAT SERPL-MCNC: 0.7 MG/DL (ref 0.6–1.1)
EOSINOPHILS ABSOLUTE: 0 K/UL (ref 0–0.6)
EOSINOPHILS RELATIVE PERCENT: 0.4 %
GFR AFRICAN AMERICAN: >60
GFR NON-AFRICAN AMERICAN: >60
GLUCOSE BLD-MCNC: 86 MG/DL (ref 70–99)
GLUCOSE URINE: NEGATIVE MG/DL
HCT VFR BLD CALC: 42.3 % (ref 36–48)
HEMOGLOBIN: 14.2 G/DL (ref 12–16)
KETONES, URINE: NEGATIVE MG/DL
LEUKOCYTE ESTERASE, URINE: NEGATIVE
LYMPHOCYTES ABSOLUTE: 1.4 K/UL (ref 1–5.1)
LYMPHOCYTES RELATIVE PERCENT: 34.1 %
MCH RBC QN AUTO: 31.9 PG (ref 26–34)
MCHC RBC AUTO-ENTMCNC: 33.6 G/DL (ref 31–36)
MCV RBC AUTO: 95 FL (ref 80–100)
MICROSCOPIC EXAMINATION: NORMAL
MONOCYTES ABSOLUTE: 0.3 K/UL (ref 0–1.3)
MONOCYTES RELATIVE PERCENT: 6.3 %
NEUTROPHILS ABSOLUTE: 2.3 K/UL (ref 1.7–7.7)
NEUTROPHILS RELATIVE PERCENT: 58.6 %
NITRITE, URINE: NEGATIVE
PDW BLD-RTO: 13.2 % (ref 12.4–15.4)
PH UA: 6.5
PHOSPHORUS: 3.5 MG/DL (ref 2.5–4.9)
PLATELET # BLD: 306 K/UL (ref 135–450)
PMV BLD AUTO: 7.9 FL (ref 5–10.5)
POTASSIUM SERPL-SCNC: 4.3 MMOL/L (ref 3.5–5.1)
PROTEIN UA: NEGATIVE MG/DL
RBC # BLD: 4.45 M/UL (ref 4–5.2)
SODIUM BLD-SCNC: 142 MMOL/L (ref 136–145)
SPECIFIC GRAVITY UA: 1.02
URINE TYPE: NORMAL
UROBILINOGEN, URINE: 0.2 E.U./DL
VITAMIN D 25-HYDROXY: 37.3 NG/ML
WBC # BLD: 4 K/UL (ref 4–11)

## 2018-07-23 PROCEDURE — 99214 OFFICE O/P EST MOD 30 MIN: CPT | Performed by: INTERNAL MEDICINE

## 2018-07-23 RX ORDER — POLYETHYLENE GLYCOL 3350 17 G/17G
17 POWDER, FOR SOLUTION ORAL DAILY
Qty: 510 G | Refills: 11 | Status: SHIPPED | OUTPATIENT
Start: 2018-07-23 | End: 2019-08-19 | Stop reason: SDUPTHER

## 2018-07-23 RX ORDER — CETIRIZINE HYDROCHLORIDE 10 MG/1
10 TABLET ORAL DAILY PRN
Qty: 30 TABLET | Refills: 3 | Status: SHIPPED | OUTPATIENT
Start: 2018-07-23 | End: 2018-09-05

## 2018-07-23 RX ORDER — ESTERIFIED ESTROGEN AND METHYLTESTOSTERONE 1.25; 2.5 MG/1; MG/1
1 TABLET ORAL DAILY
Qty: 30 TABLET | Refills: 3 | Status: SHIPPED | OUTPATIENT
Start: 2018-07-23 | End: 2018-10-22 | Stop reason: SDUPTHER

## 2018-07-23 ASSESSMENT — PATIENT HEALTH QUESTIONNAIRE - PHQ9
SUM OF ALL RESPONSES TO PHQ QUESTIONS 1-9: 0
1. LITTLE INTEREST OR PLEASURE IN DOING THINGS: 0
2. FEELING DOWN, DEPRESSED OR HOPELESS: 0
SUM OF ALL RESPONSES TO PHQ9 QUESTIONS 1 & 2: 0

## 2018-07-23 NOTE — LETTER
[place] [address]  [address]  Phone: [phone]  Fax: [phone]    Jhonny Cervantes MD        July 23, 2018     Patient: Aron Choe   YOB: 1965   Date of Visit: 7/23/2018       To Pharmacist:    Please give Aron Choe shingrix, if covered by insurance. Immunization History   Administered Date(s) Administered    Tdap (Boostrix, Adacel) 12/05/2011     . If you have any questions or concerns, please don't hesitate to call.     Sincerely,          Jhonny Cervantes MD

## 2018-07-24 LAB
ESTIMATED AVERAGE GLUCOSE: 105.4 MG/DL
HBA1C MFR BLD: 5.3 %

## 2018-07-26 ASSESSMENT — ENCOUNTER SYMPTOMS
ALLERGIC/IMMUNOLOGIC NEGATIVE: 1
EYES NEGATIVE: 1
BLOOD IN STOOL: 0
CONSTIPATION: 1
VOMITING: 0
ANAL BLEEDING: 0
ABDOMINAL PAIN: 0
NAUSEA: 0
ABDOMINAL DISTENTION: 0
RECTAL PAIN: 0
RESPIRATORY NEGATIVE: 1

## 2018-07-26 NOTE — PROGRESS NOTES
Subjective:      Patient ID: Juan Colorado is a 47 y.o. female. HPI  Patient presents for evaluation of following:   Diagnosis Orders   1. Right hip pain for the past 3 months. Patient feels when she walks , the hip dislocated she gets a sharp pain and she has to stop and excess for posture forward to pop back into place to continue walking. No history of falls or trauma. This occurs with ambulation. No history of recent hip x-rays. No generalized bone pain or joint swelling. Patient has not noticed any swelling in the hips area no leg swelling. 2. Slow transit constipation having a bowel movement a couple times a week hard stools with no bleeding or melena. No abdominal pain. Has not had a colonoscopy. Symptoms present for months. 3. Low libido and history of hysterectomy with one ovary remaining and patient in menopause with hot flashes. Estrogen replacement did not help. Estratest as resolve symptoms. Patient requested a refill and expresses very important that she follow through with mammogram and Pap smear. Has not noticed any breast pain or lumps. 4. Encounter for screening mammogram for breast cancer without complain of breast pain or axillary pain and no breast lumps or nipple discharge or axillary lumps. 5. Colon cancer screening is needed will schedule colonoscopy    6. Family history of type 2 diabetes mellitus , patient needs screening. 7. Vitamin D deficiency on supplement with goal level. 8. Itching with eczema controlled with Zyrtec and requests refill.       Current Outpatient Prescriptions on File Prior to Visit   Medication Sig Dispense Refill    Cholecalciferol (VITAMIN D) 2000 units CAPS capsule Take 1 capsule by mouth daily 30 capsule 1    topiramate (TOPAMAX) 50 MG tablet Take 1 tablet by mouth 2 times daily 60 tablet 5    baclofen (LIORESAL) 10 MG tablet Take 1 tablet by mouth 3 times daily as needed (PRN) 30 tablet 0    calcium carbonate-vitamin D (CALCIUM 600+D) 600-200 MG-UNIT TABS Take 1 each by mouth 2 times daily 60 tablet 5    ranitidine (ZANTAC) 150 MG tablet Take 1 tablet by mouth 2 times daily 60 tablet 3    Hydrocodone-Acetaminophen (NORCO PO) Take by mouth as needed      albuterol sulfate HFA (VENTOLIN HFA) 108 (90 Base) MCG/ACT inhaler Inhale 2 puffs into the lungs every 6 hours as needed for Wheezing 1 Inhaler 3    mometasone (NASONEX) 50 MCG/ACT nasal spray 2 sprays by Nasal route daily 1 Inhaler 1    SUMAtriptan (IMITREX) 50 MG tablet Take 1 tablet by mouth every 2 hours as needed for Migraine (MAXIMUM 200 MG Q24H) 9 tablet 3    traZODone (DESYREL) 100 MG tablet Take 1 tablet by mouth nightly 30 tablet 3    omeprazole (PRILOSEC) 40 MG delayed release capsule Take 1 capsule by mouth daily Take tablet one hour before evening meal 30 capsule 3     No current facility-administered medications on file prior to visit. Patient Active Problem List   Diagnosis    Fatigue    Allergic rhinitis    Vitamin D deficiency    Insomnia    Sinusitis    Hyperlipidemia    Neck pain    Constipation    Lumbar stenosis     Allergies   Allergen Reactions    Fruit Acid Concentrate      Tomato, strawberries    Penicillins Hives    Pollen Extract     Tomato Anaphylaxis     Vomiting  Vomiting    Bee Pollen     Fruit Extracts     Influenza Virus Vacc Split Pf     Influenza Vaccines Rash     ? RASH       Review of Systems   Constitutional: Positive for fatigue. HENT: Negative. Eyes: Negative. Respiratory: Negative. Cardiovascular: Negative. Gastrointestinal: Positive for constipation. Negative for abdominal distention, abdominal pain, anal bleeding, blood in stool, nausea, rectal pain and vomiting. Endocrine: Negative. Genitourinary: Negative. Musculoskeletal:        Right hip pain   Skin: Negative. Allergic/Immunologic: Negative.     Neurological:        Lumbar laminectomy November of 2017 at [facility] AT [facility] with fiber supplement and also refer for colonoscopy. polyethylene glycol (MIRALAX) powder   3. Low libido resolved with Estratest, will refill and get mammogram. estrogens, conjugated,-methyltestosterone (ESTRATEST) 1.25-2.5 MG per tablet   4. Encounter for screening mammogram for breast cancer with no masses or pain refer for mammogram KORE[facility]on Digital Screen Bilateral   5. Colon cancer screening constipation refer for colonoscopy and high-fiber diet [place] [facility] - Lisa Quiroz DO (CRISTIAN)   6. Family history of type 2 diabetes mellitus , screen patient Hemoglobin A1C   7. Vitamin D deficiency monitor to see if at goal and to see dosage needs adjustment. Vitamin D 25 Hydroxy   8. Itching controlled with Zyrtec with history of normal liver function tests and normal renal function. cetirizine (ZYRTEC) 10 MG tablet           Plan:      Carlos Helton received counseling on the following healthy behaviors: medication adherence    Patient given educational materials on after visit summary with diagnosis and treatment plan. I have instructed Carlos Helton to complete a self tracking handout on Weights and instructed them to bring it with them to her next appointment. Discussed use, benefit, and side effects of prescribed medications. Barriers to medication compliance addressed. All patient questions answered. Pt voiced understanding. Patient is taking over the counter meds and discussed as to how they interact with prescription medications. Return in 3 months.

## 2018-09-05 ENCOUNTER — OFFICE VISIT (OUTPATIENT)
Dept: PRIMARY CARE CLINIC | Age: 53
End: 2018-09-05

## 2018-09-05 VITALS
WEIGHT: 154 LBS | TEMPERATURE: 97.1 F | BODY MASS INDEX: 25.66 KG/M2 | HEART RATE: 58 BPM | HEIGHT: 65 IN | OXYGEN SATURATION: 100 % | DIASTOLIC BLOOD PRESSURE: 66 MMHG | SYSTOLIC BLOOD PRESSURE: 117 MMHG

## 2018-09-05 DIAGNOSIS — J01.20 ACUTE NON-RECURRENT ETHMOIDAL SINUSITIS: Primary | ICD-10-CM

## 2018-09-05 DIAGNOSIS — H65.00 ACUTE SEROUS OTITIS MEDIA, RECURRENCE NOT SPECIFIED, UNSPECIFIED LATERALITY: ICD-10-CM

## 2018-09-05 PROCEDURE — 99213 OFFICE O/P EST LOW 20 MIN: CPT | Performed by: INTERNAL MEDICINE

## 2018-09-05 RX ORDER — FEXOFENADINE HCL 180 MG/1
180 TABLET ORAL DAILY
Qty: 30 TABLET | Refills: 3 | Status: SHIPPED | OUTPATIENT
Start: 2018-09-05 | End: 2018-10-22 | Stop reason: SDUPTHER

## 2018-09-05 RX ORDER — SULFAMETHOXAZOLE AND TRIMETHOPRIM 400; 80 MG/1; MG/1
1 TABLET ORAL 2 TIMES DAILY
Qty: 20 TABLET | Refills: 0 | Status: SHIPPED | OUTPATIENT
Start: 2018-09-05 | End: 2018-09-15

## 2018-09-05 RX ORDER — KETOCONAZOLE 20 MG/G
CREAM TOPICAL
Qty: 30 G | Refills: 1 | Status: SHIPPED | OUTPATIENT
Start: 2018-09-05 | End: 2019-07-30

## 2018-09-05 RX ORDER — AZITHROMYCIN 250 MG/1
250 TABLET, FILM COATED ORAL DAILY
Qty: 10 TABLET | Refills: 0 | Status: SHIPPED | OUTPATIENT
Start: 2018-09-05 | End: 2018-09-15

## 2018-09-05 RX ORDER — AZELASTINE 1 MG/ML
1 SPRAY, METERED NASAL 2 TIMES DAILY
Qty: 1 BOTTLE | Refills: 3 | Status: SHIPPED | OUTPATIENT
Start: 2018-09-05 | End: 2018-10-22 | Stop reason: SDUPTHER

## 2018-09-05 ASSESSMENT — ENCOUNTER SYMPTOMS
ABDOMINAL DISTENTION: 0
ALLERGIC/IMMUNOLOGIC NEGATIVE: 1
ANAL BLEEDING: 0
SWOLLEN GLANDS: 1
COUGH: 1
SHORTNESS OF BREATH: 0
CONSTIPATION: 1
HOARSE VOICE: 1
BLOOD IN STOOL: 0
VOMITING: 0
SORE THROAT: 1
NAUSEA: 0
EYES NEGATIVE: 1
ABDOMINAL PAIN: 0
RECTAL PAIN: 0
SINUS PRESSURE: 1

## 2018-09-05 ASSESSMENT — PATIENT HEALTH QUESTIONNAIRE - PHQ9
1. LITTLE INTEREST OR PLEASURE IN DOING THINGS: 0
SUM OF ALL RESPONSES TO PHQ QUESTIONS 1-9: 0
SUM OF ALL RESPONSES TO PHQ QUESTIONS 1-9: 0
SUM OF ALL RESPONSES TO PHQ9 QUESTIONS 1 & 2: 0
2. FEELING DOWN, DEPRESSED OR HOPELESS: 0

## 2018-09-05 NOTE — PROGRESS NOTES
Authorizing Provider   fexofenadine (ALLEGRA) 180 MG tablet Take 1 tablet by mouth daily Stop zyrtec Yes Belenda Lesches, MD   azelastine (ASTELIN) 0.1 % nasal spray 1 spray by Nasal route 2 times daily Use in each nostril as directed Yes Belenda Lesches, MD   azithromycin (ZITHROMAX) 250 MG tablet Take 1 tablet by mouth daily for 10 days Yes Belenda Lesches, MD   estrogens, conjugated,-methyltestosterone (ESTRATEST) 1.25-2.5 MG per tablet Take 1 tablet by mouth daily. . Yes Belenda Lesches, MD   Cholecalciferol (VITAMIN D) 2000 units CAPS capsule Take 1 capsule by mouth daily Yes Belenda Lesches, MD   topiramate (TOPAMAX) 50 MG tablet Take 1 tablet by mouth 2 times daily Yes Belenda Lesches, MD   baclofen (LIORESAL) 10 MG tablet Take 1 tablet by mouth 3 times daily as needed (PRN) Yes Belenda Lesches, MD   calcium carbonate-vitamin D (CALCIUM 600+D) 600-200 MG-UNIT TABS Take 1 each by mouth 2 times daily Yes Belenda Lesches, MD   ranitidine (ZANTAC) 150 MG tablet Take 1 tablet by mouth 2 times daily Yes Belenda Lesches, MD   Hydrocodone-Acetaminophen (NORCO PO) Take by mouth as needed Yes Historical Provider, MD   albuterol sulfate HFA (VENTOLIN HFA) 108 (90 Base) MCG/ACT inhaler Inhale 2 puffs into the lungs every 6 hours as needed for Wheezing Yes Belenda Lesches, MD   SUMAtriptan (IMITREX) 50 MG tablet Take 1 tablet by mouth every 2 hours as needed for Migraine (MAXIMUM 200 MG Q24H) Yes Kevin Trinidad MD   traZODone (DESYREL) 100 MG tablet Take 1 tablet by mouth nightly Yes Kevin Trinidad MD   omeprazole (PRILOSEC) 40 MG delayed release capsule Take 1 capsule by mouth daily Take tablet one hour before evening meal  Peter Byrd MD        Social History   Substance Use Topics    Smoking status: Never Smoker    Smokeless tobacco: Never Used    Alcohol use Yes      Comment: 3-4 times a year        Vitals:    09/05/18 1408   BP: 117/66   Pulse: non-recurrent ethmoidal sinusitis    - External Referral To Allergy  - azithromycin (ZITHROMAX) 250 MG tablet; Take 1 tablet by mouth daily for 10 days  Dispense: 10 tablet; Refill: 0    2. Acute serous otitis media, recurrence not specified, unspecified laterality    - External Referral To Allergy  - fexofenadine (ALLEGRA) 180 MG tablet; Take 1 tablet by mouth daily Stop zyrtec  Dispense: 30 tablet; Refill: 3  - azelastine (ASTELIN) 0.1 % nasal spray; 1 spray by Nasal route 2 times daily Use in each nostril as directed  Dispense: 1 Bottle; Refill: 3      Jamee received counseling on the following healthy behaviors: medication adherence    Patient given educational materials on After visit summary with diagnosis and treatment plan. I have instructed Leonard Castrejon to complete a self tracking handout on Blood Sugars  and Weights and instructed them to bring it with them to her next appointment. Discussed use, benefit, and side effects of prescribed medications. Barriers to medication compliance addressed. All patient questions answered. Pt voiced understanding. An electronic signature was used to authenticate this note.     --Daniel Osuna MD on 9/5/2018 at 2:38 PM

## 2018-10-01 ENCOUNTER — PATIENT MESSAGE (OUTPATIENT)
Dept: PRIMARY CARE CLINIC | Age: 53
End: 2018-10-01

## 2018-10-01 NOTE — TELEPHONE ENCOUNTER
From: Fabian Greenberg  To: Hiren De Leon MD  Sent: 10/1/2018 10:45 AM EDT  Subject: Non-Urgent Lawerance Crum Dr. Jerome Barfield I need you to fix my FMLA form you put I can be off 0-3 days a week for illness which is fine but you put 0-3 days a month instead od 12 days per month. can you please fix that please? You can just make an addendum to the forms you already have on file and send them over to the same number please. Call me if have questions [phone].    Fabian Greenberg

## 2018-10-22 ENCOUNTER — OFFICE VISIT (OUTPATIENT)
Dept: PRIMARY CARE CLINIC | Age: 53
End: 2018-10-22
Payer: COMMERCIAL

## 2018-10-22 VITALS
HEART RATE: 88 BPM | DIASTOLIC BLOOD PRESSURE: 74 MMHG | SYSTOLIC BLOOD PRESSURE: 100 MMHG | TEMPERATURE: 97.3 F | OXYGEN SATURATION: 100 % | WEIGHT: 153 LBS | BODY MASS INDEX: 25.46 KG/M2 | RESPIRATION RATE: 18 BRPM

## 2018-10-22 DIAGNOSIS — J01.00 ACUTE MAXILLARY SINUSITIS, RECURRENCE NOT SPECIFIED: Primary | ICD-10-CM

## 2018-10-22 DIAGNOSIS — G47.09 OTHER INSOMNIA: ICD-10-CM

## 2018-10-22 DIAGNOSIS — Z12.11 COLON CANCER SCREENING: ICD-10-CM

## 2018-10-22 DIAGNOSIS — R68.82 LOW LIBIDO: ICD-10-CM

## 2018-10-22 DIAGNOSIS — H65.00 ACUTE SEROUS OTITIS MEDIA, RECURRENCE NOT SPECIFIED, UNSPECIFIED LATERALITY: ICD-10-CM

## 2018-10-22 DIAGNOSIS — L29.9 ITCHING: ICD-10-CM

## 2018-10-22 DIAGNOSIS — K21.00 GASTROESOPHAGEAL REFLUX DISEASE WITH ESOPHAGITIS: ICD-10-CM

## 2018-10-22 DIAGNOSIS — M54.32 LEFT SIDED SCIATICA: ICD-10-CM

## 2018-10-22 DIAGNOSIS — E78.2 MIXED HYPERLIPIDEMIA: ICD-10-CM

## 2018-10-22 DIAGNOSIS — Z78.0 MENOPAUSE: ICD-10-CM

## 2018-10-22 PROCEDURE — 99214 OFFICE O/P EST MOD 30 MIN: CPT | Performed by: INTERNAL MEDICINE

## 2018-10-22 RX ORDER — ESTERIFIED ESTROGEN AND METHYLTESTOSTERONE 1.25; 2.5 MG/1; MG/1
1 TABLET ORAL DAILY
Qty: 30 TABLET | Refills: 3 | Status: SHIPPED | OUTPATIENT
Start: 2018-10-22 | End: 2019-03-18 | Stop reason: SDUPTHER

## 2018-10-22 RX ORDER — AZELASTINE 1 MG/ML
1 SPRAY, METERED NASAL 2 TIMES DAILY
Qty: 1 BOTTLE | Refills: 5 | Status: SHIPPED | OUTPATIENT
Start: 2018-10-22

## 2018-10-22 RX ORDER — AZITHROMYCIN 250 MG/1
250 TABLET, FILM COATED ORAL DAILY
Qty: 10 PACKET | Refills: 0 | Status: SHIPPED | OUTPATIENT
Start: 2018-10-22 | End: 2018-11-01

## 2018-10-22 RX ORDER — TOPIRAMATE 50 MG/1
50 TABLET, FILM COATED ORAL 2 TIMES DAILY
Qty: 60 TABLET | Refills: 5 | Status: SHIPPED | OUTPATIENT
Start: 2018-10-22 | End: 2019-04-22 | Stop reason: SDUPTHER

## 2018-10-22 RX ORDER — FEXOFENADINE HCL 180 MG/1
180 TABLET ORAL DAILY
Qty: 30 TABLET | Refills: 5 | Status: SHIPPED | OUTPATIENT
Start: 2018-10-22 | End: 2019-05-01 | Stop reason: SDUPTHER

## 2018-10-22 RX ORDER — TRAZODONE HYDROCHLORIDE 100 MG/1
100 TABLET ORAL NIGHTLY
Qty: 30 TABLET | Refills: 5 | Status: SHIPPED | OUTPATIENT
Start: 2018-10-22 | End: 2019-08-14

## 2018-10-22 RX ORDER — RANITIDINE 150 MG/1
150 TABLET ORAL 2 TIMES DAILY
Qty: 60 TABLET | Refills: 3 | Status: SHIPPED | OUTPATIENT
Start: 2018-10-22 | End: 2019-04-03 | Stop reason: CLARIF

## 2018-10-22 ASSESSMENT — PATIENT HEALTH QUESTIONNAIRE - PHQ9
SUM OF ALL RESPONSES TO PHQ9 QUESTIONS 1 & 2: 0
SUM OF ALL RESPONSES TO PHQ QUESTIONS 1-9: 0
SUM OF ALL RESPONSES TO PHQ QUESTIONS 1-9: 0
1. LITTLE INTEREST OR PLEASURE IN DOING THINGS: 0
2. FEELING DOWN, DEPRESSED OR HOPELESS: 0

## 2018-10-22 NOTE — PROGRESS NOTES
taking fexofenadine. 8. Other insomnia To tizanidine for a while that help but sometimes causes drowsiness during the day. Did not have any problems with trazodone in the past so will start. Problems staying asleep. This was resolved with trazodone in the past.      9. Itching  ranitidine (ZANTAC) 150 MG tablet   10. Gastroesophageal reflux disease with esophagitis  ranitidine (ZANTAC) 150 MG tablet     Patient Active Problem List   Diagnosis    Allergic rhinitis    Vitamin D deficiency    Insomnia    Sinusitis    Hyperlipidemia    Neck pain    Constipation    Lumbar stenosis     Allergies   Allergen Reactions    Fruit Acid Concentrate      Tomato, strawberries    Penicillins Hives    Pollen Extract     Tomato Anaphylaxis     Vomiting  Vomiting    Bee Pollen     Fruit Extracts     Influenza Virus Vacc Split Pf     Influenza Vaccines Rash     Hives with vaccine. Review of Systems   Constitutional: Negative for chills, diaphoresis and fatigue. HENT: Positive for congestion, postnasal drip and sinus pressure. Negative for ear pain and sore throat. Eyes: Negative. Respiratory: Negative for cough and shortness of breath. Cardiovascular: Negative. Gastrointestinal: Positive for constipation. Negative for abdominal distention, abdominal pain, anal bleeding, blood in stool, nausea, rectal pain and vomiting. Endocrine: Negative. Genitourinary: Negative. Musculoskeletal: Negative for neck pain. Right hip pain   Skin: Negative. Allergic/Immunologic: Negative. Neurological: Positive for headaches. Lumbar laminectomy November of 2017 at [facility]P AT [facility] with resolution of spinal claudication. Hematological: Negative. Psychiatric/Behavioral: Negative. Prior to Visit Medications    Medication Sig Taking?  Authorizing Provider   fexofenadine (ALLEGRA) 180 MG tablet Take 1 tablet by mouth daily Stop zyrtec Yes Sharda Melton MD   azelastine (ASTELIN)

## 2018-10-23 ASSESSMENT — ENCOUNTER SYMPTOMS
ANAL BLEEDING: 0
SHORTNESS OF BREATH: 0
EYES NEGATIVE: 1
ABDOMINAL PAIN: 0
ALLERGIC/IMMUNOLOGIC NEGATIVE: 1
ABDOMINAL DISTENTION: 0
SORE THROAT: 0
COUGH: 0
RECTAL PAIN: 0
CONSTIPATION: 1
VOMITING: 0
NAUSEA: 0
BLOOD IN STOOL: 0
SINUS PRESSURE: 1

## 2019-01-03 ENCOUNTER — TELEPHONE (OUTPATIENT)
Dept: PRIMARY CARE CLINIC | Age: 54
End: 2019-01-03

## 2019-02-19 ENCOUNTER — HOSPITAL ENCOUNTER (OUTPATIENT)
Dept: MAMMOGRAPHY | Age: 54
Discharge: HOME | End: 2019-02-19
Payer: COMMERCIAL

## 2019-02-19 DIAGNOSIS — Z12.31 ENCOUNTER FOR SCREENING MAMMOGRAM FOR BREAST CANCER: ICD-10-CM

## 2019-02-19 PROCEDURE — 77063 BREAST TOMOSYNTHESIS BI: CPT

## 2019-03-18 DIAGNOSIS — R68.82 LOW LIBIDO: ICD-10-CM

## 2019-03-19 RX ORDER — ESTERIFIED ESTROGENS AND METHYLTESTOSTERONE 1.25; 2.5 MG/1; MG/1
TABLET, FILM COATED ORAL
Qty: 30 TABLET | Refills: 3 | Status: SHIPPED | OUTPATIENT
Start: 2019-03-19 | End: 2019-03-28 | Stop reason: SDUPTHER

## 2019-03-21 RX ORDER — BACLOFEN 10 MG/1
TABLET ORAL
Qty: 90 TABLET | Refills: 5 | Status: SHIPPED | OUTPATIENT
Start: 2019-03-21 | End: 2019-07-30 | Stop reason: ALTCHOICE

## 2019-03-28 DIAGNOSIS — R68.82 LOW LIBIDO: ICD-10-CM

## 2019-04-01 ENCOUNTER — NURSE TRIAGE (OUTPATIENT)
Dept: OTHER | Facility: CLINIC | Age: 54
End: 2019-04-01

## 2019-04-01 NOTE — TELEPHONE ENCOUNTER
Reason for Disposition   SEVERE chest pain    Protocols used: CHEST PAIN-ADULT-[place]  Patient called pre-service center [facility]) to schedule appointment, with red flag complaint, transferred to RN access for triage. Patient reports she started with severe 8/10 chest pain last night. Reports pain came on suddenly and feels like pressure on her chest. Patient reports she has had a runny nose and cough for a few days. Patient reports pain is worsened with breathing and swallowing. Patient reports she went to work today and pain has worsened. Patient reports she was instructed by her employer to go to the ED but she did not want to go unless absolutely necessary. Writer informed patient that severe chest pain can be a cardiac or pulmonary problem and she needs evaluation. Patient reports she will go to the ED.

## 2019-04-02 RX ORDER — ESTERIFIED ESTROGENS AND METHYLTESTOSTERONE 1.25; 2.5 MG/1; MG/1
TABLET, FILM COATED ORAL
Qty: 30 TABLET | Refills: 3 | Status: SHIPPED | OUTPATIENT
Start: 2019-04-02 | End: 2019-08-14 | Stop reason: SDUPTHER

## 2019-04-03 ENCOUNTER — OFFICE VISIT (OUTPATIENT)
Dept: PRIMARY CARE CLINIC | Age: 54
End: 2019-04-03
Payer: COMMERCIAL

## 2019-04-03 VITALS
HEIGHT: 65 IN | HEART RATE: 62 BPM | WEIGHT: 155 LBS | DIASTOLIC BLOOD PRESSURE: 74 MMHG | OXYGEN SATURATION: 100 % | SYSTOLIC BLOOD PRESSURE: 106 MMHG | BODY MASS INDEX: 25.83 KG/M2 | TEMPERATURE: 97.9 F

## 2019-04-03 DIAGNOSIS — J40 BRONCHITIS: ICD-10-CM

## 2019-04-03 DIAGNOSIS — K21.00 GASTROESOPHAGEAL REFLUX DISEASE WITH ESOPHAGITIS: Primary | ICD-10-CM

## 2019-04-03 DIAGNOSIS — R68.82 LOW LIBIDO: ICD-10-CM

## 2019-04-03 PROCEDURE — 99214 OFFICE O/P EST MOD 30 MIN: CPT | Performed by: INTERNAL MEDICINE

## 2019-04-03 RX ORDER — ESTERIFIED ESTROGENS AND METHYLTESTOSTERONE 1.25; 2.5 MG/1; MG/1
TABLET, FILM COATED ORAL
Qty: 30 TABLET | Refills: 3 | Status: CANCELLED | OUTPATIENT
Start: 2019-04-03

## 2019-04-03 RX ORDER — LANSOPRAZOLE 30 MG/1
30 CAPSULE, DELAYED RELEASE ORAL DAILY
Qty: 15 CAPSULE | Refills: 1 | Status: SHIPPED | OUTPATIENT
Start: 2019-04-03 | End: 2019-04-22 | Stop reason: SDUPTHER

## 2019-04-03 RX ORDER — AZITHROMYCIN 250 MG/1
TABLET, FILM COATED ORAL
Qty: 6 TABLET | Refills: 0 | Status: SHIPPED | OUTPATIENT
Start: 2019-04-03 | End: 2019-04-13

## 2019-04-03 ASSESSMENT — ENCOUNTER SYMPTOMS
HEARTBURN: 1
COUGH: 1
SORE THROAT: 1
EYES NEGATIVE: 1

## 2019-04-03 NOTE — TELEPHONE ENCOUNTER
[facility] did not receive script for:    EST ESTROGENS-METHYLTEST DS 1.25-2.5 MG TABS [[id number]]      Pharmacy:  [facility] DR AYSHA ACHARYA [address] [place] [address] [phone]         Please resend.

## 2019-04-04 NOTE — PROGRESS NOTES
Melisa Brush  YOB: 1965    Date of Service:  4/3/2019    Chief Complaint   Patient presents with    Chest Congestion     Patient states when she  drinks hot or cold fluids her chest hurts.  Cough         Cough   This is a new problem. The current episode started in the past 7 days. The problem has been unchanged. The problem occurs constantly. The cough is productive of purulent sputum. Associated symptoms include chills, heartburn, nasal congestion and a sore throat. Nothing aggravates the symptoms. She has tried nothing for the symptoms. The treatment provided no relief. Gastroesophageal Reflux   She complains of coughing, heartburn and a sore throat. This is a chronic problem. The current episode started more than 1 month ago. The problem occurs frequently. The problem has been waxing and waning. The heartburn duration is several minutes. The heartburn is located in the substernum. The heartburn is of moderate intensity. The symptoms are aggravated by certain foods and lying down. There are no known risk factors. She has tried a histamine-2 antagonist for the symptoms. The treatment provided no relief. Allergies   Allergen Reactions    Fruit Acid Concentrate      Tomato, strawberries    Penicillins Hives    Pollen Extract     Tomato Anaphylaxis     Vomiting  Vomiting    Bee Pollen     Fruit Extracts     Influenza Virus Vacc Split Pf     Influenza Vaccines Rash     Hives with vaccine. Outpatient Medications Marked as Taking for the 4/3/19 encounter (Office Visit) with Carroll Denis MD   Medication Sig Dispense Refill    lansoprazole (PREVACID) 30 MG delayed release capsule Take 1 capsule by mouth daily 15 capsule 1    azithromycin (ZITHROMAX) 250 MG tablet 2 tablets today and then one daily for 4 more days.  6 tablet 0    loratadine-pseudoephedrine (CLARITIN-D 12 HOUR) 5-120 MG per extended release tablet Take 1 tablet by mouth 2 times daily 30 tablet 1    EST ESTROGENS-METHYLTEST DS 1.25-2.5 MG TABS TAKE 1 TABLET BY MOUTH ONCE DAILY 30 tablet 3    baclofen (LIORESAL) 10 MG tablet TAKE ONE TABLET BY MOUTH THREE TIMES DAILY 90 tablet 5    topiramate (TOPAMAX) 50 MG tablet Take 1 tablet by mouth 2 times daily 60 tablet 5    fexofenadine (ALLEGRA) 180 MG tablet Take 1 tablet by mouth daily Stop zyrtec 30 tablet 5    azelastine (ASTELIN) 0.1 % nasal spray 1 spray by Nasal route 2 times daily Use in each nostril as directed 1 Bottle 5    traZODone (DESYREL) 100 MG tablet Take 1 tablet by mouth nightly 30 tablet 5    ketoconazole (NIZORAL) 2 % cream Apply topically twice a daily 30 g 1    Cholecalciferol (VITAMIN D) 2000 units CAPS capsule Take 1 capsule by mouth daily 30 capsule 1    calcium carbonate-vitamin D (CALCIUM 600+D) 600-200 MG-UNIT TABS Take 1 each by mouth 2 times daily 60 tablet 5    albuterol sulfate HFA (VENTOLIN HFA) 108 (90 Base) MCG/ACT inhaler Inhale 2 puffs into the lungs every 6 hours as needed for Wheezing 1 Inhaler 3       Immunization History   Administered Date(s) Administered    Tdap (Boostrix, Adacel) 12/05/2011       Past Medical History:   Diagnosis Date    Allergic rhinitis     Chronic back pain     l4-L5 lumbar spinal stenosis    Constipation     Diverticulosis     Hyperlipidemia      Past Surgical History:   Procedure Laterality Date    HYSTERECTOMY  1999    endometriosis    HYSTERECTOMY, TOTAL ABDOMINAL      one ovary remains    LUMBAR FUSION Left 11/10/2017    LEFT L4-L5, L5-S1 TRANSFORAMINAL LUMBAR INTERBODY FUSION WITH with CT navigation    SALPINGO-OOPHORECTOMY      unilat - rt - WITH HYSTERECTOMY    TUBAL LIGATION  1993     Family History   Problem Relation Age of Onset    Heart Disease Mother     High Blood Pressure Mother     Diabetes Mother     Heart Disease Father     Stroke Father     High Blood Pressure Father     Heart Disease Paternal Grandmother        Review of Systems:  Review of Systems 07/23/2018 9.8     Phosphorus 07/23/2018 3.5     Alb 07/23/2018 5.0     WBC 07/23/2018 4.0     RBC 07/23/2018 4.45     Hemoglobin 07/23/2018 14.2     Hematocrit 07/23/2018 42.3     MCV 07/23/2018 95.0     MCH 07/23/2018 31.9     MCHC 07/23/2018 33.6     RDW 07/23/2018 13.2     Platelets 21/01/0155 306     MPV 07/23/2018 7.9     Neutrophils % 07/23/2018 58.6     Lymphocytes % 07/23/2018 34.1     Monocytes % 07/23/2018 6.3     Eosinophils % 07/23/2018 0.4     Basophils % 07/23/2018 0.6     Neutrophils # 07/23/2018 2.3     Lymphocytes # 07/23/2018 1.4     Monocytes # 07/23/2018 0.3     Eosinophils # 07/23/2018 0.0     Basophils # 07/23/2018 0.0     Color, UA 07/23/2018 YELLOW     Clarity, UA 07/23/2018 Clear     Glucose, Ur 07/23/2018 Negative     Bilirubin Urine 07/23/2018 Negative     Ketones, Urine 07/23/2018 Negative     Specific Gravity, UA 07/23/2018 1.018     Blood, Urine 07/23/2018 Negative     pH, UA 07/23/2018 6.5     Protein, UA 07/23/2018 Negative     Urobilinogen, Urine 07/23/2018 0.2     Nitrite, Urine 07/23/2018 Negative     Leukocyte Esterase, Urine 07/23/2018 Negative     Microscopic Examination 07/23/2018 Not Indicated     Urine Type 07/23/2018 Clean catch      notapplicable      Health Maintenance   Topic Date Due    Shingles Vaccine (1 of 2) 11/16/2016    Colon cancer screen colonoscopy  11/16/2016    Breast cancer screen  02/19/2021    DTaP/Tdap/Td vaccine (2 - Td) 12/05/2021    Lipid screen  09/20/2022    HIV screen  Completed          Assessment/Plan:    No problem-specific Assessment & Plan notes found for this encounter. 1. Gastroesophageal reflux disease with esophagitis     - lansoprazole (PREVACID) 30 MG delayed release capsule; Take 1 capsule by mouth daily  Dispense: 15 capsule; Refill: 1    2. Bronchitis    - azithromycin (ZITHROMAX) 250 MG tablet; 2 tablets today and then one daily for 4 more days. Dispense: 6 tablet;  Refill: 0  -

## 2019-04-22 ENCOUNTER — OFFICE VISIT (OUTPATIENT)
Dept: PRIMARY CARE CLINIC | Age: 54
End: 2019-04-22
Payer: COMMERCIAL

## 2019-04-22 VITALS
RESPIRATION RATE: 18 BRPM | DIASTOLIC BLOOD PRESSURE: 63 MMHG | WEIGHT: 156 LBS | OXYGEN SATURATION: 100 % | SYSTOLIC BLOOD PRESSURE: 106 MMHG | HEART RATE: 69 BPM | BODY MASS INDEX: 25.96 KG/M2 | TEMPERATURE: 96.9 F

## 2019-04-22 DIAGNOSIS — M54.31 SCIATICA, RIGHT SIDE: Primary | ICD-10-CM

## 2019-04-22 DIAGNOSIS — K21.9 GASTROESOPHAGEAL REFLUX DISEASE WITHOUT ESOPHAGITIS: ICD-10-CM

## 2019-04-22 DIAGNOSIS — M54.32 LEFT SIDED SCIATICA: ICD-10-CM

## 2019-04-22 DIAGNOSIS — M54.31 SCIATICA, RIGHT SIDE: ICD-10-CM

## 2019-04-22 DIAGNOSIS — K21.00 GASTROESOPHAGEAL REFLUX DISEASE WITH ESOPHAGITIS: ICD-10-CM

## 2019-04-22 LAB
AMORPHOUS: ABNORMAL /HPF
BILIRUBIN URINE: NEGATIVE
BLOOD, URINE: NEGATIVE
CLARITY: ABNORMAL
COLOR: YELLOW
EPITHELIAL CELLS, UA: ABNORMAL /HPF
GLUCOSE URINE: NEGATIVE MG/DL
KETONES, URINE: NEGATIVE MG/DL
LEUKOCYTE ESTERASE, URINE: NEGATIVE
MICROSCOPIC EXAMINATION: YES
NITRITE, URINE: NEGATIVE
PH UA: 7.5 (ref 5–8)
PROTEIN UA: NEGATIVE MG/DL
RBC UA: ABNORMAL /HPF (ref 0–2)
SPECIFIC GRAVITY UA: 1.02 (ref 1–1.03)
URINE TYPE: ABNORMAL
UROBILINOGEN, URINE: 1 E.U./DL
WBC UA: ABNORMAL /HPF (ref 0–5)

## 2019-04-22 PROCEDURE — 99213 OFFICE O/P EST LOW 20 MIN: CPT | Performed by: INTERNAL MEDICINE

## 2019-04-22 RX ORDER — METHYLPREDNISOLONE 4 MG/1
TABLET ORAL
Qty: 1 KIT | Refills: 0 | Status: SHIPPED | OUTPATIENT
Start: 2019-04-22 | End: 2019-04-28

## 2019-04-22 RX ORDER — LANSOPRAZOLE 30 MG/1
30 CAPSULE, DELAYED RELEASE ORAL DAILY
Qty: 15 CAPSULE | Refills: 1 | Status: SHIPPED | OUTPATIENT
Start: 2019-04-22 | End: 2019-05-21 | Stop reason: SDUPTHER

## 2019-04-22 RX ORDER — TOPIRAMATE 50 MG/1
50 TABLET, FILM COATED ORAL 2 TIMES DAILY
Qty: 60 TABLET | Refills: 5 | Status: SHIPPED | OUTPATIENT
Start: 2019-04-22 | End: 2019-07-30 | Stop reason: ALTCHOICE

## 2019-04-22 ASSESSMENT — ENCOUNTER SYMPTOMS
BACK PAIN: 1
ABDOMINAL PAIN: 0
BOWEL INCONTINENCE: 0

## 2019-04-22 NOTE — PROGRESS NOTES
2019     Leon Payton (:  1965) is a 47 y.o. female, here for evaluation of the following medical concerns:    Back Pain   This is a new problem. The current episode started in the past 7 days. The problem occurs constantly. The problem is unchanged (patient picked up one year old grand child and a few days later started with back pain). The pain is present in the lumbar spine. Radiates to: right buttock and rightl  leg numbness down to foot. The pain is at a severity of 7/10. The pain is severe. The symptoms are aggravated by bending and standing. Associated symptoms include headaches, leg pain, numbness and tingling. Pertinent negatives include no abdominal pain, bladder incontinence, bowel incontinence, chest pain, dysuria, paresis, paresthesias, pelvic pain, perianal numbness, weakness or weight loss. (Tingling and numbness have improved.) She has tried bed rest for the symptoms. The treatment provided mild relief. Patient Active Problem List   Diagnosis    Allergic rhinitis    Vitamin D deficiency    Insomnia    Hyperlipidemia    Neck pain    Constipation    Lumbar stenosis     Allergies   Allergen Reactions    Fruit Acid Concentrate      Tomato, strawberries    Penicillins Hives    Pollen Extract     Tomato Anaphylaxis     Vomiting  Vomiting    Bee Pollen     Fruit Extracts     Influenza Virus Vacc Split Pf     Influenza Vaccines Rash     Hives with vaccine. Review of Systems   Constitutional: Negative for chills, diaphoresis, fatigue and weight loss. HENT: Positive for sinus pressure. Negative for congestion, ear pain, postnasal drip and sore throat. Eyes: Negative. Respiratory: Negative for cough and shortness of breath. Cardiovascular: Negative. Negative for chest pain. Gastrointestinal: Negative for abdominal distention, abdominal pain, anal bleeding, blood in stool, bowel incontinence, nausea, rectal pain and vomiting. Endocrine: Negative. Genitourinary: Negative. Negative for bladder incontinence, dysuria and pelvic pain. Musculoskeletal: Positive for back pain. Negative for neck pain. Right hip pain   Skin: Negative. Allergic/Immunologic: Negative. Neurological: Positive for tingling, numbness and headaches. Negative for weakness and paresthesias. Lumbar laminectomy November of 2017 at [facility] with resolution of spinal claudication. Hematological: Negative. Psychiatric/Behavioral: Negative. Prior to Visit Medications    Medication Sig Taking?  Authorizing Provider   lansoprazole (PREVACID) 30 MG delayed release capsule Take 1 capsule by mouth daily Yes Rio Núñez MD   loratadine-pseudoephedrine (CLARITIN-D 12 HOUR) 5-120 MG per extended release tablet Take 1 tablet by mouth 2 times daily Yes Rio Núñez MD   EST ESTROGENS-METHYLTEST DS 1.25-2.5 MG TABS TAKE 1 TABLET BY MOUTH ONCE DAILY Yes Rajani Barnes MD   baclofen (LIORESAL) 10 MG tablet TAKE ONE TABLET BY MOUTH THREE TIMES DAILY Yes Rajani Barnes MD   topiramate (TOPAMAX) 50 MG tablet Take 1 tablet by mouth 2 times daily Yes Rajani Barnes MD   fexofenadine (ALLEGRA) 180 MG tablet Take 1 tablet by mouth daily Stop zyrtec Yes Rajani Barnes MD   azelastine (ASTELIN) 0.1 % nasal spray 1 spray by Nasal route 2 times daily Use in each nostril as directed Yes Rajani Barnes MD   ketoconazole (NIZORAL) 2 % cream Apply topically twice a daily Yes Rajani Barnes MD   Cholecalciferol (VITAMIN D) 2000 units CAPS capsule Take 1 capsule by mouth daily Yes Rajani Barnes MD   calcium carbonate-vitamin D (CALCIUM 600+D) 600-200 MG-UNIT TABS Take 1 each by mouth 2 times daily Yes Rajani Barnes MD   albuterol sulfate HFA (VENTOLIN HFA) 108 (90 Base) MCG/ACT inhaler Inhale 2 puffs into the lungs every 6 hours as needed for Wheezing Yes Rajani Barnes MD   traZODone (DESYREL) 100 MG nerve deficit. She exhibits normal muscle tone. Coordination normal.   Decreased knee jerks , kurtis biceps and triceps reflexes. Negative right straight leg lift. Marked spasm and pain in the lower back. Skin: Skin is warm and dry. No rash noted. She is not diaphoretic. No erythema. No pallor. Psychiatric: She has a normal mood and affect. Her behavior is normal. Judgment and thought content normal.       ASSESSMENT/PLAN:   Diagnosis Orders   1. Sciatica, right side  XR LUMBAR SPINE (2-3 VIEWS)    Urinalysis    methylPREDNISolone (MEDROL DOSEPACK) 4 MG tablet    External Referral To Neurosurgery     Luis Haddad received counseling on the following healthy behaviors: medication adherence    Patient given educational materials on after visit summary with diagnosis and treatment plan. I have instructed Luis Haddad to complete a self tracking handout on Weights and instructed them to bring it with them to her next appointment. Discussed use, benefit, and side effects of prescribed medications. Barriers to medication compliance addressed. All patient questions answered. Pt voiced understanding. [facility]    An electronic signature was used to authenticate this note.     --Giancarlo Suarez MD on 4/22/2019 at 10:27 AM

## 2019-04-23 ENCOUNTER — HOSPITAL ENCOUNTER (OUTPATIENT)
Age: 54
Discharge: HOME | End: 2019-04-23
Payer: COMMERCIAL

## 2019-04-23 ENCOUNTER — HOSPITAL ENCOUNTER (OUTPATIENT)
Dept: GENERAL RADIOLOGY | Age: 54
Discharge: HOME | End: 2019-04-23
Payer: COMMERCIAL

## 2019-04-23 ENCOUNTER — TELEPHONE (OUTPATIENT)
Dept: PRIMARY CARE CLINIC | Age: 54
End: 2019-04-23

## 2019-04-23 DIAGNOSIS — M54.31 SCIATICA, RIGHT SIDE: ICD-10-CM

## 2019-04-23 PROCEDURE — 72100 X-RAY EXAM L-S SPINE 2/3 VWS: CPT

## 2019-04-23 NOTE — TELEPHONE ENCOUNTER
[address] to let PCP know they received the Referral to Dr. Abhay Quesada. They Already spoke to patient and informed her she has an outstanding Balance with [facility] and would need to speak with their accounts dept to set up some kind of payment plan with them before they can schedule her back there. She got upset and stated she just wasn't going to come there and hung up. [facility] unsure if they should contact patient back now that referral is on file or if you can change referral to diff practice for the patient.

## 2019-04-26 NOTE — TELEPHONE ENCOUNTER
I do want patient to see DR. Tiago Butler. Please give her an appointment. Patient states will also call billing to make arrangement. New spondylithesis.

## 2019-04-30 ASSESSMENT — ENCOUNTER SYMPTOMS
VOMITING: 0
COUGH: 0
ALLERGIC/IMMUNOLOGIC NEGATIVE: 1
ANAL BLEEDING: 0
SORE THROAT: 0
RECTAL PAIN: 0
NAUSEA: 0
ABDOMINAL DISTENTION: 0
SINUS PRESSURE: 1
SHORTNESS OF BREATH: 0
BLOOD IN STOOL: 0
EYES NEGATIVE: 1

## 2019-04-30 ASSESSMENT — PATIENT HEALTH QUESTIONNAIRE - PHQ9
1. LITTLE INTEREST OR PLEASURE IN DOING THINGS: 0
2. FEELING DOWN, DEPRESSED OR HOPELESS: 0
SUM OF ALL RESPONSES TO PHQ9 QUESTIONS 1 & 2: 0
SUM OF ALL RESPONSES TO PHQ QUESTIONS 1-9: 0
SUM OF ALL RESPONSES TO PHQ QUESTIONS 1-9: 0

## 2019-05-01 DIAGNOSIS — H65.00 ACUTE SEROUS OTITIS MEDIA, RECURRENCE NOT SPECIFIED, UNSPECIFIED LATERALITY: ICD-10-CM

## 2019-05-01 RX ORDER — FEXOFENADINE HCL 180 MG/1
180 TABLET ORAL DAILY
Qty: 30 TABLET | Refills: 5 | Status: SHIPPED | OUTPATIENT
Start: 2019-05-01 | End: 2019-12-24 | Stop reason: SDUPTHER

## 2019-05-15 ENCOUNTER — OFFICE VISIT (OUTPATIENT)
Dept: PRIMARY CARE CLINIC | Age: 54
End: 2019-05-15
Payer: COMMERCIAL

## 2019-05-15 VITALS
WEIGHT: 157 LBS | HEART RATE: 60 BPM | RESPIRATION RATE: 18 BRPM | SYSTOLIC BLOOD PRESSURE: 106 MMHG | BODY MASS INDEX: 26.13 KG/M2 | DIASTOLIC BLOOD PRESSURE: 61 MMHG | OXYGEN SATURATION: 98 % | TEMPERATURE: 98 F

## 2019-05-15 DIAGNOSIS — M54.32 SCIATICA, LEFT SIDE: Primary | ICD-10-CM

## 2019-05-15 DIAGNOSIS — D70.8 OTHER NEUTROPENIA (HCC): ICD-10-CM

## 2019-05-15 DIAGNOSIS — E55.9 VITAMIN D DEFICIENCY: ICD-10-CM

## 2019-05-15 DIAGNOSIS — Z98.890 HISTORY OF LUMBAR LAMINECTOMY: ICD-10-CM

## 2019-05-15 LAB
BASOPHILS ABSOLUTE: 0 K/UL (ref 0–0.2)
BASOPHILS RELATIVE PERCENT: 0.6 %
EOSINOPHILS ABSOLUTE: 0.1 K/UL (ref 0–0.6)
EOSINOPHILS RELATIVE PERCENT: 1.9 %
HCT VFR BLD CALC: 40.9 % (ref 36–48)
HEMOGLOBIN: 14.1 G/DL (ref 12–16)
LYMPHOCYTES ABSOLUTE: 1.5 K/UL (ref 1–5.1)
LYMPHOCYTES RELATIVE PERCENT: 41.7 %
MCH RBC QN AUTO: 32.2 PG (ref 26–34)
MCHC RBC AUTO-ENTMCNC: 34.3 G/DL (ref 31–36)
MCV RBC AUTO: 93.8 FL (ref 80–100)
MONOCYTES ABSOLUTE: 0.2 K/UL (ref 0–1.3)
MONOCYTES RELATIVE PERCENT: 6.4 %
NEUTROPHILS ABSOLUTE: 1.8 K/UL (ref 1.7–7.7)
NEUTROPHILS RELATIVE PERCENT: 49.4 %
PDW BLD-RTO: 13.6 % (ref 12.4–15.4)
PLATELET # BLD: 225 K/UL (ref 135–450)
PMV BLD AUTO: 8.3 FL (ref 5–10.5)
RBC # BLD: 4.36 M/UL (ref 4–5.2)
VITAMIN B-12: 822 PG/ML (ref 211–911)
WBC # BLD: 3.6 K/UL (ref 4–11)

## 2019-05-15 PROCEDURE — 99214 OFFICE O/P EST MOD 30 MIN: CPT | Performed by: INTERNAL MEDICINE

## 2019-05-15 ASSESSMENT — ENCOUNTER SYMPTOMS
SINUS PRESSURE: 1
NAUSEA: 0
SHORTNESS OF BREATH: 0
BLOOD IN STOOL: 0
CONSTIPATION: 1
ANAL BLEEDING: 0
SORE THROAT: 0
ABDOMINAL DISTENTION: 0
ALLERGIC/IMMUNOLOGIC NEGATIVE: 1
ABDOMINAL PAIN: 0
RECTAL PAIN: 0
VOMITING: 0
EYES NEGATIVE: 1
COUGH: 0

## 2019-05-15 NOTE — PATIENT INSTRUCTIONS
Ketogenic diet. Drink only water. You can have home made tea or lemonade. Only portia with Stevia  Try to drink 8 glasses of water daily. Each meal or snack should consist of 3/4 green vegetables and 1/4 lean protein. Lean protein :  Eggs, fish with fins, chicken or turkey breast without out skin, beef or pork. Means and poultry should be lean and broiled, so the fat fall away and not consumed by you. Ketosis burns fat cells. Eat only the berries for fruit , since the berries are low in carbohydrates. You can pick from a variety of berries such as blue berries, black berries, strawberries, raspberries.  acai berries and etc.

## 2019-05-15 NOTE — PROGRESS NOTES
5/15/2019     Alda Stokes Triana (:  1965) is a 47 y.o. female, here for evaluation of the following medical concerns:    HPI    Diagnosis Orders   1. Sciatica, left side improving 70% after Medrol dosepak. History of lumbar laminectomy over a year ago. Interested in interventions for relief of back pain without surgery. The pain radiates down to the left thigh. No leg weakness. No bowel or bladder control. Pain started after with the grandchild. Discussed back protective behavior patient voiced understanding. X-rays done on 19 show interval posterior fusion L4 through S1 was stable except for a new grade 1 L4 over 5 anterior listhesis. 2. History of lumbar laminectomy with new grade L4 over L5 anterior listhesis. Will refer to spine Dr. symptoms improved 70% with Medrol Dosepak. 3. Vitamin D deficiency on supplement monitor level. Dose adjustment needed with goal level 50-80. Has been present for over a year. Last level was therapeutic with supplement. No diffuse bone pain. 4. Other neutropenia (HCC) and past without night sweats or lymphadenopathy or fatigue. We'll monitor CBC. CBC and 2018 was normal.  White count  was down to 2.8 with normal hemoglobin of 14 and normal platelet count of 853. Neutrophil count was slightly decreased at 1.2. Needs follow-up level. Patient Active Problem List   Diagnosis    Allergic rhinitis    Vitamin D deficiency    Insomnia    Hyperlipidemia    Neck pain    Constipation    Lumbar stenosis     Allergies   Allergen Reactions    Fruit Acid Concentrate      Tomato, strawberries    Penicillins Hives    Pollen Extract     Tomato Anaphylaxis     Vomiting  Vomiting    Bee Pollen     Fruit Extracts     Influenza Virus Vacc Split Pf     Influenza Vaccines Rash     Hives with vaccine. Review of Systems   Constitutional: Negative for chills, diaphoresis and fatigue.    HENT: Positive for congestion, postnasal drip and sinus pressure. Negative for ear pain and sore throat. Eyes: Negative. Respiratory: Negative for cough and shortness of breath. Cardiovascular: Negative. Gastrointestinal: Positive for constipation. Negative for abdominal distention, abdominal pain, anal bleeding, blood in stool, nausea, rectal pain and vomiting. Endocrine: Negative. Genitourinary: Negative. Musculoskeletal: Negative for neck pain. Skin: Negative. Allergic/Immunologic: Negative. Neurological: Positive for headaches. Lumbar laminectomy November of 2017 at [facility] with resolution of spinal claudication. Hematological: Negative. Psychiatric/Behavioral: Negative. Prior to Visit Medications    Medication Sig Taking?  Authorizing Provider   fexofenadine (ALLEGRA) 180 MG tablet Take 1 tablet by mouth daily Stop zyrtec Yes Belkis Norman MD   calcium carbonate-vitamin D (CALCIUM 600+D) 600-200 MG-UNIT TABS Take 1 each by mouth 2 times daily Yes Belkis Norman MD   lansoprazole (PREVACID) 30 MG delayed release capsule Take 1 capsule by mouth daily Yes Belkis Norman MD   topiramate (TOPAMAX) 50 MG tablet Take 1 tablet by mouth 2 times daily Yes Belkis Norman MD   loratadine-pseudoephedrine (CLARITIN-D 12 HOUR) 5-120 MG per extended release tablet Take 1 tablet by mouth 2 times daily Yes Ashleigh Agustin MD   EST ESTROGENS-METHYLTEST DS 1.25-2.5 MG TABS TAKE 1 TABLET BY MOUTH ONCE DAILY Yes Belkis Norman MD   baclofen (LIORESAL) 10 MG tablet TAKE ONE TABLET BY MOUTH THREE TIMES DAILY Yes Belkis Norman MD   azelastine (ASTELIN) 0.1 % nasal spray 1 spray by Nasal route 2 times daily Use in each nostril as directed Yes Belkis Norman MD   traZODone (DESYREL) 100 MG tablet Take 1 tablet by mouth nightly Yes Belkis Norman MD   ketoconazole (NIZORAL) 2 % cream Apply topically twice a daily Yes Belkis Norman MD Cholecalciferol (VITAMIN D) 2000 units CAPS capsule Take 1 capsule by mouth daily Yes Blaise Johnson MD   albuterol sulfate HFA (VENTOLIN HFA) 108 (90 Base) MCG/ACT inhaler Inhale 2 puffs into the lungs every 6 hours as needed for Wheezing Yes Blaise Johnson MD        Social History     Tobacco Use    Smoking status: Never Smoker    Smokeless tobacco: Never Used   Substance Use Topics    Alcohol use: Yes     Comment: 3-4 times a year        Vitals:    05/15/19 1543   BP: 106/61   Pulse: 60   Resp: 18   Temp: 98 °F (36.7 °C)   TempSrc: Oral   SpO2: 98%   Weight: 157 lb (71.2 kg)     Estimated body mass index is 26.13 kg/m² as calculated from the following:    Height as of 4/3/19: 5' 5\" (1.651 m). Weight as of this encounter: 157 lb (71.2 kg). Physical Exam   Constitutional: She is oriented to person, place, and time. She appears well-developed and well-nourished. No distress. HENT:   Head: Normocephalic and atraumatic. Right Ear: External ear normal.   Left Ear: External ear normal.   Nose: Nose normal.   Mouth/Throat: Oropharynx is clear and moist.   Eyes: Pupils are equal, round, and reactive to light. Conjunctivae and EOM are normal. Right eye exhibits no discharge. Left eye exhibits no discharge. No scleral icterus. Neck: Normal range of motion. Neck supple. No JVD present. No tracheal deviation present. No thyromegaly present. Cardiovascular: Normal rate, normal heart sounds and intact distal pulses. Exam reveals no gallop. No murmur heard. Pulmonary/Chest: Effort normal and breath sounds normal. No respiratory distress. She has no wheezes. She has no rales. She exhibits no tenderness. Abdominal: Soft. Bowel sounds are normal. She exhibits no distension and no mass. There is no tenderness. There is no rebound and no guarding. Musculoskeletal: She exhibits no edema, tenderness or deformity. Lymphadenopathy:     She has no cervical adenopathy.    Neurological: She is alert and oriented to person, place, and time. She displays normal reflexes. No cranial nerve deficit. She exhibits normal muscle tone. Coordination normal.   Decreased knee jerks , kurtis biceps and triceps reflexes. Negative right straight leg lift. Marked spasm and pain in the lower back. Skin: Skin is warm and dry. No rash noted. She is not diaphoretic. No erythema. No pallor. Psychiatric: She has a normal mood and affect. Her behavior is normal. Judgment and thought content normal.       ASSESSMENT/PLAN:     Diagnosis Orders   1. Sciatica, left side improved after completing Medrol Dosepak 70%. New anterior listhesis L4 on L5. Refer to spine doctor. Lucian Camejo MD, Spine Surgery, [facility]   2. History of lumbar laminectomy  Lucian Camejo MD, Spine Surgery, [facility]   3. Vitamin D deficiency monitor level to see if dose adjustment needed. 4.  Vitamin B12    CBC Auto Differential   Other neutropenia (HCC) has been stable and no clinical chills fever and weight loss. Monitor B12 and CBC. Jamee received counseling on the following healthy behaviors: medication adherence    Patient given educational materials on Nutrition    I have instructed Vincent Cobian to complete a self tracking handout on Weights and Smoking and instructed them to bring it with them to her next appointment. Discussed use, benefit, and side effects of prescribed medications. Barriers to medication compliance addressed. All patient questions answered. Pt voiced understanding. Patient is taking over the counter meds and discussed as to how they interact with prescription medications. Scheduled for allergy shots. An electronic signature was used to authenticate this note.     --Zaida Plummer MD on 5/15/2019 at 4:27 PM

## 2019-05-17 ENCOUNTER — TELEPHONE (OUTPATIENT)
Dept: ORTHOPEDIC SURGERY | Age: 54
End: 2019-05-17

## 2019-05-17 LAB — H PYLORI BREATH TEST: POSITIVE

## 2019-05-17 NOTE — TELEPHONE ENCOUNTER
Patient was calling in for a new patient appointment. She was referred TO Dr. Jasmyne Whitman from Dr. Christy Byrne. Spoke with call  attached to this message. She iwll call patient back to schedule new patient appt.

## 2019-05-19 ASSESSMENT — PATIENT HEALTH QUESTIONNAIRE - PHQ9
SUM OF ALL RESPONSES TO PHQ QUESTIONS 1-9: 2
2. FEELING DOWN, DEPRESSED OR HOPELESS: 1
SUM OF ALL RESPONSES TO PHQ QUESTIONS 1-9: 2
SUM OF ALL RESPONSES TO PHQ9 QUESTIONS 1 & 2: 2
1. LITTLE INTEREST OR PLEASURE IN DOING THINGS: 1

## 2019-05-20 ENCOUNTER — TELEPHONE (OUTPATIENT)
Dept: PRIMARY CARE CLINIC | Age: 54
End: 2019-05-20

## 2019-05-21 ENCOUNTER — TELEPHONE (OUTPATIENT)
Dept: PRIMARY CARE CLINIC | Age: 54
End: 2019-05-21

## 2019-05-21 DIAGNOSIS — K21.00 GASTROESOPHAGEAL REFLUX DISEASE WITH ESOPHAGITIS: ICD-10-CM

## 2019-05-21 RX ORDER — BISMUTH SUBSALICYLATE 262 MG/1
262 TABLET, CHEWABLE ORAL
Qty: 40 TABLET | Refills: 0 | Status: SHIPPED | OUTPATIENT
Start: 2019-05-21 | End: 2019-07-30

## 2019-05-21 RX ORDER — LANSOPRAZOLE 30 MG/1
30 CAPSULE, DELAYED RELEASE ORAL DAILY
Qty: 30 CAPSULE | Refills: 3 | Status: SHIPPED | OUTPATIENT
Start: 2019-05-21 | End: 2019-08-14 | Stop reason: SDUPTHER

## 2019-05-21 RX ORDER — METRONIDAZOLE 500 MG/1
500 TABLET ORAL 3 TIMES DAILY
Qty: 30 TABLET | Refills: 0 | Status: SHIPPED | OUTPATIENT
Start: 2019-05-21 | End: 2019-05-31

## 2019-05-21 RX ORDER — CLARITHROMYCIN 500 MG/1
500 TABLET, COATED ORAL 2 TIMES DAILY
Qty: 20 TABLET | Refills: 0 | Status: SHIPPED | OUTPATIENT
Start: 2019-05-21 | End: 2019-05-31

## 2019-05-21 NOTE — TELEPHONE ENCOUNTER
I don't know what to do with this. There is nothing about it in the chart. Pt is either going to have to get it from original prescriber or talk to Dr Rio Jose.

## 2019-05-21 NOTE — TELEPHONE ENCOUNTER
Show:Clear all  [x]Manual[]Template[]Copied    Added by:  [x]Mer[facility]      []Karolny for details      Patient requesting a medication refill. Medication   Topical pain med cream compound  Baclofen 2%  Diclofenac 3%  Gabapentin 6%  Lidocaine 4%  Formula [id number]  Directions- apply 1-2 grams topicallyon affected area 3-4x daily     120 grams=15 day supply    Pharmacy called in prescription sent to pharmacy. Patient also given positive H. pylori test results. Patient also advised to have her has been tested by his doctor. We'll send it treatment to local pharmacy.

## 2019-05-28 RX ORDER — FLUCONAZOLE 150 MG/1
150 TABLET ORAL
Qty: 2 TABLET | Refills: 0 | Status: SHIPPED | OUTPATIENT
Start: 2019-05-28 | End: 2019-06-01

## 2019-06-12 ENCOUNTER — OFFICE VISIT (OUTPATIENT)
Dept: ORTHOPEDIC SURGERY | Age: 54
End: 2019-06-12
Payer: COMMERCIAL

## 2019-06-12 DIAGNOSIS — M96.1 LUMBAR POST-LAMINECTOMY SYNDROME: Primary | ICD-10-CM

## 2019-06-12 DIAGNOSIS — M51.36 DDD (DEGENERATIVE DISC DISEASE), LUMBAR: ICD-10-CM

## 2019-06-12 DIAGNOSIS — M47.816 SPONDYLOSIS WITHOUT MYELOPATHY OR RADICULOPATHY, LUMBAR REGION: ICD-10-CM

## 2019-06-12 DIAGNOSIS — M51.26 HNP (HERNIATED NUCLEUS PULPOSUS), LUMBAR: ICD-10-CM

## 2019-06-12 DIAGNOSIS — M54.16 LUMBAR RADICULOPATHY: ICD-10-CM

## 2019-06-12 PROCEDURE — 99244 OFF/OP CNSLTJ NEW/EST MOD 40: CPT | Performed by: PHYSICAL MEDICINE & REHABILITATION

## 2019-06-12 NOTE — PROGRESS NOTES
New Patient: SPINE    Referring Provider:  Toñito Lee,*    CHIEF COMPLAINT:    Chief Complaint   Patient presents with    Back Pain     new back pain       HISTORY OF PRESENT ILLNESS:      · The patient is being sent at the request of Toñito Lee,* in consultation as a new spine patient for low back pain and right leg pain. The patient is a 47 y.o. female whom reports symptoms for 7 years. Symptoms progressed over the last  3 month. Patient reports there was not a significant event to cause the symptoms. Today discomfort is report at 7 out of 10, describing it as dull, burning. Symptoms are aggravated by: walking. Patient has undergone recent treatment including, hx lumbar fusion 2017 at L4-S1. Patient reports previous lumbar fusion spine surgery with [facility] and spine on 11/2017. · Patient reports about 3-4 months ago she started to have low back pain. She describes that he pain did not seem to stop. She has had more severe pain in the lumbar spine again. She does not describes an accident or injury to be involved with the pain. She feels a burning feeling in her back on the right. Radiating to her right leg with a pinching sensation, she does have some pain down the left leg. After she experiences the pinching sensation, she reports her a tingling sensation starting in her right thigh down to her toes. It happens on the her left side, but to 1/2 of the severity. She describes that she does not work out intensely, but she does like to walk on the treadmill and do light exercise. She has been trying to stretch everyday and move to help with the knots that she will feel in the right buttocks. She has been using a TENS unit to help with the pain.      Pain Assessment  Location of Pain: Back  Location Modifiers: Right  Severity of Pain: 10  Quality of Pain: Aching(burning and pinching)  Duration of Pain: Persistent  Frequency of Pain: Constant  Aggravating Factors: Walking  Limiting Behavior: Some  Relieving Factors:  Other (Comment)  Result of Injury: No  Work-Related Injury: No  Are there other pain locations you wish to document?: No      Associated signs and symptoms:   Neurogenic bowel or bladder symptoms:  no   Perceived weakness:  no   Difficulty walking:  yes    Recent Imaging (within past one year)   Xrays: yes   MRI or CT of spine: yes July of 2017     Current/Past Treatment:   · Physical Therapy:  yes, after the surgery   · Chiropractic:  none  · Injection:  yes before the surgery   · Medications:   NSAIDS:  yes   Muscle relaxer:  none   Steriods:  yes   Neuropathic medications:  none   Opioids:  none  · Previous surgery:  yes  · Previous surgical consult:  yes  · Other:  · Infection control  · Tested positive for MRSA in past 12 months:  no  · Tested positive for MSSA \"staph infection\" in past 12 months: no  · Tested positive for VRE (Vancomycin Resistant Enterococci) in past 12 months:   no  · Currently on any antibiotics for an infection: no  · Anticoagulants:  · On a blood thinner:  no   · Any history of bleeding disorder: no   · MRI Contraindication: no   · Previous Pain Management: no             Past Medical History:   Past Medical History:   Diagnosis Date    Allergic rhinitis     Chronic back pain     l4-L5 lumbar spinal stenosis    Constipation     Diverticulosis     Hyperlipidemia       Past Surgical History:     Past Surgical History:   Procedure Laterality Date    HYSTERECTOMY  1999    endometriosis    HYSTERECTOMY, TOTAL ABDOMINAL      one ovary remains    LUMBAR FUSION Left 11/10/2017    LEFT L4-L5, L5-S1 TRANSFORAMINAL LUMBAR INTERBODY FUSION WITH with CT navigation    SALPINGO-OOPHORECTOMY      unilat - rt - WITH HYSTERECTOMY    TUBAL LIGATION  1993     Current Medications:     Current Outpatient Medications:     bismuth subsalicylate ([facility] BISMUTH) 262 MG chewable tablet, Take 1 tablet by mouth 4 times daily (before meals and nightly), Disp: 40 tablet, Rfl: EXAM:    Vitals: Resp. rate 14, height 5' 5\" (1.651 m), weight 151 lb (68.5 kg), last menstrual period 01/13/1999, not currently breastfeeding. GENERAL EXAM:  · General Apparence: Patient is adequately groomed with no evidence of malnutrition. · Psychiatric: Orientation: The patient is oriented to time, place and person. The patient's mood and affect are appropriate   · Vascular: Examination reveals no swelling and palpation reveals no tenderness in upper or lower extremities. Good capillary refill. · The lymphatic examination of the neck, axillae and groin reveals all areas to be without enlargement or induration   Sensation is intact without deficit in the upper and lower extremities to light touch and pinprick  · Coordination of the upper and lower extremities are normal.    CERVICAL EXAMINATION:  · Inspection: Local inspection shows no step-off or bruising. Cervical alignment is normal. No instability is noted. · Palpation and Percussion: No evidence of tenderness at the midline. Paraspinal tenderness is not present. There is no paraspinal spasm. · Range of Motion:  pain-free ROM   · Strength: 5/5 bilateral upper extremities  · Special Tests:   Spurling's and Zaman's are negative bilaterally. [place] and Impingement tests are negative bilaterally. · Skin:There are no rashes, ulcerations or lesions. · Reflexes: Bilaterally triceps, biceps and brachioradialis are 2+. Clonus absent bilaterally at the feet. No pathological reflexes are noted. · Gait & station: antalgic on the right, no ataxia  · Additional Examinations:  · RIGHT UPPER EXTREMITY:  Inspection/examination of the right upper extremity does not show any tenderness, deformity or injury. Range of motion is normal and pain-free. There is no gross instability. There are no rashes, ulcerations or lesions. Strength and tone are normal. No atrophy or abnormal movements are noted.   · LEFT UPPER EXTREMITY: Inspection/examination of the left upper extremity does not show any tenderness, deformity or injury. Range of motion is normal and pain-free. There is no gross instability. There are no rashes, ulcerations or lesions. Strength and tone are normal. No atrophy or abnormal movements are noted. LUMBAR/SACRAL EXAMINATION:  · Inspection: Local inspection shows no step-off or bruising. Lumbar alignment is normal. No instability is noted. · Palpation:   No evidence of tenderness at the midline. Lumbar paraspinal tenderness Mild L4/5 and L5/S1 tenderness  Bursal tenderness No tenderness bilaterally  There is no paraspinal spasm. · Range of Motion: limited by 25% in all planes due to pain  · Strength:   Strength testing is 5/5 in all muscle groups tested. · Special Tests:   Straight leg raise and crossed SLR negative. Rik's testing is negative bilaterally. FADIR's testing is negative bilaterally. · Skin: There are no rashes, ulcerations or lesions. · Reflexes: Reflexes are symmetrically 2+ at the patellar and ankle tendons. Clonus absent bilaterally at the feet. · Additional Examinations:  · RIGHT LOWER EXTREMITY: Inspection/examination of the right lower extremity does not show any tenderness, deformity or injury. Range of motion is unremarkable. There is no gross instability. There are no rashes, ulcerations or lesions. Strength and tone are normal. No atrophy or abnormal movements are noted. · LEFT LOWER EXTREMITY:  Inspection/examination of the left lower extremity does not show any tenderness, deformity or injury. Range of motion is unremarkable. There is no gross instability. There are no rashes, ulcerations or lesions. Strength and tone are normal. No atrophy or abnormal movements are noted.       Diagnostic Testing:    Xrays:   I personally reviewed images of the AP and lateral of the lumbar spine from 4/23/2019 showing L4 through S1 interval posterior fusion, L4-5 anterolisthesis correction of previous L5 anterolisthesis  MRI or CT: None  EMG:  None  Results for orders placed or performed in visit on 05/15/19   H PYLORI BREATH TEST   Result Value Ref Range    H Pylori Breath Test Positive (A) Negative       Impression (Medical Decision Making):       1. Lumbar post-laminectomy syndrome    2. HNP (herniated nucleus pulposus), lumbar    3. DDD (degenerative disc disease), lumbar    4. Spondylosis without myelopathy or radiculopathy, lumbar region    5. Lumbar radiculopathy        Plan (Medical Decision Making):    I discussed the diagnosis and the treatment options with [facility] today. In Summary:  The various treatment options were outlined and discussed with [facility] including:  Conservative care options: physical therapy, ice, medications, bracing, and activity modification. The indications for therapeutic injections. The indications for additional imaging/laboratory studies. The indications for (possible future) interventions. After considering the various options discussed, [facility] elected to pursue a course of treatment that includes the followin. Medications: Continue anti-inflammatories with appropriate GI Precautions including to stop if develop dark tarry stools or GI upset and to take with food. 2. PT:  Encouraged to continue with HEP. 3. Further studies:  MR lumbar spine with and without IV contrast to evaluate for new disc herniation. 4. Interventional:  After further imaging is obtained, interventional options will be reviewed and recommended.     5. Healthy Lifestyle Measures:  Patient education material reviewing the following was distributed to [facility]  Anatomic drawings  Healthy lifestyle education  Osteoporosis prevention,   Back and neck pain educational information   Advanced imaging preparedness    Posture education   Proper lifting and carrying techniques,   Weight management  Quitting smoking and   Minor ways to treat back pain  For further information regarding the spine conditions and to review interventional treatments the patient was directed to [facility].    6.  Follow up:  1-2 weeks    Lisandra Malloy was instructed to call the office if her symptoms worsen or if new symptoms appear prior to the next scheduled visit. She is specifically instructed to contact the office between now & her scheduled appointment if she has concerns related to her condition or if she needs assistance in scheduling the above tests. She is welcome to call for an appointment sooner if she has any additional concerns or questions. I, Dr. Clementina Norris, personally performed the services described in this documentation as scribed by Roxie Hanna MA, in my presence and it is both accurate and complete. Neville Duarte. Wilbert Pike MD, MALINA, [facility]  Board Certified in [address] [place] [address] Certified and Fellowship Trained in [facility] ([facility])     This dictation was performed with a verbal recognition program [facility]) and it was checked for errors. It is possible that there are still dictated errors within this office note. If so, please bring any errors to my attention for an addendum. All efforts were made to ensure that this office note is accurate.

## 2019-06-13 ENCOUNTER — TELEPHONE (OUTPATIENT)
Dept: ORTHOPEDIC SURGERY | Age: 54
End: 2019-06-13

## 2019-06-13 NOTE — TELEPHONE ENCOUNTER
L/M FOR PATIENT regarding MRI LSP approval and authorization being valid until 7/11/2019. Patient was instructed to call [address] [place] [address] to schedule MRI LSP, then contact our office for follow up appointment. MRI LSP results will not be given over the phone. Patient was also asked to allow a minimum 48 hours for follow up appointment from MRI scan in order for staff to obtain MRI report. Patient currently has a follow up appointment schedule for 6/26/2019 AT 3:15PM AT [facility]. Advised to contact the office if needing to reschedule this to accommodate MRI scan.

## 2019-06-25 ENCOUNTER — TELEPHONE (OUTPATIENT)
Dept: PRIMARY CARE CLINIC | Age: 54
End: 2019-06-25

## 2019-06-25 NOTE — TELEPHONE ENCOUNTER
Jennifer Rebolledo returned my call about paperwork we received. Pt already filled it out. If Dr. Cary Rojo agrees with what she filled out, she can just sign it and give back to patient to return to them. If Gwyn Pierre does not agree or wants to add to it, have patient provide us with a black copy. Please give to patient to return to [facility] when complete. For any further questions, contact patient or Jennifer Rebolledo @ [address] [place] [address]. Reford Valentine  [phone]  PATIENT:  [phone]    NOTE:  The filled out form has been scanned in Epic. The original is on [facility] desk.

## 2019-06-26 ENCOUNTER — OFFICE VISIT (OUTPATIENT)
Dept: ORTHOPEDIC SURGERY | Age: 54
End: 2019-06-26
Payer: COMMERCIAL

## 2019-06-26 VITALS
SYSTOLIC BLOOD PRESSURE: 118 MMHG | WEIGHT: 151.01 LBS | HEIGHT: 65 IN | BODY MASS INDEX: 25.16 KG/M2 | HEART RATE: 78 BPM | DIASTOLIC BLOOD PRESSURE: 74 MMHG

## 2019-06-26 DIAGNOSIS — M43.16 SPONDYLOLISTHESIS OF LUMBAR REGION: ICD-10-CM

## 2019-06-26 DIAGNOSIS — M89.8X8 MASS OF SPINE: Primary | ICD-10-CM

## 2019-06-26 DIAGNOSIS — M54.16 LUMBAR RADICULOPATHY: ICD-10-CM

## 2019-06-26 DIAGNOSIS — M51.26 PROTRUSION OF LUMBAR INTERVERTEBRAL DISC: ICD-10-CM

## 2019-06-26 DIAGNOSIS — M96.1 LUMBAR POST-LAMINECTOMY SYNDROME: ICD-10-CM

## 2019-06-26 PROCEDURE — 99214 OFFICE O/P EST MOD 30 MIN: CPT | Performed by: PHYSICAL MEDICINE & REHABILITATION

## 2019-06-26 NOTE — PROGRESS NOTES
Follow up: Clay Amend  1965  [id number]         Chief Complaint   Patient presents with    Lower Back Pain     fua TR MRI LSP. HISTORY OF PRESENT ILLNESS:  Ms. Wali Lozano is a 47 y.o. female returns for a follow up visit for multiple medical problems. Her current presenting problems are   1. Mass of spine    2. Lumbar post-laminectomy syndrome    3. Lumbar radiculopathy    4. Protrusion of lumbar intervertebral disc    5. Spondylolisthesis of lumbar region    . As per information/history obtained from the PADT(patient assessment and documentation tool) - She complains of pain in the lower back with radiation to the right leg She rates the pain 10/10 and describes it as aching, burning, pinching. Pain is made worse by: walking. She denies side effects from the current pain regimen. Patient reports that since the last follow up visit the physical functioning is unchanged, family/social relationships are unchanged, mood is unchanged and sleep patterns are unchanged, and that the overall functioning is unchanged. Patient denies neurological bowel or bladder. Patient returns today for follow up evaluation and review of her lumbar spine MRI images. She reports her pain and symptoms are largely unchanged from her previous visit dated 6/12/2019. The patient denies any new injuries or triggers at this time. They are not currently ambulating with any assistive devices in the office today.        Associated signs and symptoms:   Neurogenic bowel or bladder symptoms:  no   Perceived weakness:  no   Difficulty walking:  yes              Past Medical History:   Past Medical History:   Diagnosis Date    Allergic rhinitis     Chronic back pain     l4-L5 lumbar spinal stenosis    Constipation     Diverticulosis     Hyperlipidemia       Past Surgical History:     Past Surgical History:   Procedure Laterality Date    HYSTERECTOMY  1999    endometriosis    HYSTERECTOMY, TOTAL ABDOMINAL      one ovary remains    LUMBAR FUSION Left 11/10/2017    LEFT L4-L5, L5-S1 TRANSFORAMINAL LUMBAR INTERBODY FUSION WITH with CT navigation    SALPINGO-OOPHORECTOMY      unilat - rt - WITH HYSTERECTOMY    TUBAL LIGATION  1993     Current Medications:     Current Outpatient Medications:     bismuth subsalicylate ([facility] BISMUTH) 262 MG chewable tablet, Take 1 tablet by mouth 4 times daily (before meals and nightly), Disp: 40 tablet, Rfl: 0    lansoprazole (PREVACID) 30 MG delayed release capsule, Take 1 capsule by mouth daily, Disp: 30 capsule, Rfl: 3    fexofenadine (ALLEGRA) 180 MG tablet, Take 1 tablet by mouth daily Stop zyrtec, Disp: 30 tablet, Rfl: 5    calcium carbonate-vitamin D (CALCIUM 600+D) 600-200 MG-UNIT TABS, Take 1 each by mouth 2 times daily, Disp: 60 tablet, Rfl: 11    topiramate (TOPAMAX) 50 MG tablet, Take 1 tablet by mouth 2 times daily, Disp: 60 tablet, Rfl: 5    EST ESTROGENS-METHYLTEST DS 1.25-2.5 MG TABS, TAKE 1 TABLET BY MOUTH ONCE DAILY, Disp: 30 tablet, Rfl: 3    baclofen (LIORESAL) 10 MG tablet, TAKE ONE TABLET BY MOUTH THREE TIMES DAILY, Disp: 90 tablet, Rfl: 5    azelastine (ASTELIN) 0.1 % nasal spray, 1 spray by Nasal route 2 times daily Use in each nostril as directed, Disp: 1 Bottle, Rfl: 5    traZODone (DESYREL) 100 MG tablet, Take 1 tablet by mouth nightly, Disp: 30 tablet, Rfl: 5    ketoconazole (NIZORAL) 2 % cream, Apply topically twice a daily, Disp: 30 g, Rfl: 1    Cholecalciferol (VITAMIN D) 2000 units CAPS capsule, Take 1 capsule by mouth daily, Disp: 30 capsule, Rfl: 1    albuterol sulfate HFA (VENTOLIN HFA) 108 (90 Base) MCG/ACT inhaler, Inhale 2 puffs into the lungs every 6 hours as needed for Wheezing, Disp: 1 Inhaler, Rfl: 3  Allergies:  Fruit acid concentrate; Penicillins; Pollen extract; Tomato; Bee pollen; Fruit extracts; Influenza virus vacc split pf; and Influenza vaccines  Social History:    reports that she has never smoked.  She has never used smokeless tobacco. She reports that she drinks alcohol. She reports that she does not use drugs. Family History:   Family History   Problem Relation Age of Onset    Heart Disease Mother     High Blood Pressure Mother     Diabetes Mother     Heart Disease Father     Stroke Father     High Blood Pressure Father     Heart Disease Paternal Grandmother        REVIEW OF SYSTEMS:   CONSTITUTIONAL: Denies unexplained weight loss, fevers, chills or fatigue  NEUROLOGICAL: Denies unsteady gait or progressive weakness  MUSCULOSKELETAL: Denies joint swelling or redness  GI: Denies nausea, vomiting, diarrhea   : Denies bowel or bladder issues       PHYSICAL EXAM:    Vitals: Blood pressure 118/74, pulse 78, height 5' 5\" (1.651 m), weight 151 lb 0.2 oz (68.5 kg), last menstrual period 01/13/1999, not currently breastfeeding. GENERAL EXAM:  · General Apparence: Patient is adequately groomed with no evidence of malnutrition. · Psychiatric: Orientation: The patient is oriented to time, place and person. The patient's mood and affect are appropriate   · Vascular: Examination reveals no swelling and palpation reveals no tenderness in upper or lower extremities. Good capillary refill. · The lymphatic examination of the neck, axillae and groin reveals all areas to be without enlargement or induration  · Sensation is intact without deficit in the upper and lower extremities to light touch and pinprick  · Coordination of the upper and lower extremities are normal.  · Additional Examinations:  · RIGHT UPPER EXTREMITY:  Inspection/examination of the right upper extremity does not show any tenderness, deformity or injury. Range of motion is unremarkable and pain-free. There is no gross instability. There are no rashes, ulcerations or lesions. Strength and tone are normal. No atrophy or abnormal movements are noted. · LEFT UPPER EXTREMITY: Inspection/examination of the left upper extremity does not show any tenderness, deformity or injury.  Range of motion is unremarkable and pain-free. There is no gross instability. There are no rashes, ulcerations or lesions. Strength and tone are normal. No atrophy or abnormal movements are noted. LUMBAR/SACRAL EXAMINATION:  · Inspection: Local inspection shows no step-off or bruising. Lumbar alignment is normal. No instability is noted. · Palpation:   No evidence of tenderness at the midline. Lumbar paraspinal tenderness: Mild L4/5 and L5/S1 tenderness  Bursal tenderness No tenderness bilaterally  There is no paraspinal spasm. · Range of Motion: limited by 25% in all planes due to pain  · Strength:   Strength testing is 5/5 in all muscle groups tested. · Special Tests:   Straight leg raise and crossed SLR negative  · Skin: There are no rashes, ulcerations or lesions. · Reflexes: Reflexes are symmetrically 1+ at the patellar and ankle tendons. Clonus absent bilaterally at the feet. · Gait & station: normal, patient ambulates without assistance and no ataxia  · Additional Examinations:  · RIGHT LOWER EXTREMITY: Inspection/examination of the right lower extremity does not show any tenderness, deformity or injury. Range of motion is normal and pain-free. There is no gross instability. There are no rashes, ulcerations or lesions. Strength and tone are normal. No atrophy or abnormal movements are noted. · LEFT LOWER EXTREMITY:  Inspection/examination of the left lower extremity does not show any tenderness, deformity or injury. Range of motion is normal and pain-free. There is no gross instability. There are no rashes, ulcerations or lesions. Strength and tone are normal. No atrophy or abnormal movements are noted. Diagnostic Testing:    MR Lumbar spine shows 6/19/19  1.  L4-5 left laminectomy and facetectomy; interbody and posterolateral fusion.  Fusion    hardware.  Trace anterolisthesis; listhesis disc contacting foraminal right L4 nerve root.     Anterior extradural and left L4 foraminal perineural fat enhancing granulation tissue. 2. Item to L5-S1 left laminectomy.  Interbody and posterolateral fusion; fusion hardware.     Trace anterolisthesis; listhesed disc very gently encroaching upon right ventral dural sac and    right S1 nerve root.  Mild anterior extradural and right foraminal L5 perineural enhancing    granulation tissue. 3. A cm ellipsoid lesion at T12 vertebral body; indeterminate etiology.  Will request Ms. Triana    to return for additional dataset in efforts to differentiate benign from malignant    characteristics.  Addended report will be forwarded. Addended report does show enhancing lesion at the T12 level. Results for orders placed or performed in visit on 05/15/19   H PYLORI BREATH TEST   Result Value Ref Range    H Pylori Breath Test Positive (A) Negative     Impression:       1. Mass of spine    2. Lumbar post-laminectomy syndrome    3. Lumbar radiculopathy    4. Protrusion of lumbar intervertebral disc    5. Spondylolisthesis of lumbar region        Plan:  Clinical Course: Above diagnoses are worsening    I discussed the diagnosis and the treatment options with Lisandra Malloy today. In Summary:  The various treatment options were outlined and discussed with Lisandra Malloy including:  Conservative care options: physical therapy, ice, medications, bracing, and activity modification. The indications for therapeutic injections. The indications for additional imaging/laboratory studies. The indications for (possible future) interventions. After considering the various options discussed, Dewaynebinta Gama elected to pursue a course of treatment that includes the followin. Medications:  No further recommendations for new medications. 2. PT:  Encouraged to continue with HEP. 3. Further studies:  Bone scan to evaluate for indeterminate T12 lesion    4. Interventional:  Consider caudal BLADIMIR after bone scan and PT    5.  Follow up:  4-6 weeks      Lisandra Malloy was instructed to call the office if her symptoms worsen or if new symptoms appear prior to the next scheduled visit. She is specifically instructed to contact the office between now & her scheduled appointment if she has concerns related to her condition or if she needs assistance in scheduling the above tests. She is welcome to call for an appointment sooner if she has any additional concerns or questions. Sandie Bird ATC, am scribing for and in the presence of Dr. Guru Coombs.   06/26/19 5:06 PM MANSI Harmon, Dr. Ulysses Norris, personally performed the services described in this documentation as scribed by VANCE Harmon in my presence and it is both accurate and complete. Severiano Cunning. Kevin Pierson MD, MALINA, [facility]  Board Certified in [address] [place] [address] Certified and Fellowship Trained in [facility] ([facility])             This dictation was performed with a verbal recognition program [facility]) and it was checked for errors. It is possible that there are still dictated errors within this office note. If so, please bring any errors to my attention for an addendum. All efforts were made to ensure that this office note is accurate.

## 2019-06-27 ENCOUNTER — TELEPHONE (OUTPATIENT)
Dept: ORTHOPEDIC SURGERY | Age: 54
End: 2019-06-27

## 2019-06-27 NOTE — TELEPHONE ENCOUNTER
S/W PATIENT regarding BONE SCAN approval and authorization being valid until N/A. Patient was instructed to call [facility] to schedule BONE SCAN, then contact our office for follow up appointment. BONE SCAN results will not be given over the phone. Patient currently has a follow up appointment schedule for N/A - NEEDS SCHEDULED. Advised to contact the office if needing to reschedule this to accommodate BONE scan. Patient voiced understanding of BONE SCAN results not being given over the phone.

## 2019-07-02 ENCOUNTER — HOSPITAL ENCOUNTER (OUTPATIENT)
Dept: NUCLEAR MEDICINE | Age: 54
Discharge: HOME | End: 2019-07-02
Payer: COMMERCIAL

## 2019-07-02 DIAGNOSIS — M89.8X8 MASS OF SPINE: ICD-10-CM

## 2019-07-02 PROCEDURE — A9503 TC99M MEDRONATE: HCPCS | Performed by: PHYSICAL MEDICINE & REHABILITATION

## 2019-07-02 PROCEDURE — 78306 BONE IMAGING WHOLE BODY: CPT

## 2019-07-02 PROCEDURE — 3430000000 HC RX DIAGNOSTIC RADIOPHARMACEUTICAL: Performed by: PHYSICAL MEDICINE & REHABILITATION

## 2019-07-02 RX ORDER — TC 99M MEDRONATE 20 MG/10ML
25 INJECTION, POWDER, LYOPHILIZED, FOR SOLUTION INTRAVENOUS
Status: COMPLETED | OUTPATIENT
Start: 2019-07-02 | End: 2019-07-02

## 2019-07-02 RX ADMIN — TC 99M MEDRONATE 25 MILLICURIE: 20 INJECTION, POWDER, LYOPHILIZED, FOR SOLUTION INTRAVENOUS at 08:34

## 2019-07-05 ENCOUNTER — TELEPHONE (OUTPATIENT)
Dept: ORTHOPEDIC SURGERY | Age: 54
End: 2019-07-05

## 2019-07-06 VITALS — WEIGHT: 151 LBS | BODY MASS INDEX: 25.16 KG/M2 | HEIGHT: 65 IN | RESPIRATION RATE: 14 BRPM

## 2019-07-11 DIAGNOSIS — M89.8X9 BONE MASS: Primary | ICD-10-CM

## 2019-07-12 ENCOUNTER — OFFICE VISIT (OUTPATIENT)
Dept: ORTHOPEDIC SURGERY | Age: 54
End: 2019-07-12
Payer: COMMERCIAL

## 2019-07-12 VITALS
HEIGHT: 66 IN | RESPIRATION RATE: 14 BRPM | HEART RATE: 65 BPM | SYSTOLIC BLOOD PRESSURE: 115 MMHG | BODY MASS INDEX: 24.11 KG/M2 | DIASTOLIC BLOOD PRESSURE: 72 MMHG | WEIGHT: 150 LBS

## 2019-07-12 DIAGNOSIS — M48.9 MASS OF THORACIC VERTEBRA: ICD-10-CM

## 2019-07-12 DIAGNOSIS — M43.16 SPONDYLOLISTHESIS OF LUMBAR REGION: ICD-10-CM

## 2019-07-12 DIAGNOSIS — M51.26 HNP (HERNIATED NUCLEUS PULPOSUS), LUMBAR: Primary | ICD-10-CM

## 2019-07-12 DIAGNOSIS — M54.16 LUMBAR RADICULOPATHY: ICD-10-CM

## 2019-07-12 PROCEDURE — 99214 OFFICE O/P EST MOD 30 MIN: CPT | Performed by: PHYSICAL MEDICINE & REHABILITATION

## 2019-07-12 RX ORDER — MELOXICAM 15 MG/1
15 TABLET ORAL DAILY
Qty: 30 TABLET | Refills: 0 | Status: SHIPPED | OUTPATIENT
Start: 2019-07-12 | End: 2019-07-30 | Stop reason: SDUPTHER

## 2019-07-12 NOTE — PROGRESS NOTES
Follow up: Haley Schwab  1965  [id number]         Chief Complaint   Patient presents with    Lower Back Pain         HISTORY OF PRESENT ILLNESS:  Ms. Cynthia Haddad is a 47 y.o. female returns for a follow up visit for multiple medical problems. Her current presenting problems are   1. HNP (herniated nucleus pulposus), lumbar    2. Spondylolisthesis of lumbar region    3. Lumbar radiculopathy    4. Mass of thoracic vertebra    . As per information/history obtained from the PADT(patient assessment and documentation tool) - She complains of pain in the lower back with radiation to the upper leg Bilateral and lower leg Bilateral She rates the pain 8/10 and describes it as aching. Pain is made worse by: movement. She denies side effects from the current pain regimen. Patient reports that since the last follow up visit the physical functioning is unchanged, family/social relationships are unchanged, mood is unchanged and sleep patterns are unchanged, and that the overall functioning is unchanged. Patient denies neurological bowel or bladder. She presents with continued low back pain radiating to the right leg. She reports her pain is quite severe. She is frustrated that she is Ari had back surgery in the left side and she has new pain. She reports she was told that she will be able to exercise and do all of her activities without any problems. She is asking for new medication to help her with her pain. She returns after undergoing a bone scan due to an ellipsoid lesion seen on MRI of the lumbar spine.         Associated signs and symptoms:   Neurogenic bowel or bladder symptoms:  no   Perceived weakness:  no   Difficulty walking:  yes              Past Medical History:   Past Medical History:   Diagnosis Date    Allergic rhinitis     Chronic back pain     l4-L5 lumbar spinal stenosis    Constipation     Diverticulosis     Hyperlipidemia       Past Surgical History:     Past Surgical History: Procedure Laterality Date    HYSTERECTOMY  1999    endometriosis    HYSTERECTOMY, TOTAL ABDOMINAL      one ovary remains    LUMBAR FUSION Left 11/10/2017    LEFT L4-L5, L5-S1 TRANSFORAMINAL LUMBAR INTERBODY FUSION WITH with CT navigation    SALPINGO-OOPHORECTOMY      unilat - rt - WITH HYSTERECTOMY    TUBAL LIGATION  1993     Current Medications:     Current Outpatient Medications:     meloxicam (MOBIC) 15 MG tablet, Take 1 tablet by mouth daily, Disp: 30 tablet, Rfl: 0    bismuth subsalicylate ([employer] BISMUTH) 262 MG chewable tablet, Take 1 tablet by mouth 4 times daily (before meals and nightly), Disp: 40 tablet, Rfl: 0    lansoprazole (PREVACID) 30 MG delayed release capsule, Take 1 capsule by mouth daily, Disp: 30 capsule, Rfl: 3    fexofenadine (ALLEGRA) 180 MG tablet, Take 1 tablet by mouth daily Stop zyrtec, Disp: 30 tablet, Rfl: 5    calcium carbonate-vitamin D (CALCIUM 600+D) 600-200 MG-UNIT TABS, Take 1 each by mouth 2 times daily, Disp: 60 tablet, Rfl: 11    topiramate (TOPAMAX) 50 MG tablet, Take 1 tablet by mouth 2 times daily, Disp: 60 tablet, Rfl: 5    EST ESTROGENS-METHYLTEST DS 1.25-2.5 MG TABS, TAKE 1 TABLET BY MOUTH ONCE DAILY, Disp: 30 tablet, Rfl: 3    baclofen (LIORESAL) 10 MG tablet, TAKE ONE TABLET BY MOUTH THREE TIMES DAILY, Disp: 90 tablet, Rfl: 5    azelastine (ASTELIN) 0.1 % nasal spray, 1 spray by Nasal route 2 times daily Use in each nostril as directed, Disp: 1 Bottle, Rfl: 5    traZODone (DESYREL) 100 MG tablet, Take 1 tablet by mouth nightly, Disp: 30 tablet, Rfl: 5    ketoconazole (NIZORAL) 2 % cream, Apply topically twice a daily, Disp: 30 g, Rfl: 1    Cholecalciferol (VITAMIN D) 2000 units CAPS capsule, Take 1 capsule by mouth daily, Disp: 30 capsule, Rfl: 1    albuterol sulfate HFA (VENTOLIN HFA) 108 (90 Base) MCG/ACT inhaler, Inhale 2 puffs into the lungs every 6 hours as needed for Wheezing, Disp: 1 Inhaler, Rfl: 3  Allergies:  Fruit acid concentrate;

## 2019-07-12 NOTE — LETTER
Please schedule the following with:     Date:   2019 @ 2:40 PM     Account: [id number]  Patient: Duong Etienne    : 1965  Address:  [address] [address]    Phone (H):  [phone] (home)      ----------------------------------------------------------------------------------------------  Diagnosis:     ICD-10-CM    1. HNP (herniated nucleus pulposus), lumbar M51.26 meloxicam (MOBIC) 15 MG tablet   2. Spondylolisthesis of lumbar region M43.16 meloxicam (MOBIC) 15 MG tablet   3. Lumbar radiculopathy M54.16 meloxicam (MOBIC) 15 MG tablet   4. Mass of thoracic vertebra R22.2 MRI THORACIC SPINE W WO CONTRAST     meloxicam (MOBIC) 15 MG tablet         Levels:Right L4 and L5 Transforaminal BLADIMIR x2  Procedure type BLADIMIR  Side RIGHT  CPT Codes 18450, 82412    ----------------------------------------------------------------------------------------------  Injection #   [id number]    Attending Physician       Juan Pablo Duran.  Elio Castillo MD.      ----------------------------------------------------------------------------------------------  Injection Scheduled For:    At:    [address] [facility]    Pre-Cert#    [employer]     Pre-Cert#    Comments or Special instructions:    · Infection control  · Tested positive for MRSA in past 12 months:  no  · Tested positive for MSSA \"staph infection\" in past 12 months: no  · Tested positive for VRE (Vancomycin Resistant Enterococci) in past 12 months:   no  · Currently on any antibiotics for an infection: no  · Anticoagulants:  · On a blood thinner:  no   · Any history of bleeding disorder: no   · Advanced Liver disease: no   · Advanced Renal disease: no   · Glaucoma: no   · Diabetes: no     Sedation:  Yes  -----------------------------------------------------------------------------------------------  Allergies   Allergen Reactions    Fruit Acid Concentrate      Tomato, strawberries    Penicillins Hives    Pollen Extract     Tomato Anaphylaxis     Vomiting  Vomiting

## 2019-07-16 ENCOUNTER — TELEPHONE (OUTPATIENT)
Dept: ORTHOPEDIC SURGERY | Age: 54
End: 2019-07-16

## 2019-07-18 ENCOUNTER — TELEPHONE (OUTPATIENT)
Dept: ORTHOPEDIC SURGERY | Age: 54
End: 2019-07-18

## 2019-07-29 ENCOUNTER — TELEPHONE (OUTPATIENT)
Dept: ORTHOPEDIC SURGERY | Age: 54
End: 2019-07-29

## 2019-07-30 DIAGNOSIS — A04.8 H. PYLORI INFECTION: ICD-10-CM

## 2019-07-30 DIAGNOSIS — M43.16 SPONDYLOLISTHESIS OF LUMBAR REGION: ICD-10-CM

## 2019-07-30 DIAGNOSIS — M51.26 HNP (HERNIATED NUCLEUS PULPOSUS), LUMBAR: ICD-10-CM

## 2019-07-30 DIAGNOSIS — M48.062 SPINAL STENOSIS OF LUMBAR REGION WITH NEUROGENIC CLAUDICATION: Primary | ICD-10-CM

## 2019-07-30 DIAGNOSIS — M54.16 LUMBAR RADICULOPATHY: ICD-10-CM

## 2019-07-30 DIAGNOSIS — M48.9 MASS OF THORACIC VERTEBRA: ICD-10-CM

## 2019-07-30 RX ORDER — FLUCONAZOLE 150 MG/1
150 TABLET ORAL ONCE
Qty: 1 TABLET | Refills: 0 | Status: SHIPPED | OUTPATIENT
Start: 2019-07-30 | End: 2019-07-30

## 2019-07-30 RX ORDER — MELOXICAM 15 MG/1
15 TABLET ORAL DAILY
Qty: 30 TABLET | Refills: 0 | Status: SHIPPED | OUTPATIENT
Start: 2019-07-30 | End: 2019-08-23 | Stop reason: ALTCHOICE

## 2019-07-30 RX ORDER — GABAPENTIN 300 MG/1
300 CAPSULE ORAL 3 TIMES DAILY
Qty: 90 CAPSULE | Refills: 5 | Status: SHIPPED | OUTPATIENT
Start: 2019-07-30 | End: 2019-08-14 | Stop reason: ALTCHOICE

## 2019-08-05 ENCOUNTER — APPOINTMENT (OUTPATIENT)
Dept: GENERAL RADIOLOGY | Age: 54
End: 2019-08-05
Attending: PHYSICAL MEDICINE & REHABILITATION
Payer: COMMERCIAL

## 2019-08-05 ENCOUNTER — HOSPITAL ENCOUNTER (OUTPATIENT)
Age: 54
Setting detail: OUTPATIENT SURGERY
Discharge: HOME | End: 2019-08-05
Attending: PHYSICAL MEDICINE & REHABILITATION | Admitting: PHYSICAL MEDICINE & REHABILITATION
Payer: COMMERCIAL

## 2019-08-05 VITALS
WEIGHT: 153 LBS | OXYGEN SATURATION: 100 % | SYSTOLIC BLOOD PRESSURE: 107 MMHG | RESPIRATION RATE: 16 BRPM | DIASTOLIC BLOOD PRESSURE: 71 MMHG | BODY MASS INDEX: 24.59 KG/M2 | HEIGHT: 66 IN | HEART RATE: 54 BPM | TEMPERATURE: 98.6 F

## 2019-08-05 PROCEDURE — 6360000002 HC RX W HCPCS: Performed by: PHYSICAL MEDICINE & REHABILITATION

## 2019-08-05 PROCEDURE — 3610000056 HC PAIN LEVEL 4 BASE (NON-OR): Performed by: PHYSICAL MEDICINE & REHABILITATION

## 2019-08-05 PROCEDURE — 99152 MOD SED SAME PHYS/QHP 5/>YRS: CPT | Performed by: PHYSICAL MEDICINE & REHABILITATION

## 2019-08-05 PROCEDURE — 2500000003 HC RX 250 WO HCPCS: Performed by: PHYSICAL MEDICINE & REHABILITATION

## 2019-08-05 PROCEDURE — 2709999900 HC NON-CHARGEABLE SUPPLY: Performed by: PHYSICAL MEDICINE & REHABILITATION

## 2019-08-05 PROCEDURE — 3209999900 FLUORO FOR SURGICAL PROCEDURES

## 2019-08-05 RX ORDER — METHYLPREDNISOLONE ACETATE 80 MG/ML
INJECTION, SUSPENSION INTRA-ARTICULAR; INTRALESIONAL; INTRAMUSCULAR; SOFT TISSUE
Status: COMPLETED | OUTPATIENT
Start: 2019-08-05 | End: 2019-08-05

## 2019-08-05 RX ORDER — BUPIVACAINE HYDROCHLORIDE 5 MG/ML
INJECTION, SOLUTION PERINEURAL
Status: COMPLETED | OUTPATIENT
Start: 2019-08-05 | End: 2019-08-05

## 2019-08-05 RX ORDER — MIDAZOLAM HYDROCHLORIDE 1 MG/ML
INJECTION INTRAMUSCULAR; INTRAVENOUS
Status: COMPLETED | OUTPATIENT
Start: 2019-08-05 | End: 2019-08-05

## 2019-08-05 RX ORDER — LIDOCAINE HYDROCHLORIDE 10 MG/ML
INJECTION, SOLUTION INFILTRATION; PERINEURAL
Status: COMPLETED | OUTPATIENT
Start: 2019-08-05 | End: 2019-08-05

## 2019-08-05 ASSESSMENT — PAIN SCALES - GENERAL
PAINLEVEL_OUTOF10: 0
PAINLEVEL_OUTOF10: 0

## 2019-08-05 ASSESSMENT — PAIN - FUNCTIONAL ASSESSMENT: PAIN_FUNCTIONAL_ASSESSMENT: 0-10

## 2019-08-05 NOTE — H&P
HISTORY AND PHYSICAL/PRE-SEDATION ASSESSMENT    Patient:  Kamran Dickens   :  1965  Medical Record No.:  [id number]   Date:  2019  Physician:  Harvey Malcolm M.D. Facility: [address] [place] [address]    HISTORY OF PRESENT ILLNESS:                 The patient is a 47 y.o. female whom presents with lower back and right leg pain. Review of the imaging and physical exam of the patient confirmed the pre-procedure diagnosis. After a thorough discussion of risks, benefits and alternatives informed consent was obtained. Past Medical History:   Past Medical History:   Diagnosis Date    Allergic rhinitis     Chronic back pain     l4-L5 lumbar spinal stenosis    Constipation     Diverticulosis     Hyperlipidemia       Past Surgical History:     Past Surgical History:   Procedure Laterality Date    HYSTERECTOMY      endometriosis    HYSTERECTOMY, TOTAL ABDOMINAL      one ovary remains    LUMBAR FUSION Left 11/10/2017    LEFT L4-L5, L5-S1 TRANSFORAMINAL LUMBAR INTERBODY FUSION WITH with CT navigation    SALPINGO-OOPHORECTOMY      unilat - rt - WITH HYSTERECTOMY    TUBAL LIGATION       Current Medications:   Prior to Admission medications    Medication Sig Start Date End Date Taking? Authorizing Provider   gabapentin (NEURONTIN) 300 MG capsule Take 1 capsule by mouth 3 times daily for 180 days.  Intended supply: 30 days 19  Braden Soni MD   meloxicam MEHRAN ENCISO [facility]) 15 MG tablet Take 1 tablet by mouth daily 19   Braden Soni MD   lansoprazole (PREVACID) 30 MG delayed release capsule Take 1 capsule by mouth daily 19   Braden Soni MD   fexofenadine TY [facility]) 180 MG tablet Take 1 tablet by mouth daily Stop zyrtec 19   Braden Soni MD   calcium carbonate-vitamin D (CALCIUM 600+D) 600-200 MG-UNIT TABS Take 1 each by mouth 2 times daily 19   Braden Soni MD   EST ESTROGENS-METHYLTEST DS 1.25-2.5 MG TABS TAKE 1 TABLET BY MOUTH ONCE DAILY 4/2/19   Yusuf Lewis MD   azelastine (ASTELIN) 0.1 % nasal spray 1 spray by Nasal route 2 times daily Use in each nostril as directed 10/22/18   Yusuf Lewis MD   traZODone (DESYREL) 100 MG tablet Take 1 tablet by mouth nightly 10/22/18   Yusuf Lewis MD   Cholecalciferol (VITAMIN D) 2000 units CAPS capsule Take 1 capsule by mouth daily 6/29/18   Yusuf Lewis MD   albuterol sulfate HFA (VENTOLIN HFA) 108 (90 Base) MCG/ACT inhaler Inhale 2 puffs into the lungs every 6 hours as needed for Wheezing 10/24/17   Yusuf Lewis MD     Allergies:  Fruit acid concentrate; Penicillins; Pollen extract; Tomato; Bee pollen; Fruit extracts; Influenza virus vacc split pf; and Influenza vaccines  Social History:    reports that she has never smoked. She has never used smokeless tobacco. She reports that she drinks alcohol. She reports that she does not use drugs. Family History:   Family History   Problem Relation Age of Onset    Heart Disease Mother     High Blood Pressure Mother     Diabetes Mother     Heart Disease Father     Stroke Father     High Blood Pressure Father     Heart Disease Paternal Grandmother        Vitals: Blood pressure 102/65, pulse 52, temperature 98.6 °F (37 °C), temperature source Temporal, resp. rate 16, height 5' 6\" (1.676 m), weight 153 lb (69.4 kg), last menstrual period 01/13/1999, SpO2 100 %, not currently breastfeeding. PHYSICAL EXAM:including affected areas  HENT: Airway patent and reviewed  Cardiovascular: Normal rate, regular rhythm, normal heart sounds. Pulmonary/Chest: No wheezes. No rhonchi. No rales. Abdominal: Soft. Bowel sounds are normal. No distension.   Extremities: Moves all extremities equally  Cervical and Lumbar Spine: Painful range of motion, no midline tenderness       Diagnosis:Lumbar radiculopathy  M51.26   M43.16   M54.16   R22.2    Plan: Proceed with planned procedure      ASA CLASS:

## 2019-08-06 DIAGNOSIS — R68.82 LOW LIBIDO: ICD-10-CM

## 2019-08-14 ENCOUNTER — OFFICE VISIT (OUTPATIENT)
Dept: PRIMARY CARE CLINIC | Age: 54
End: 2019-08-14
Payer: COMMERCIAL

## 2019-08-14 VITALS
HEART RATE: 58 BPM | WEIGHT: 158.4 LBS | BODY MASS INDEX: 25.46 KG/M2 | HEIGHT: 66 IN | DIASTOLIC BLOOD PRESSURE: 72 MMHG | OXYGEN SATURATION: 100 % | RESPIRATION RATE: 12 BRPM | SYSTOLIC BLOOD PRESSURE: 122 MMHG

## 2019-08-14 DIAGNOSIS — L80 VITILIGO: ICD-10-CM

## 2019-08-14 DIAGNOSIS — R63.5 WEIGHT GAIN: ICD-10-CM

## 2019-08-14 DIAGNOSIS — E55.9 VITAMIN D DEFICIENCY: ICD-10-CM

## 2019-08-14 DIAGNOSIS — R68.82 LOW LIBIDO: ICD-10-CM

## 2019-08-14 DIAGNOSIS — K21.00 GASTROESOPHAGEAL REFLUX DISEASE WITH ESOPHAGITIS: ICD-10-CM

## 2019-08-14 DIAGNOSIS — B35.1 NAIL FUNGUS: Primary | ICD-10-CM

## 2019-08-14 DIAGNOSIS — M25.551 RIGHT HIP PAIN: ICD-10-CM

## 2019-08-14 PROCEDURE — 99214 OFFICE O/P EST MOD 30 MIN: CPT | Performed by: INTERNAL MEDICINE

## 2019-08-14 RX ORDER — BUPROPION HYDROCHLORIDE 150 MG/1
150 TABLET ORAL EVERY MORNING
Qty: 30 TABLET | Refills: 3 | Status: SHIPPED | OUTPATIENT
Start: 2019-08-14 | End: 2019-09-25

## 2019-08-14 RX ORDER — ESTERIFIED ESTROGENS AND METHYLTESTOSTERONE 1.25; 2.5 MG/1; MG/1
TABLET, FILM COATED ORAL
Qty: 30 TABLET | Refills: 3 | Status: SHIPPED | OUTPATIENT
Start: 2019-08-14 | End: 2019-08-14

## 2019-08-14 RX ORDER — MULTIVIT-MIN/IRON/FOLIC ACID/K 18-600-40
1 CAPSULE ORAL DAILY
Qty: 30 CAPSULE | Refills: 1 | Status: ON HOLD | OUTPATIENT
Start: 2019-08-14 | End: 2019-09-13 | Stop reason: SDUPTHER

## 2019-08-14 RX ORDER — LANSOPRAZOLE 30 MG/1
30 CAPSULE, DELAYED RELEASE ORAL DAILY
Qty: 30 CAPSULE | Refills: 3 | Status: SHIPPED | OUTPATIENT
Start: 2019-08-14 | End: 2019-08-14

## 2019-08-14 RX ORDER — TERBINAFINE HYDROCHLORIDE 250 MG/1
250 TABLET ORAL DAILY
Qty: 42 TABLET | Refills: 1 | Status: SHIPPED | OUTPATIENT
Start: 2019-08-14 | End: 2019-09-25

## 2019-08-14 RX ORDER — TOPIRAMATE 50 MG/1
50 TABLET, FILM COATED ORAL NIGHTLY
Qty: 30 TABLET | Refills: 5 | Status: SHIPPED | OUTPATIENT
Start: 2019-08-14 | End: 2020-02-05

## 2019-08-14 ASSESSMENT — ENCOUNTER SYMPTOMS
ABDOMINAL PAIN: 0
EYES NEGATIVE: 1
ANAL BLEEDING: 0
SINUS PRESSURE: 0
BLOOD IN STOOL: 0
COUGH: 0
SORE THROAT: 0
NAUSEA: 0
ALLERGIC/IMMUNOLOGIC NEGATIVE: 1
RECTAL PAIN: 0
ABDOMINAL DISTENTION: 0
CONSTIPATION: 0
VOMITING: 0
SHORTNESS OF BREATH: 0

## 2019-08-14 NOTE — PROGRESS NOTES
2019     Patricia Daniel (:  1965) is a 47 y.o. female, here for evaluation of the following medical concerns:    HPI     New vitaligo spot in  Neck with two hypopigmented area that started one month ago with no  Itching or pain or fever and no other areas of body. Finger nail thickening and graying discoloration over the past month. Uses polish and artificial nails. Has appearance of nail fungus. DJD of lumbar spine with 60  % improvement in the low back pain and no more sciatica of right leg after epidural with DR. Norris. Gabapentin helps but causing weight gain. Patient tapered gabapentin to 300 mg nightly . Will discontinue now due to weight gain. Topamax 50 mg bid was causing mild memory problems prior to starting gabapentin. Will decrease topamaz to 50 mg nightly. New right hip pain with weight bearing only without history of fall or injury. Patient Active Problem List   Diagnosis    Allergic rhinitis    Vitamin D deficiency    Insomnia    Hyperlipidemia    Neck pain    Constipation    Lumbar stenosis     Allergies   Allergen Reactions    Fruit Acid Concentrate      Tomato, strawberries    Penicillins Hives    Pollen Extract     Tomato Anaphylaxis     Vomiting  Vomiting    Bee Pollen     Fruit Extracts     Influenza Vac Split Quad     Influenza Vaccines Rash     Hives with vaccine. Review of Systems   Constitutional: Negative for chills, diaphoresis and fatigue. HENT: Positive for congestion. Negative for ear pain, postnasal drip, sinus pressure and sore throat. Eyes: Negative. Respiratory: Negative for cough and shortness of breath. Cardiovascular: Negative. Gastrointestinal: Negative for abdominal distention, abdominal pain, anal bleeding, blood in stool, constipation, nausea, rectal pain and vomiting. Endocrine: Negative. Genitourinary: Negative. Musculoskeletal: Negative for neck pain. Right hip pain   Skin: Negative. Finger nail thickening. Allergic/Immunologic: Negative. Neurological: Negative for headaches. Lumbar laminectomy November of 2017 at [facility] with resolution of spinal claudication. Improved with epidural   Hematological: Negative. Psychiatric/Behavioral: Negative. Prior to Visit Medications    Medication Sig Taking? Authorizing Provider   gabapentin (NEURONTIN) 300 MG capsule Take 1 capsule by mouth 3 times daily for 180 days.  Intended supply: 30 days Yes Filiberto Arias MD   meloxicam (MOBIC) 15 MG tablet Take 1 tablet by mouth daily Yes Filiberto Arias MD   lansoprazole (PREVACID) 30 MG delayed release capsule Take 1 capsule by mouth daily Yes Filiberto Arias MD   fexofenadine (ALLEGRA) 180 MG tablet Take 1 tablet by mouth daily Stop zyrtec Yes Filiberto Arias MD   calcium carbonate-vitamin D (CALCIUM 600+D) 600-200 MG-UNIT TABS Take 1 each by mouth 2 times daily Yes Filiberto Arias MD   EST ESTROGENS-METHYLTEST DS 1.25-2.5 MG TABS TAKE 1 TABLET BY MOUTH ONCE DAILY Yes Filiberto Arias MD   azelastine (ASTELIN) 0.1 % nasal spray 1 spray by Nasal route 2 times daily Use in each nostril as directed Yes Filiberto Arias MD   traZODone (DESYREL) 100 MG tablet Take 1 tablet by mouth nightly Yes Filiberto Arias MD   Cholecalciferol (VITAMIN D) 2000 units CAPS capsule Take 1 capsule by mouth daily Yes Filiberto Arias MD   albuterol sulfate HFA (VENTOLIN HFA) 108 (90 Base) MCG/ACT inhaler Inhale 2 puffs into the lungs every 6 hours as needed for Wheezing Yes Filiberto Arias MD        Social History     Tobacco Use    Smoking status: Never Smoker    Smokeless tobacco: Never Used   Substance Use Topics    Alcohol use: Yes     Comment: 3-4 times a year        Vitals:    08/14/19 1434   BP: 122/72   Pulse: 58   Resp: 12   SpO2: 100%   Weight: 158 lb 6.4 oz (71.8 kg)   Height: 5' 6\" (1.676 m)     Estimated body mass index is 25.57 kg/m² as calculated from the following:    Height as of this encounter: 5' 6\" (1.676 m). Weight as of this encounter: 158 lb 6.4 oz (71.8 kg). Physical Exam   Constitutional: She is oriented to person, place, and time. She appears well-developed and well-nourished. No distress. HENT:   Head: Normocephalic and atraumatic. Right Ear: External ear normal.   Left Ear: External ear normal.   Nose: Nose normal.   Mouth/Throat: Oropharynx is clear and moist.   Eyes: Pupils are equal, round, and reactive to light. Conjunctivae and EOM are normal. Right eye exhibits no discharge. Left eye exhibits no discharge. No scleral icterus. Neck: Normal range of motion. Neck supple. No JVD present. No tracheal deviation present. No thyromegaly present. Cardiovascular: Normal rate, normal heart sounds and intact distal pulses. Exam reveals no gallop. No murmur heard. Pulmonary/Chest: Effort normal and breath sounds normal. No respiratory distress. She has no wheezes. She has no rales. She exhibits no tenderness. Abdominal: Soft. Bowel sounds are normal. She exhibits no distension and no mass. There is no tenderness. There is no rebound and no guarding. Musculoskeletal: She exhibits no edema, tenderness or deformity. Lymphadenopathy:     She has no cervical adenopathy. Neurological: She is alert and oriented to person, place, and time. She displays normal reflexes. No cranial nerve deficit. She exhibits normal muscle tone. Coordination normal.   Decreased knee jerks , kurtis biceps and triceps reflexes. Pain with external outer rotation of right hip.  ambulating well. Reduced spasm in lower back. Reduced range of motion of lumbar spine. Skin: Skin is warm and dry. No rash noted. She is not diaphoretic. No erythema. No pallor. Psychiatric: She has a normal mood and affect. Her behavior is normal. Judgment and thought content normal.       ASSESSMENT/PLAN:   Diagnosis Orders   1.  Nail fungus of

## 2019-08-15 ENCOUNTER — TELEPHONE (OUTPATIENT)
Dept: PAIN MANAGEMENT | Age: 54
End: 2019-08-15

## 2019-08-19 DIAGNOSIS — K59.01 SLOW TRANSIT CONSTIPATION: ICD-10-CM

## 2019-08-19 RX ORDER — POLYETHYLENE GLYCOL 3350 17 G/17G
17 POWDER, FOR SOLUTION ORAL DAILY
Qty: 500 G | Refills: 11 | Status: SHIPPED | OUTPATIENT
Start: 2019-08-19 | End: 2019-09-18

## 2019-08-23 ENCOUNTER — OFFICE VISIT (OUTPATIENT)
Dept: ORTHOPEDIC SURGERY | Age: 54
End: 2019-08-23
Payer: COMMERCIAL

## 2019-08-23 VITALS — DIASTOLIC BLOOD PRESSURE: 70 MMHG | SYSTOLIC BLOOD PRESSURE: 100 MMHG | HEART RATE: 73 BPM

## 2019-08-23 DIAGNOSIS — M54.16 LUMBAR RADICULOPATHY: ICD-10-CM

## 2019-08-23 DIAGNOSIS — M51.26 HNP (HERNIATED NUCLEUS PULPOSUS), LUMBAR: Primary | ICD-10-CM

## 2019-08-23 DIAGNOSIS — M43.16 SPONDYLOLISTHESIS OF LUMBAR REGION: ICD-10-CM

## 2019-08-23 PROCEDURE — 99214 OFFICE O/P EST MOD 30 MIN: CPT | Performed by: PHYSICIAN ASSISTANT

## 2019-08-23 RX ORDER — MELOXICAM 15 MG/1
15 TABLET ORAL DAILY
Qty: 30 TABLET | Refills: 0 | Status: SHIPPED | OUTPATIENT
Start: 2019-08-23 | End: 2019-08-23 | Stop reason: CLARIF

## 2019-08-23 NOTE — PROGRESS NOTES
are no rashes, ulcerations or lesions. Strength and tone are normal. No atrophy or abnormal movements are noted. · LEFT UPPER EXTREMITY: Inspection/examination of the left upper extremity does not show any tenderness, deformity or injury. Range of motion is unremarkable and pain-free. There is no gross instability. There are no rashes, ulcerations or lesions. Strength and tone are normal. No atrophy or abnormal movements are noted. LUMBAR/SACRAL EXAMINATION:  · Inspection: Local inspection shows no step-off or bruising. Lumbar alignment is normal. No instability is noted. · Palpation:   No evidence of tenderness at the midline. Lumbar paraspinal tenderness: Mild L4/5 and L5/S1 tenderness  Bursal tenderness No tenderness bilaterally  There is no paraspinal spasm. · Range of Motion: limited by 50% in all planes due to pain  · Strength:   Strength testing is 5/5 in all muscle groups tested. · Special Tests:   Straight leg raise and crossed SLR negative. Rik's testing is negative bilaterally. FADIR's testing is negative bilaterally. · Skin: There are no rashes, ulcerations or lesions. · Reflexes: Reflexes are symmetrically 2+ at the patellar and ankle tendons. Clonus absent bilaterally at the feet. · Gait & station: normal, patient ambulates without assistance and no ataxia  · Additional Examinations:  · RIGHT LOWER EXTREMITY: Inspection/examination of the right lower extremity does not show any tenderness, deformity or injury. Range of motion is normal and pain-free. There is no gross instability. There are no rashes, ulcerations or lesions. Strength and tone are normal. No atrophy or abnormal movements are noted. · LEFT LOWER EXTREMITY:  Inspection/examination of the left lower extremity does not show any tenderness, deformity or injury. Range of motion is normal and pain-free. There is no gross instability. There are no rashes, ulcerations or lesions.   Strength and tone are normal. No atrophy or

## 2019-09-12 ENCOUNTER — TELEPHONE (OUTPATIENT)
Dept: ORTHOPEDIC SURGERY | Age: 54
End: 2019-09-12

## 2019-09-12 NOTE — TELEPHONE ENCOUNTER
DOS   09/18/2019  CPT   60518  OP SX AUTH  NPR     LEVELS   L3  L4     PROCEDURE   INTERALMINAR BLADIMIR    [address]:   [address]

## 2019-09-13 ENCOUNTER — HOSPITAL ENCOUNTER (OUTPATIENT)
Age: 54
Setting detail: OUTPATIENT SURGERY
Discharge: HOME | End: 2019-09-13
Attending: INTERNAL MEDICINE | Admitting: INTERNAL MEDICINE
Payer: COMMERCIAL

## 2019-09-13 VITALS
TEMPERATURE: 97.6 F | RESPIRATION RATE: 16 BRPM | BODY MASS INDEX: 24.75 KG/M2 | DIASTOLIC BLOOD PRESSURE: 68 MMHG | HEIGHT: 66 IN | WEIGHT: 154 LBS | OXYGEN SATURATION: 97 % | HEART RATE: 69 BPM | SYSTOLIC BLOOD PRESSURE: 101 MMHG

## 2019-09-13 PROCEDURE — 87205 SMEAR GRAM STAIN: CPT

## 2019-09-13 PROCEDURE — 3609010300 HC COLONOSCOPY W/BIOPSY SINGLE/MULTIPLE: Performed by: INTERNAL MEDICINE

## 2019-09-13 PROCEDURE — 6360000002 HC RX W HCPCS: Performed by: INTERNAL MEDICINE

## 2019-09-13 PROCEDURE — 3609012400 HC EGD TRANSORAL BIOPSY SINGLE/MULTIPLE: Performed by: INTERNAL MEDICINE

## 2019-09-13 PROCEDURE — 7100000011 HC PHASE II RECOVERY - ADDTL 15 MIN: Performed by: INTERNAL MEDICINE

## 2019-09-13 PROCEDURE — 87106 FUNGI IDENTIFICATION YEAST: CPT

## 2019-09-13 PROCEDURE — 88342 IMHCHEM/IMCYTCHM 1ST ANTB: CPT

## 2019-09-13 PROCEDURE — 2709999900 HC NON-CHARGEABLE SUPPLY: Performed by: INTERNAL MEDICINE

## 2019-09-13 PROCEDURE — 87102 FUNGUS ISOLATION CULTURE: CPT

## 2019-09-13 PROCEDURE — 7100000010 HC PHASE II RECOVERY - FIRST 15 MIN: Performed by: INTERNAL MEDICINE

## 2019-09-13 PROCEDURE — 99153 MOD SED SAME PHYS/QHP EA: CPT | Performed by: INTERNAL MEDICINE

## 2019-09-13 PROCEDURE — 99152 MOD SED SAME PHYS/QHP 5/>YRS: CPT | Performed by: INTERNAL MEDICINE

## 2019-09-13 PROCEDURE — 2500000003 HC RX 250 WO HCPCS: Performed by: INTERNAL MEDICINE

## 2019-09-13 PROCEDURE — 88305 TISSUE EXAM BY PATHOLOGIST: CPT

## 2019-09-13 RX ORDER — ATROPINE SULFATE 0.1 MG/ML
INJECTION INTRAVENOUS PRN
Status: DISCONTINUED | OUTPATIENT
Start: 2019-09-13 | End: 2019-09-13 | Stop reason: ALTCHOICE

## 2019-09-13 RX ORDER — MIDAZOLAM HYDROCHLORIDE 1 MG/ML
INJECTION INTRAMUSCULAR; INTRAVENOUS PRN
Status: DISCONTINUED | OUTPATIENT
Start: 2019-09-13 | End: 2019-09-13 | Stop reason: ALTCHOICE

## 2019-09-13 RX ORDER — PANTOPRAZOLE SODIUM 40 MG/1
40 TABLET, DELAYED RELEASE ORAL
Qty: 60 TABLET | Refills: 1 | Status: SHIPPED | OUTPATIENT
Start: 2019-09-13 | End: 2020-06-22

## 2019-09-13 RX ORDER — MEPERIDINE HYDROCHLORIDE 50 MG/ML
INJECTION INTRAMUSCULAR; INTRAVENOUS; SUBCUTANEOUS PRN
Status: DISCONTINUED | OUTPATIENT
Start: 2019-09-13 | End: 2019-09-13 | Stop reason: ALTCHOICE

## 2019-09-13 ASSESSMENT — PAIN - FUNCTIONAL ASSESSMENT
PAIN_FUNCTIONAL_ASSESSMENT: FACES
PAIN_FUNCTIONAL_ASSESSMENT: 0-10

## 2019-09-13 ASSESSMENT — PAIN SCALES - GENERAL
PAINLEVEL_OUTOF10: 0
PAINLEVEL_OUTOF10: 0

## 2019-09-14 NOTE — OP NOTE
[address] [address], [address]                                OPERATIVE REPORT    PATIENT NAME: Urban Lance                         :        1965  MED REC NO:   [id number]                          ROOM:  ACCOUNT NO:   [de-identified]                           ADMIT DATE: 2019  PROVIDER:     Selena Peng MD    DATE OF PROCEDURE:  2019    SURGEON:  Selena Peng MD    INDICATIONS FOR THE PROCEDURE:  1. Rectal bleed. 2.  Recurrent heartburn. Rule out Ford's esophagus. PROCEDURES:    EGD:  With the patient in the left lateral position and after sedation  with 50 mg of Demerol and 6 mg of Versed IV, the Olympus video endoscope  was introduced into the esophagus and advanced towards the GE junction. The esophagus was normal.  Biopsies from GE junction were obtained to rule out Ford's  metaplasia. Stomach revealed mild antral gastritis and biopsies were  obtained for Helicobacter pylori. The duodenum was normal.  Scope was  then removed without complication. COLONOSCOPY:  The Olympus video colonoscope was then inserted into the  rectum and carefully advanced to the cecum and an extremely long and  redundant colon. Small polyp noted in the rectum, which we removed with  the biopsy forceps technique. Careful inspection revealed no other  abnormality. The procedure was terminated without complication. IMPRESSION:  1. Mild gastritis. 2.  Rectal polyp.     EBL none    Gloria Mann MD    D: 2019 15:09:36       T: 2019 23:45:27     [email or web address]_[email or web address]  Job#: [id number]     Doc#: [id number]    CC:

## 2019-09-18 ENCOUNTER — TELEPHONE (OUTPATIENT)
Dept: ORTHOPEDIC SURGERY | Age: 54
End: 2019-09-18

## 2019-09-18 ENCOUNTER — APPOINTMENT (OUTPATIENT)
Dept: GENERAL RADIOLOGY | Age: 54
End: 2019-09-18
Attending: PHYSICAL MEDICINE & REHABILITATION
Payer: COMMERCIAL

## 2019-09-18 ENCOUNTER — HOSPITAL ENCOUNTER (OUTPATIENT)
Age: 54
Setting detail: OUTPATIENT SURGERY
Discharge: HOME | End: 2019-09-18
Attending: PHYSICAL MEDICINE & REHABILITATION | Admitting: PHYSICAL MEDICINE & REHABILITATION
Payer: COMMERCIAL

## 2019-09-18 VITALS
TEMPERATURE: 97.9 F | HEIGHT: 66 IN | SYSTOLIC BLOOD PRESSURE: 122 MMHG | BODY MASS INDEX: 24.91 KG/M2 | OXYGEN SATURATION: 100 % | RESPIRATION RATE: 16 BRPM | WEIGHT: 155 LBS | HEART RATE: 58 BPM | DIASTOLIC BLOOD PRESSURE: 78 MMHG

## 2019-09-18 PROCEDURE — 3610000054 HC PAIN LEVEL 3 BASE (NON-OR): Performed by: PHYSICAL MEDICINE & REHABILITATION

## 2019-09-18 PROCEDURE — 99152 MOD SED SAME PHYS/QHP 5/>YRS: CPT | Performed by: PHYSICAL MEDICINE & REHABILITATION

## 2019-09-18 PROCEDURE — 2500000003 HC RX 250 WO HCPCS: Performed by: PHYSICAL MEDICINE & REHABILITATION

## 2019-09-18 PROCEDURE — 2709999900 HC NON-CHARGEABLE SUPPLY: Performed by: PHYSICAL MEDICINE & REHABILITATION

## 2019-09-18 PROCEDURE — 2580000003 HC RX 258: Performed by: PHYSICAL MEDICINE & REHABILITATION

## 2019-09-18 PROCEDURE — 6360000002 HC RX W HCPCS: Performed by: PHYSICAL MEDICINE & REHABILITATION

## 2019-09-18 PROCEDURE — 77003 FLUOROGUIDE FOR SPINE INJECT: CPT

## 2019-09-18 RX ORDER — FLUMAZENIL 0.1 MG/ML
0.2 INJECTION, SOLUTION INTRAVENOUS ONCE
Status: COMPLETED | OUTPATIENT
Start: 2019-09-18 | End: 2019-09-18

## 2019-09-18 RX ORDER — MIDAZOLAM HYDROCHLORIDE 1 MG/ML
INJECTION INTRAMUSCULAR; INTRAVENOUS
Status: COMPLETED | OUTPATIENT
Start: 2019-09-18 | End: 2019-09-18

## 2019-09-18 RX ORDER — LIDOCAINE HYDROCHLORIDE 10 MG/ML
INJECTION, SOLUTION INFILTRATION; PERINEURAL
Status: COMPLETED | OUTPATIENT
Start: 2019-09-18 | End: 2019-09-18

## 2019-09-18 RX ORDER — 0.9 % SODIUM CHLORIDE 0.9 %
VIAL (ML) INJECTION
Status: COMPLETED | OUTPATIENT
Start: 2019-09-18 | End: 2019-09-18

## 2019-09-18 RX ORDER — METHYLPREDNISOLONE ACETATE 80 MG/ML
INJECTION, SUSPENSION INTRA-ARTICULAR; INTRALESIONAL; INTRAMUSCULAR; SOFT TISSUE
Status: COMPLETED | OUTPATIENT
Start: 2019-09-18 | End: 2019-09-18

## 2019-09-18 RX ADMIN — FLUMAZENIL 0.2 MG: 0.1 INJECTION INTRAVENOUS at 07:30

## 2019-09-18 ASSESSMENT — PAIN DESCRIPTION - ORIENTATION: ORIENTATION: RIGHT;LOWER

## 2019-09-18 ASSESSMENT — PAIN DESCRIPTION - DESCRIPTORS
DESCRIPTORS: ACHING;PRESSURE
DESCRIPTORS: ACHING;PRESSURE

## 2019-09-18 ASSESSMENT — PAIN DESCRIPTION - FREQUENCY: FREQUENCY: CONTINUOUS

## 2019-09-18 ASSESSMENT — PAIN SCALES - GENERAL: PAINLEVEL_OUTOF10: 6

## 2019-09-18 ASSESSMENT — PAIN DESCRIPTION - PAIN TYPE: TYPE: CHRONIC PAIN

## 2019-09-18 ASSESSMENT — PAIN - FUNCTIONAL ASSESSMENT: PAIN_FUNCTIONAL_ASSESSMENT: 0-10

## 2019-09-18 ASSESSMENT — PAIN DESCRIPTION - LOCATION: LOCATION: BACK

## 2019-09-18 NOTE — H&P
Cholecalciferol (VITAMIN D) 2000 units CAPS capsule Take 1 capsule by mouth daily 8/14/19  Yes Bruce Vogel MD   EST ESTROGENS-METHYLTEST DS 1.25-2.5 MG TABS Take 1 each by mouth daily. 8/14/19  Yes Bruce Vogel MD   terbinafine (LAMISIL) 250 MG tablet Take 1 tablet by mouth daily 8/14/19 9/25/19 Yes Bruce Vogel MD   buPROPion (WELLBUTRIN XL) 150 MG extended release tablet Take 1 tablet by mouth every morning 8/14/19  Yes Bruce Vogel MD   topiramate (TOPAMAX) 50 MG tablet Take 1 tablet by mouth nightly 8/14/19  Yes Bruce Vogel MD   fexofenadine (ALLEGRA) 180 MG tablet Take 1 tablet by mouth daily Stop zyrtec 5/1/19  Yes Bruce Vogel MD   calcium carbonate-vitamin D (CALCIUM 600+D) 600-200 MG-UNIT TABS Take 1 each by mouth 2 times daily 4/27/19  Yes Bruce Vogel MD   azelastine (ASTELIN) 0.1 % nasal spray 1 spray by Nasal route 2 times daily Use in each nostril as directed 10/22/18   Bruce Vogel MD   albuterol sulfate HFA (VENTOLIN HFA) 108 (90 Base) MCG/ACT inhaler Inhale 2 puffs into the lungs every 6 hours as needed for Wheezing 10/24/17   Bruce Vogel MD     Allergies:  Fruit acid concentrate; Penicillins; Pollen extract; Tomato; Bee pollen; Fruit extracts; Influenza vac split quad; and Influenza vaccines  Social History:    reports that she has never smoked. She has never used smokeless tobacco. She reports that she drinks alcohol. She reports that she does not use drugs. Family History:   Family History   Problem Relation Age of Onset    Heart Disease Mother     High Blood Pressure Mother     Diabetes Mother     Heart Disease Father     Stroke Father     High Blood Pressure Father     Heart Disease Paternal Grandmother        Vitals: Blood pressure 119/77, pulse 61, temperature 97.9 °F (36.6 °C), temperature source Temporal, resp.  rate 16, height 5' 6\" (1.676 m), weight 155 lb (70.3 kg), last menstrual period

## 2019-09-18 NOTE — TELEPHONE ENCOUNTER
LAUREL GARZA CALLED FROM THE [facility] [employer] RE: NEELAM MONTERO. INQUIRY NUMBER IS: [id number].  CALL BACK NUMBER FOR LAUREL: [phone]

## 2019-09-18 NOTE — OP NOTE
Patient:  Charlsie Opitz  YOB: 1965  Medical Record #:  [id number]   Place:   [address] [place] [address]  Date:  9/18/2019   Physician:  Ana Sal MD, MALINA    Procedure:  Lumbar Epidural Steroid Injection - Interlaminar approach  L3 - L4    Pre-Procedure Diagnosis: Lumbar radiculopathy    Post-Procedure Diagnosis: Same    Sedation: Local with 1% Lidocaine 3 ml and 2 mg of IV Versed    EBL: None    Complications: None    Procedure Summary:    The patient was brought to the procedure suite and placed in the prone position. The skin overlying the lumbar spine was prepped and draped in the usual sterile fashion. Using fluoroscopic guidance, the L3 - L4 interlaminar space was identified. Through anesthetized skin a 22 gauge Touhy needle was advanced into the epidural space using continuous loss of resistance to saline technique. Isovue M300 was instilled and an epidurogram was noted without evidence of intrathecal or vascular spread. 5 ml of a solution containing preservative free normal saline and 15 mg of Betamethasone was instilled. The needle was removed and a band-aid applied. The patient was transferred to the post-operative area in stable condition.

## 2019-09-18 NOTE — PROGRESS NOTES
IV discontinued, catheter intact, and dressing applied. Procedural dressing dry and intact. Bilateral lower extremities equal in strength. Per Dr. Elizabeth silva to discharge patient; patient discharged ambulatory to home. Discharge instructions reviewed with patient or responsible adult, signed and copy given. All home medications have been reviewed. All questions answered and patient or responsible adult verbalized understanding.   PAIN LEVEL AT DISCHARGE __0___

## 2019-09-25 ENCOUNTER — OFFICE VISIT (OUTPATIENT)
Dept: PRIMARY CARE CLINIC | Age: 54
End: 2019-09-25
Payer: COMMERCIAL

## 2019-09-25 VITALS
HEART RATE: 76 BPM | SYSTOLIC BLOOD PRESSURE: 99 MMHG | DIASTOLIC BLOOD PRESSURE: 62 MMHG | TEMPERATURE: 97.4 F | HEIGHT: 65 IN | BODY MASS INDEX: 26.66 KG/M2 | OXYGEN SATURATION: 99 % | WEIGHT: 160 LBS | RESPIRATION RATE: 18 BRPM

## 2019-09-25 DIAGNOSIS — R23.2 HOT FLASHES: ICD-10-CM

## 2019-09-25 DIAGNOSIS — R35.0 URINARY FREQUENCY: ICD-10-CM

## 2019-09-25 DIAGNOSIS — R63.5 WEIGHT GAIN: ICD-10-CM

## 2019-09-25 DIAGNOSIS — R23.2 HOT FLASHES: Primary | ICD-10-CM

## 2019-09-25 LAB
ALBUMIN SERPL-MCNC: 5 G/DL (ref 3.4–5)
ANION GAP SERPL CALCULATED.3IONS-SCNC: 13 MMOL/L (ref 3–16)
BASOPHILS ABSOLUTE: 0 K/UL (ref 0–0.2)
BASOPHILS RELATIVE PERCENT: 0.5 %
BILIRUBIN URINE: NEGATIVE
BLOOD, URINE: NEGATIVE
BUN BLDV-MCNC: 16 MG/DL (ref 7–20)
CALCIUM SERPL-MCNC: 9.3 MG/DL (ref 8.3–10.6)
CHLORIDE BLD-SCNC: 106 MMOL/L (ref 99–110)
CLARITY: CLEAR
CO2: 24 MMOL/L (ref 21–32)
COLOR: YELLOW
CREAT SERPL-MCNC: 0.7 MG/DL (ref 0.6–1.1)
EOSINOPHILS ABSOLUTE: 0.1 K/UL (ref 0–0.6)
EOSINOPHILS RELATIVE PERCENT: 2 %
GFR AFRICAN AMERICAN: >60
GFR NON-AFRICAN AMERICAN: >60
GLUCOSE BLD-MCNC: 93 MG/DL (ref 70–99)
GLUCOSE URINE: NEGATIVE MG/DL
HCT VFR BLD CALC: 43.2 % (ref 36–48)
HEMOGLOBIN: 14.4 G/DL (ref 12–16)
KETONES, URINE: NEGATIVE MG/DL
LEUKOCYTE ESTERASE, URINE: NEGATIVE
LYMPHOCYTES ABSOLUTE: 1.5 K/UL (ref 1–5.1)
LYMPHOCYTES RELATIVE PERCENT: 39.3 %
MCH RBC QN AUTO: 31.4 PG (ref 26–34)
MCHC RBC AUTO-ENTMCNC: 33.3 G/DL (ref 31–36)
MCV RBC AUTO: 94.1 FL (ref 80–100)
MICROSCOPIC EXAMINATION: NORMAL
MONOCYTES ABSOLUTE: 0.3 K/UL (ref 0–1.3)
MONOCYTES RELATIVE PERCENT: 7.3 %
NEUTROPHILS ABSOLUTE: 2 K/UL (ref 1.7–7.7)
NEUTROPHILS RELATIVE PERCENT: 50.9 %
NITRITE, URINE: NEGATIVE
PDW BLD-RTO: 13.2 % (ref 12.4–15.4)
PH UA: 7 (ref 5–8)
PHOSPHORUS: 2.6 MG/DL (ref 2.5–4.9)
PLATELET # BLD: 263 K/UL (ref 135–450)
PMV BLD AUTO: 7.9 FL (ref 5–10.5)
POTASSIUM SERPL-SCNC: 4.1 MMOL/L (ref 3.5–5.1)
PROTEIN UA: NEGATIVE MG/DL
RBC # BLD: 4.59 M/UL (ref 4–5.2)
SODIUM BLD-SCNC: 143 MMOL/L (ref 136–145)
SPECIFIC GRAVITY UA: 1.01 (ref 1–1.03)
TSH REFLEX FT4: 2.56 UIU/ML (ref 0.27–4.2)
URINE TYPE: NORMAL
UROBILINOGEN, URINE: 0.2 E.U./DL
WBC # BLD: 3.9 K/UL (ref 4–11)

## 2019-09-25 PROCEDURE — 99214 OFFICE O/P EST MOD 30 MIN: CPT | Performed by: INTERNAL MEDICINE

## 2019-09-25 RX ORDER — PHENTERMINE HYDROCHLORIDE 37.5 MG/1
37.5 CAPSULE ORAL EVERY MORNING
Qty: 30 CAPSULE | Refills: 0 | Status: SHIPPED | OUTPATIENT
Start: 2019-09-25 | End: 2019-10-21 | Stop reason: SDUPTHER

## 2019-09-25 ASSESSMENT — ENCOUNTER SYMPTOMS
VOMITING: 0
ABDOMINAL PAIN: 0
ABDOMINAL DISTENTION: 0
CHANGE IN BOWEL HABIT: 0
ALLERGIC/IMMUNOLOGIC NEGATIVE: 1
CONSTIPATION: 0
SORE THROAT: 0
NAUSEA: 0
BLOOD IN STOOL: 0
BOWEL INCONTINENCE: 0
SINUS PRESSURE: 0
BACK PAIN: 1
ANAL BLEEDING: 0
COUGH: 0
RECTAL PAIN: 0
SHORTNESS OF BREATH: 0
EYES NEGATIVE: 1

## 2019-09-25 NOTE — PROGRESS NOTES
2019     Lucita Faustin (:  1965) is a 47 y.o. female, here for evaluation of the following medical concerns:    Back Pain   This is a chronic problem. The problem occurs constantly. The problem has been gradually improving since onset. The pain is present in the lumbar spine. The quality of the pain is described as aching. Radiates to: right buttock and no longer in the right thigh. The pain is at a severity of 3/10. The pain is mild. The symptoms are aggravated by bending and twisting. Associated symptoms include tingling. Pertinent negatives include no abdominal pain, bladder incontinence, bowel incontinence, chest pain, dysuria, fever, headaches, leg pain, weakness or weight loss. Other   This is a new (since unable to exercise weight has increased 20 lbs . goal weight is 140 and up to 160 now. ) problem. Episode onset: weight gain was over the past two months. no longer on gabapentin and not on steroids. had two epidural injections  , but needed and can not ambulate. The problem has been rapidly worsening. Associated symptoms include fatigue. Pertinent negatives include no abdominal pain, change in bowel habit, chest pain, chills, congestion, coughing, diaphoresis, fever, headaches, joint swelling, myalgias, nausea, neck pain, sore throat, vertigo, vomiting or weakness. Nothing aggravates the symptoms. She has tried nothing for the symptoms. The treatment provided no relief. Complaint of  Hot flashes. Taking estratest 1.25 mg /2.5 mg  With dry mouth , vaginal dryness, loss of libido. All of these symptoms had resolved when started estrotest. A few months ago. Maribeth Phillips Has had urinary frequency and burning. Sinuses have been draining but no purulent drainage.    Patient Active Problem List   Diagnosis    Allergic rhinitis    Vitamin D deficiency    Insomnia    Hyperlipidemia    Neck pain    Constipation    Lumbar stenosis     Allergies   Allergen Reactions    Fruit Acid

## 2019-09-26 ASSESSMENT — PATIENT HEALTH QUESTIONNAIRE - PHQ9
1. LITTLE INTEREST OR PLEASURE IN DOING THINGS: 1
2. FEELING DOWN, DEPRESSED OR HOPELESS: 1
SUM OF ALL RESPONSES TO PHQ QUESTIONS 1-9: 2
SUM OF ALL RESPONSES TO PHQ9 QUESTIONS 1 & 2: 2
SUM OF ALL RESPONSES TO PHQ QUESTIONS 1-9: 2

## 2019-09-27 ENCOUNTER — TELEPHONE (OUTPATIENT)
Dept: PRIMARY CARE CLINIC | Age: 54
End: 2019-09-27

## 2019-09-27 LAB — URINE CULTURE, ROUTINE: NORMAL

## 2019-10-02 ENCOUNTER — OFFICE VISIT (OUTPATIENT)
Dept: ENDOCRINOLOGY | Age: 54
End: 2019-10-02

## 2019-10-02 DIAGNOSIS — R63.5 WEIGHT GAIN: Primary | ICD-10-CM

## 2019-10-03 DIAGNOSIS — B37.81 CANDIDA ESOPHAGITIS (HCC): Primary | ICD-10-CM

## 2019-10-03 RX ORDER — FLUCONAZOLE 100 MG/1
TABLET ORAL
Qty: 30 TABLET | Refills: 0 | Status: SHIPPED | OUTPATIENT
Start: 2019-10-03 | End: 2019-12-20 | Stop reason: SDUPTHER

## 2019-10-08 DIAGNOSIS — M25.552 BILATERAL HIP PAIN: Primary | ICD-10-CM

## 2019-10-08 DIAGNOSIS — M25.551 BILATERAL HIP PAIN: Primary | ICD-10-CM

## 2019-10-08 DIAGNOSIS — Z11.4 SCREENING FOR HIV WITHOUT PRESENCE OF RISK FACTORS: Primary | ICD-10-CM

## 2019-10-08 RX ORDER — LIDOCAINE 50 MG/G
1 PATCH TOPICAL DAILY
Qty: 30 PATCH | Refills: 11 | Status: SHIPPED | OUTPATIENT
Start: 2019-10-08 | End: 2019-11-07

## 2019-10-08 RX ORDER — MELOXICAM 15 MG/1
15 TABLET ORAL DAILY
Qty: 30 TABLET | Refills: 0 | Status: CANCELLED | OUTPATIENT
Start: 2019-10-08

## 2019-10-10 RX ORDER — MELOXICAM 15 MG/1
15 TABLET ORAL DAILY
Qty: 30 TABLET | Refills: 0 | Status: SHIPPED | OUTPATIENT
Start: 2019-10-10 | End: 2019-11-20

## 2019-10-11 ENCOUNTER — OFFICE VISIT (OUTPATIENT)
Dept: ORTHOPEDIC SURGERY | Age: 54
End: 2019-10-11
Payer: COMMERCIAL

## 2019-10-11 VITALS
DIASTOLIC BLOOD PRESSURE: 74 MMHG | SYSTOLIC BLOOD PRESSURE: 126 MMHG | HEIGHT: 65 IN | WEIGHT: 160.05 LBS | BODY MASS INDEX: 26.67 KG/M2 | HEART RATE: 88 BPM

## 2019-10-11 DIAGNOSIS — M96.1 LUMBAR POST-LAMINECTOMY SYNDROME: Primary | ICD-10-CM

## 2019-10-11 DIAGNOSIS — M43.16 SPONDYLOLISTHESIS OF LUMBAR REGION: ICD-10-CM

## 2019-10-11 DIAGNOSIS — M54.16 LUMBAR RADICULOPATHY: ICD-10-CM

## 2019-10-11 DIAGNOSIS — M51.26 PROTRUSION OF LUMBAR INTERVERTEBRAL DISC: ICD-10-CM

## 2019-10-11 PROCEDURE — 99214 OFFICE O/P EST MOD 30 MIN: CPT | Performed by: PHYSICAL MEDICINE & REHABILITATION

## 2019-10-14 LAB
FUNGUS (MYCOLOGY) CULTURE: ABNORMAL
FUNGUS STAIN: ABNORMAL
ORGANISM: ABNORMAL

## 2019-10-16 ENCOUNTER — TELEPHONE (OUTPATIENT)
Dept: PRIMARY CARE CLINIC | Age: 54
End: 2019-10-16

## 2019-10-16 ENCOUNTER — HOSPITAL ENCOUNTER (OUTPATIENT)
Age: 54
Discharge: HOME | End: 2019-10-16
Payer: COMMERCIAL

## 2019-10-16 ENCOUNTER — HOSPITAL ENCOUNTER (OUTPATIENT)
Dept: GENERAL RADIOLOGY | Age: 54
Discharge: HOME | End: 2019-10-16
Payer: COMMERCIAL

## 2019-10-16 DIAGNOSIS — M25.551 BILATERAL HIP PAIN: ICD-10-CM

## 2019-10-16 DIAGNOSIS — M25.552 BILATERAL HIP PAIN: ICD-10-CM

## 2019-10-16 PROCEDURE — 73521 X-RAY EXAM HIPS BI 2 VIEWS: CPT

## 2019-10-16 RX ORDER — METHYLPREDNISOLONE 4 MG/1
TABLET ORAL
Qty: 1 KIT | Refills: 0 | Status: SHIPPED | OUTPATIENT
Start: 2019-10-16 | End: 2019-10-22

## 2019-10-21 ENCOUNTER — OFFICE VISIT (OUTPATIENT)
Dept: PRIMARY CARE CLINIC | Age: 54
End: 2019-10-21
Payer: COMMERCIAL

## 2019-10-21 VITALS
SYSTOLIC BLOOD PRESSURE: 128 MMHG | DIASTOLIC BLOOD PRESSURE: 79 MMHG | OXYGEN SATURATION: 98 % | HEART RATE: 91 BPM | TEMPERATURE: 97.6 F | WEIGHT: 161 LBS | BODY MASS INDEX: 26.79 KG/M2

## 2019-10-21 DIAGNOSIS — R63.5 WEIGHT GAIN: ICD-10-CM

## 2019-10-21 DIAGNOSIS — M43.16 SPONDYLOLISTHESIS OF LUMBAR REGION: ICD-10-CM

## 2019-10-21 DIAGNOSIS — M54.16 LUMBAR RADICULOPATHY: Primary | ICD-10-CM

## 2019-10-21 DIAGNOSIS — M51.26 HNP (HERNIATED NUCLEUS PULPOSUS), LUMBAR: ICD-10-CM

## 2019-10-21 PROCEDURE — 99213 OFFICE O/P EST LOW 20 MIN: CPT | Performed by: INTERNAL MEDICINE

## 2019-10-21 RX ORDER — LANSOPRAZOLE 30 MG/1
CAPSULE, DELAYED RELEASE ORAL
Refills: 3 | COMMUNITY
Start: 2019-09-20 | End: 2020-02-28

## 2019-10-21 RX ORDER — PHENTERMINE HYDROCHLORIDE 37.5 MG/1
37.5 CAPSULE ORAL EVERY MORNING
Qty: 30 CAPSULE | Refills: 0 | Status: SHIPPED | OUTPATIENT
Start: 2019-10-21 | End: 2019-11-20 | Stop reason: SDUPTHER

## 2019-10-21 ASSESSMENT — ENCOUNTER SYMPTOMS
SHORTNESS OF BREATH: 0
ALLERGIC/IMMUNOLOGIC NEGATIVE: 1
NAUSEA: 0
BACK PAIN: 1
ANAL BLEEDING: 0
VOMITING: 0
EYES NEGATIVE: 1
ABDOMINAL PAIN: 0
COUGH: 0
BOWEL INCONTINENCE: 0
RECTAL PAIN: 0
CONSTIPATION: 0
SINUS PRESSURE: 0
BLOOD IN STOOL: 0
ABDOMINAL DISTENTION: 0
SORE THROAT: 0

## 2019-10-22 PROBLEM — M54.16 LUMBAR RADICULOPATHY: Status: ACTIVE | Noted: 2019-10-22

## 2019-10-22 PROBLEM — M43.16 SPONDYLOLISTHESIS OF LUMBAR REGION: Status: ACTIVE | Noted: 2019-10-22

## 2019-10-22 PROBLEM — M51.26 HNP (HERNIATED NUCLEUS PULPOSUS), LUMBAR: Status: ACTIVE | Noted: 2019-10-22

## 2019-10-22 PROBLEM — R63.5 WEIGHT GAIN: Status: ACTIVE | Noted: 2019-10-22

## 2019-11-06 ENCOUNTER — HOSPITAL ENCOUNTER (OUTPATIENT)
Dept: PHYSICAL THERAPY | Age: 54
Setting detail: THERAPIES SERIES
Discharge: HOME | End: 2019-11-06
Payer: COMMERCIAL

## 2019-11-06 PROCEDURE — 97140 MANUAL THERAPY 1/> REGIONS: CPT

## 2019-11-06 PROCEDURE — 97110 THERAPEUTIC EXERCISES: CPT

## 2019-11-06 PROCEDURE — 97162 PT EVAL MOD COMPLEX 30 MIN: CPT

## 2019-11-06 PROCEDURE — G0283 ELEC STIM OTHER THAN WOUND: HCPCS

## 2019-11-06 ASSESSMENT — PAIN SCALES - QUEBEC BACK PAIN DISABILITY SCALE
QUEBEC CMS MODIFIER: CK
STAND UP FOR 20 TO 30 MINUTES: 2
PUT ON SOCKS OR PANYHOSE: 0
REACH UP TO HIGH SHELVES: 1
TAKE FOOD OUT OF THE REFRIGERATOR: 0
THROW A BALL: 1
MAKE YOUR BED: 0
TOTAL SCORE: 41
PULL OR PUSH HEAVY DOORS: 2
WALK A FEW BLOCKS OR 300 TO 400M: 3
CLIMB ONE FLIGHT OF STAIRS: 2
TURN OVER IN BED: 1
WALK SEVERAL KILOMETERS  OR MILES: 3
LIFT AND CARRY A HEAVY SUITCASE: 5
SIT IN A CHAIR FOR SEVERAL HOURS: 4
SLEEP THROUGH THE NIGHT: 2
GET OUT OF BED: 2
RUN ONE BLOCK OR 100M: 5
MOVE A CHAIR: 2
RIDE IN A CAR: 1
BEND OVER TO CLEAN THE BATHTUB: 2
QUEBEC DISABILITY INDEX: 40-59%
CARRY TWO BAGS OF GROCERIES: 3

## 2019-11-06 ASSESSMENT — PAIN DESCRIPTION - LOCATION: LOCATION: BACK;LEG

## 2019-11-06 ASSESSMENT — PAIN SCALES - GENERAL: PAINLEVEL_OUTOF10: 8

## 2019-11-06 ASSESSMENT — PAIN DESCRIPTION - FREQUENCY: FREQUENCY: CONTINUOUS

## 2019-11-06 ASSESSMENT — PAIN DESCRIPTION - PROGRESSION: CLINICAL_PROGRESSION: GRADUALLY WORSENING

## 2019-11-06 ASSESSMENT — PAIN DESCRIPTION - DIRECTION: RADIATING_TOWARDS: TOES OF RIGHT FOOT

## 2019-11-06 ASSESSMENT — PAIN DESCRIPTION - ORIENTATION: ORIENTATION: RIGHT

## 2019-11-06 ASSESSMENT — PAIN DESCRIPTION - DESCRIPTORS: DESCRIPTORS: BURNING;CONSTANT;PINS AND NEEDLES

## 2019-11-06 ASSESSMENT — PAIN DESCRIPTION - ONSET: ONSET: SUDDEN

## 2019-11-06 ASSESSMENT — PAIN DESCRIPTION - PAIN TYPE: TYPE: CHRONIC PAIN

## 2019-11-06 ASSESSMENT — PAIN - FUNCTIONAL ASSESSMENT: PAIN_FUNCTIONAL_ASSESSMENT: PREVENTS OR INTERFERES SOME ACTIVE ACTIVITIES AND ADLS

## 2019-11-11 ENCOUNTER — HOSPITAL ENCOUNTER (OUTPATIENT)
Dept: PHYSICAL THERAPY | Age: 54
Setting detail: THERAPIES SERIES
Discharge: HOME | End: 2019-11-11
Payer: COMMERCIAL

## 2019-11-11 PROCEDURE — 97110 THERAPEUTIC EXERCISES: CPT

## 2019-11-11 PROCEDURE — G0283 ELEC STIM OTHER THAN WOUND: HCPCS

## 2019-11-11 PROCEDURE — 97140 MANUAL THERAPY 1/> REGIONS: CPT

## 2019-11-20 ENCOUNTER — OFFICE VISIT (OUTPATIENT)
Dept: PRIMARY CARE CLINIC | Age: 54
End: 2019-11-20
Payer: COMMERCIAL

## 2019-11-20 VITALS
TEMPERATURE: 98 F | WEIGHT: 157 LBS | DIASTOLIC BLOOD PRESSURE: 77 MMHG | SYSTOLIC BLOOD PRESSURE: 118 MMHG | HEART RATE: 81 BPM | BODY MASS INDEX: 26.13 KG/M2 | OXYGEN SATURATION: 100 %

## 2019-11-20 DIAGNOSIS — L80 VITILIGO: ICD-10-CM

## 2019-11-20 DIAGNOSIS — Z83.3 FAMILY HISTORY OF TYPE 2 DIABETES MELLITUS IN MOTHER: ICD-10-CM

## 2019-11-20 DIAGNOSIS — R49.0 CHRONIC HOARSENESS: Primary | ICD-10-CM

## 2019-11-20 DIAGNOSIS — M43.16 SPONDYLOLISTHESIS OF LUMBAR REGION: ICD-10-CM

## 2019-11-20 DIAGNOSIS — R63.5 WEIGHT GAIN: ICD-10-CM

## 2019-11-20 DIAGNOSIS — B37.0 THRUSH: ICD-10-CM

## 2019-11-20 PROCEDURE — 99214 OFFICE O/P EST MOD 30 MIN: CPT | Performed by: INTERNAL MEDICINE

## 2019-11-20 RX ORDER — PHENTERMINE HYDROCHLORIDE 37.5 MG/1
37.5 CAPSULE ORAL EVERY MORNING
Qty: 30 CAPSULE | Refills: 0 | Status: SHIPPED | OUTPATIENT
Start: 2019-11-20 | End: 2019-12-20 | Stop reason: ALTCHOICE

## 2019-11-20 RX ORDER — CLOTRIMAZOLE 10 MG/1
10 LOZENGE ORAL; TOPICAL
Qty: 50 TABLET | Refills: 0 | Status: SHIPPED | OUTPATIENT
Start: 2019-11-20 | End: 2019-11-30

## 2019-11-20 ASSESSMENT — ENCOUNTER SYMPTOMS
COUGH: 0
BLOOD IN STOOL: 0
BOWEL INCONTINENCE: 0
SORE THROAT: 0
SINUS PRESSURE: 0
BACK PAIN: 1
VOMITING: 0
ABDOMINAL PAIN: 0
SHORTNESS OF BREATH: 0
CONSTIPATION: 0
RECTAL PAIN: 0
ANAL BLEEDING: 0
EYES NEGATIVE: 1
NAUSEA: 0
ABDOMINAL DISTENTION: 0
ALLERGIC/IMMUNOLOGIC NEGATIVE: 1

## 2019-11-21 ENCOUNTER — HOSPITAL ENCOUNTER (OUTPATIENT)
Dept: PHYSICAL THERAPY | Age: 54
Setting detail: THERAPIES SERIES
Discharge: HOME | End: 2019-11-21
Payer: COMMERCIAL

## 2019-11-21 PROCEDURE — G0283 ELEC STIM OTHER THAN WOUND: HCPCS

## 2019-11-21 PROCEDURE — 97110 THERAPEUTIC EXERCISES: CPT

## 2019-11-21 PROCEDURE — 97140 MANUAL THERAPY 1/> REGIONS: CPT

## 2019-11-21 ASSESSMENT — PATIENT HEALTH QUESTIONNAIRE - PHQ9
2. FEELING DOWN, DEPRESSED OR HOPELESS: 0
SUM OF ALL RESPONSES TO PHQ QUESTIONS 1-9: 0
SUM OF ALL RESPONSES TO PHQ QUESTIONS 1-9: 0
1. LITTLE INTEREST OR PLEASURE IN DOING THINGS: 0
SUM OF ALL RESPONSES TO PHQ9 QUESTIONS 1 & 2: 0

## 2019-11-22 ENCOUNTER — OFFICE VISIT (OUTPATIENT)
Dept: ORTHOPEDIC SURGERY | Age: 54
End: 2019-11-22
Payer: COMMERCIAL

## 2019-11-22 VITALS
WEIGHT: 156.97 LBS | SYSTOLIC BLOOD PRESSURE: 107 MMHG | HEART RATE: 74 BPM | BODY MASS INDEX: 26.15 KG/M2 | DIASTOLIC BLOOD PRESSURE: 68 MMHG | HEIGHT: 65 IN

## 2019-11-22 DIAGNOSIS — M47.816 SPONDYLOSIS WITHOUT MYELOPATHY OR RADICULOPATHY, LUMBAR REGION: ICD-10-CM

## 2019-11-22 DIAGNOSIS — M54.16 LUMBAR RADICULOPATHY: Primary | ICD-10-CM

## 2019-11-22 DIAGNOSIS — M96.1 LUMBAR POST-LAMINECTOMY SYNDROME: ICD-10-CM

## 2019-11-22 PROCEDURE — 99213 OFFICE O/P EST LOW 20 MIN: CPT | Performed by: PHYSICAL MEDICINE & REHABILITATION

## 2019-12-03 ENCOUNTER — HOSPITAL ENCOUNTER (OUTPATIENT)
Dept: PHYSICAL THERAPY | Age: 54
Setting detail: THERAPIES SERIES
Discharge: HOME | End: 2019-12-03
Payer: COMMERCIAL

## 2019-12-03 PROCEDURE — 97140 MANUAL THERAPY 1/> REGIONS: CPT

## 2019-12-03 PROCEDURE — G0283 ELEC STIM OTHER THAN WOUND: HCPCS

## 2019-12-03 PROCEDURE — 97110 THERAPEUTIC EXERCISES: CPT

## 2019-12-05 ENCOUNTER — HOSPITAL ENCOUNTER (OUTPATIENT)
Dept: PHYSICAL THERAPY | Age: 54
Setting detail: THERAPIES SERIES
Discharge: HOME | End: 2019-12-05
Payer: COMMERCIAL

## 2019-12-05 DIAGNOSIS — Z11.4 SCREENING FOR HIV WITHOUT PRESENCE OF RISK FACTORS: ICD-10-CM

## 2019-12-05 DIAGNOSIS — Z83.3 FAMILY HISTORY OF TYPE 2 DIABETES MELLITUS IN MOTHER: ICD-10-CM

## 2019-12-05 PROCEDURE — 97140 MANUAL THERAPY 1/> REGIONS: CPT

## 2019-12-05 PROCEDURE — 97110 THERAPEUTIC EXERCISES: CPT

## 2019-12-05 PROCEDURE — G0283 ELEC STIM OTHER THAN WOUND: HCPCS

## 2019-12-06 LAB
ESTIMATED AVERAGE GLUCOSE: 108.3 MG/DL
HBA1C MFR BLD: 5.4 %
HIV AG/AB: NORMAL
HIV ANTIGEN: NORMAL
HIV-1 ANTIBODY: NORMAL
HIV-2 AB: NORMAL

## 2019-12-10 ENCOUNTER — HOSPITAL ENCOUNTER (OUTPATIENT)
Dept: PHYSICAL THERAPY | Age: 54
Setting detail: THERAPIES SERIES
Discharge: HOME | End: 2019-12-10
Payer: COMMERCIAL

## 2019-12-11 ENCOUNTER — HOSPITAL ENCOUNTER (OUTPATIENT)
Dept: PHYSICAL THERAPY | Age: 54
Setting detail: THERAPIES SERIES
Discharge: HOME | End: 2019-12-11
Payer: COMMERCIAL

## 2019-12-11 PROCEDURE — G0283 ELEC STIM OTHER THAN WOUND: HCPCS

## 2019-12-11 PROCEDURE — 97110 THERAPEUTIC EXERCISES: CPT

## 2019-12-11 PROCEDURE — 97140 MANUAL THERAPY 1/> REGIONS: CPT

## 2019-12-20 ENCOUNTER — OFFICE VISIT (OUTPATIENT)
Dept: PRIMARY CARE CLINIC | Age: 54
End: 2019-12-20
Payer: COMMERCIAL

## 2019-12-20 VITALS
SYSTOLIC BLOOD PRESSURE: 101 MMHG | HEART RATE: 75 BPM | RESPIRATION RATE: 18 BRPM | BODY MASS INDEX: 26.66 KG/M2 | DIASTOLIC BLOOD PRESSURE: 64 MMHG | TEMPERATURE: 97 F | OXYGEN SATURATION: 98 % | HEIGHT: 65 IN | WEIGHT: 160 LBS

## 2019-12-20 DIAGNOSIS — B37.81 CANDIDA ESOPHAGITIS (HCC): ICD-10-CM

## 2019-12-20 DIAGNOSIS — J01.00 ACUTE NON-RECURRENT MAXILLARY SINUSITIS: Primary | ICD-10-CM

## 2019-12-20 DIAGNOSIS — M62.838 MUSCLE SPASM: ICD-10-CM

## 2019-12-20 PROCEDURE — 99213 OFFICE O/P EST LOW 20 MIN: CPT | Performed by: INTERNAL MEDICINE

## 2019-12-20 RX ORDER — FLUCONAZOLE 100 MG/1
TABLET ORAL
Qty: 30 TABLET | Refills: 0 | Status: SHIPPED | OUTPATIENT
Start: 2019-12-20 | End: 2020-01-30

## 2019-12-20 RX ORDER — DOXYCYCLINE HYCLATE 100 MG
100 TABLET ORAL 2 TIMES DAILY
Qty: 14 TABLET | Refills: 0 | Status: SHIPPED | OUTPATIENT
Start: 2019-12-20 | End: 2019-12-27

## 2019-12-20 RX ORDER — B-COMPLEX WITH VITAMIN C
1 TABLET ORAL 2 TIMES DAILY
Qty: 60 TABLET | Refills: 11 | Status: SHIPPED | OUTPATIENT
Start: 2019-12-20 | End: 2020-08-03 | Stop reason: SDUPTHER

## 2019-12-20 ASSESSMENT — ENCOUNTER SYMPTOMS
ABDOMINAL DISTENTION: 0
SCALP TENDERNESS: 0
SINUS PRESSURE: 1
SORE THROAT: 0
SINUS COMPLAINT: 1
VOMITING: 0
SHORTNESS OF BREATH: 0
COUGH: 0
NAUSEA: 0
RECTAL PAIN: 0
CONSTIPATION: 0
EYES NEGATIVE: 1
BLOOD IN STOOL: 0
ALLERGIC/IMMUNOLOGIC NEGATIVE: 1
ANAL BLEEDING: 0
BACK PAIN: 1
ABDOMINAL PAIN: 0

## 2019-12-22 PROBLEM — M62.838 MUSCLE SPASM: Status: ACTIVE | Noted: 2019-12-22

## 2019-12-22 PROBLEM — J01.00 ACUTE NON-RECURRENT MAXILLARY SINUSITIS: Status: ACTIVE | Noted: 2019-12-22

## 2019-12-27 ENCOUNTER — OFFICE VISIT (OUTPATIENT)
Dept: ORTHOPEDIC SURGERY | Age: 54
End: 2019-12-27
Payer: COMMERCIAL

## 2019-12-27 ENCOUNTER — TELEPHONE (OUTPATIENT)
Dept: ORTHOPEDIC SURGERY | Age: 54
End: 2019-12-27

## 2019-12-27 VITALS
WEIGHT: 160.05 LBS | BODY MASS INDEX: 26.67 KG/M2 | DIASTOLIC BLOOD PRESSURE: 70 MMHG | SYSTOLIC BLOOD PRESSURE: 108 MMHG | HEIGHT: 65 IN | HEART RATE: 73 BPM

## 2019-12-27 DIAGNOSIS — M54.16 LUMBAR RADICULOPATHY: ICD-10-CM

## 2019-12-27 DIAGNOSIS — G89.4 CHRONIC PAIN SYNDROME: ICD-10-CM

## 2019-12-27 DIAGNOSIS — M96.1 LUMBAR POST-LAMINECTOMY SYNDROME: Primary | ICD-10-CM

## 2019-12-27 PROCEDURE — 99214 OFFICE O/P EST MOD 30 MIN: CPT | Performed by: PHYSICAL MEDICINE & REHABILITATION

## 2019-12-30 DIAGNOSIS — R68.82 LOW LIBIDO: ICD-10-CM

## 2019-12-30 RX ORDER — ESTERIFIED ESTROGENS AND METHYLTESTOSTERONE 1.25; 2.5 MG/1; MG/1
1 TABLET, FILM COATED ORAL DAILY
Qty: 30 TABLET | Refills: 5 | Status: CANCELLED | OUTPATIENT
Start: 2019-12-30

## 2020-01-08 ENCOUNTER — TELEPHONE (OUTPATIENT)
Dept: ORTHOPEDIC SURGERY | Age: 55
End: 2020-01-08

## 2020-01-30 ENCOUNTER — OFFICE VISIT (OUTPATIENT)
Dept: DERMATOLOGY | Age: 55
End: 2020-01-30
Payer: COMMERCIAL

## 2020-01-30 PROBLEM — L81.9 SKIN HYPOPIGMENTATION: Status: ACTIVE | Noted: 2020-01-30

## 2020-01-30 PROBLEM — L81.6 SKIN HYPOPIGMENTATION: Status: ACTIVE | Noted: 2020-01-30

## 2020-01-30 PROCEDURE — 99203 OFFICE O/P NEW LOW 30 MIN: CPT | Performed by: DERMATOLOGY

## 2020-01-30 RX ORDER — TACROLIMUS 1 MG/G
OINTMENT TOPICAL
Qty: 30 G | Refills: 4 | Status: SHIPPED | OUTPATIENT
Start: 2020-01-30

## 2020-01-30 NOTE — PROGRESS NOTES
[facility], [facility]) Physicians    Previous clinic visit: none    CC:  skin discoloration    HPI:    1.) Here today with several mo h/o depigmented skin located on anterior neck. Believes she may be developing another spot on L cheek. No tx to date. Denies antecedent rash or irritation or new exposures. Denies h/o autoimmune phenomena personally or in family. Has been wearing a serge silver necklace from [employer] for 9 years. DERM HISTORY:   Personal history of NMSC or MM- no    Family history of NMSC or MM- no   Sunburns easily- No   Uses sunscreen- No  History of tanning bed use- No    ADDITIONAL HISTORY:    I have reviewed past medical and surgical histories, current medications, allergies, social and family histories as documented in the patient's electronic medical record. Current Outpatient Medications   Medication Sig Dispense Refill    fexofenadine (ALLEGRA) 180 MG tablet Take 1 tablet by mouth daily Stop zyrtec 30 tablet 5    EST ESTROGENS-METHYLTEST DS 1.25-2.5 MG TABS Take 1 each by mouth daily. 30 tablet 5    calcium carbonate-vitamin D (CALCIUM 600+D) 600-200 MG-UNIT TABS Take 1 each by mouth 2 times daily 60 tablet 11    lansoprazole (PREVACID) 30 MG delayed release capsule TAKE 1 CAPSULE BY MOUTH ONCE DAILY  3    pantoprazole (PROTONIX) 40 MG tablet Take 1 tablet by mouth 2 times daily (before meals) 60 tablet 1    Cholecalciferol (VITAMIN D) 2000 units CAPS capsule Take 1 capsule by mouth daily 30 capsule 1    topiramate (TOPAMAX) 50 MG tablet Take 1 tablet by mouth nightly 30 tablet 5    azelastine (ASTELIN) 0.1 % nasal spray 1 spray by Nasal route 2 times daily Use in each nostril as directed 1 Bottle 5    albuterol sulfate HFA (VENTOLIN HFA) 108 (90 Base) MCG/ACT inhaler Inhale 2 puffs into the lungs every 6 hours as needed for Wheezing 1 Inhaler 3     No current facility-administered medications for this visit.         ROS:    General: No fevers, chills,

## 2020-01-30 NOTE — PATIENT INSTRUCTIONS
1. Start tacrolimus ointment twice daily to depigmented areas    2.  Try dermablend makeup line for coverup; can buy at [employer]

## 2020-02-05 ENCOUNTER — OFFICE VISIT (OUTPATIENT)
Dept: PRIMARY CARE CLINIC | Age: 55
End: 2020-02-05
Payer: COMMERCIAL

## 2020-02-05 VITALS
SYSTOLIC BLOOD PRESSURE: 116 MMHG | OXYGEN SATURATION: 99 % | WEIGHT: 161 LBS | BODY MASS INDEX: 26.79 KG/M2 | TEMPERATURE: 96.9 F | DIASTOLIC BLOOD PRESSURE: 74 MMHG | HEART RATE: 66 BPM | RESPIRATION RATE: 17 BRPM

## 2020-02-05 PROCEDURE — 99213 OFFICE O/P EST LOW 20 MIN: CPT | Performed by: INTERNAL MEDICINE

## 2020-02-05 RX ORDER — TOPIRAMATE 100 MG/1
100 TABLET, FILM COATED ORAL 2 TIMES DAILY
Qty: 60 TABLET | Refills: 5 | Status: SHIPPED | OUTPATIENT
Start: 2020-02-05 | End: 2020-08-03 | Stop reason: SDUPTHER

## 2020-02-05 ASSESSMENT — ENCOUNTER SYMPTOMS
RECTAL PAIN: 0
ABDOMINAL PAIN: 0
EYE WATERING: 0
SINUS PRESSURE: 0
EYE PAIN: 1
COUGH: 0
EYE REDNESS: 0
RHINORRHEA: 0
PHOTOPHOBIA: 1
SCALP TENDERNESS: 0
CONSTIPATION: 0
SORE THROAT: 0
ABDOMINAL DISTENTION: 0
BACK PAIN: 1
VOMITING: 0
NAUSEA: 1
ANAL BLEEDING: 0
SHORTNESS OF BREATH: 0
BLOOD IN STOOL: 0
ALLERGIC/IMMUNOLOGIC NEGATIVE: 1

## 2020-02-05 ASSESSMENT — PATIENT HEALTH QUESTIONNAIRE - PHQ9
SUM OF ALL RESPONSES TO PHQ9 QUESTIONS 1 & 2: 0
1. LITTLE INTEREST OR PLEASURE IN DOING THINGS: 0
SUM OF ALL RESPONSES TO PHQ QUESTIONS 1-9: 0
SUM OF ALL RESPONSES TO PHQ QUESTIONS 1-9: 0
2. FEELING DOWN, DEPRESSED OR HOPELESS: 0
SUM OF ALL RESPONSES TO PHQ QUESTIONS 1-9: 0
2. FEELING DOWN, DEPRESSED OR HOPELESS: 0
DEPRESSION UNABLE TO ASSESS: FUNCTIONAL CAPACITY MOTIVATION LIMITS ACCURACY
SUM OF ALL RESPONSES TO PHQ QUESTIONS 1-9: 0
SUM OF ALL RESPONSES TO PHQ9 QUESTIONS 1 & 2: 0
1. LITTLE INTEREST OR PLEASURE IN DOING THINGS: 0

## 2020-02-07 ENCOUNTER — OFFICE VISIT (OUTPATIENT)
Dept: ORTHOPEDIC SURGERY | Age: 55
End: 2020-02-07
Payer: COMMERCIAL

## 2020-02-07 VITALS
HEIGHT: 65 IN | HEART RATE: 65 BPM | WEIGHT: 160.94 LBS | SYSTOLIC BLOOD PRESSURE: 106 MMHG | BODY MASS INDEX: 26.81 KG/M2 | DIASTOLIC BLOOD PRESSURE: 71 MMHG

## 2020-02-07 PROCEDURE — 99214 OFFICE O/P EST MOD 30 MIN: CPT | Performed by: PHYSICAL MEDICINE & REHABILITATION

## 2020-02-12 NOTE — PROGRESS NOTES
PATIENT REACHED   YES____NO__x__    PREOP INSTUCTIONS LEFT ON  [id number]______________      DATE_2/24/20________ TIME_0700________ARRIVAL_0600_______PLACE___masc_________  NOTHING TO EAT OR DRINK  AFTER MIDNIGHT THE EVENING PRIOR OR AS INSTRUCTED BY YOUR DR.  Jordana Nicholas NEED A RESPONSIBLE ADULT AGE 18 OR OLDER TO DRIVE YOU HOME  PLEASE BRING INSURANCE CARD. PICTURE ID AND COMPLETE LIST OF MEDS  WEAR LOOSE COMFORTABLE CLOTHING  FOLLOW ANY INSTRUCTIONS YOUR DRS OFFICE HAS GIVEN YOU,INCLUDING WHAT MEDICATIONS TO TAKE THE AM OF PROCEDURE AND WHEN AND IF YOU NEED TO STOP ANY BLOOD THINNERS. IF YOU HAVE QUESTIONS REGARDING THIS CALL THE OFFICE  THE GOAL BLOOD SUGAR THE AM OF PROCEDURE  OR LESS ABOVE THAT THE PROCEDURE MAY BE CANCELLED  ANY QUESTIONS CALL YOUR DOCTOR. ALSO,PLEASE READ THE INSTRUCTION PACKET FROM YOUR DR IF YOU RECEIVED ONE.   SPINE INTERVENTION NUMBER [phone]

## 2020-02-19 RX ORDER — CLINDAMYCIN HYDROCHLORIDE 300 MG/1
300 CAPSULE ORAL 3 TIMES DAILY
Qty: 15 CAPSULE | Refills: 0 | Status: SHIPPED | OUTPATIENT
Start: 2020-02-24 | End: 2020-02-29

## 2020-02-24 ENCOUNTER — HOSPITAL ENCOUNTER (OUTPATIENT)
Age: 55
Setting detail: OUTPATIENT SURGERY
Discharge: HOME | End: 2020-02-24
Attending: PHYSICAL MEDICINE & REHABILITATION | Admitting: PHYSICAL MEDICINE & REHABILITATION
Payer: COMMERCIAL

## 2020-02-24 ENCOUNTER — APPOINTMENT (OUTPATIENT)
Dept: GENERAL RADIOLOGY | Age: 55
End: 2020-02-24
Attending: PHYSICAL MEDICINE & REHABILITATION
Payer: COMMERCIAL

## 2020-02-24 VITALS
RESPIRATION RATE: 16 BRPM | SYSTOLIC BLOOD PRESSURE: 104 MMHG | TEMPERATURE: 98.4 F | HEIGHT: 66 IN | OXYGEN SATURATION: 100 % | DIASTOLIC BLOOD PRESSURE: 66 MMHG | BODY MASS INDEX: 24.75 KG/M2 | HEART RATE: 65 BPM | WEIGHT: 154 LBS

## 2020-02-24 LAB
APTT: 30.8 SEC (ref 24.2–36.2)
HCT VFR BLD CALC: 42.1 % (ref 36–48)
HEMOGLOBIN: 14 G/DL (ref 12–16)
INR BLD: 0.97 (ref 0.86–1.14)
MCH RBC QN AUTO: 31.1 PG (ref 26–34)
MCHC RBC AUTO-ENTMCNC: 33.3 G/DL (ref 31–36)
MCV RBC AUTO: 93.2 FL (ref 80–100)
PDW BLD-RTO: 13.1 % (ref 12.4–15.4)
PLATELET # BLD: 259 K/UL (ref 135–450)
PMV BLD AUTO: 8.2 FL (ref 5–10.5)
PROTHROMBIN TIME: 11.3 SEC (ref 10–13.2)
RBC # BLD: 4.51 M/UL (ref 4–5.2)
WBC # BLD: 3.6 K/UL (ref 4–11)

## 2020-02-24 PROCEDURE — 3610000059 HC PAIN LEVEL 5 ADDL 15 MIN (NON-OR): Performed by: PHYSICAL MEDICINE & REHABILITATION

## 2020-02-24 PROCEDURE — 85610 PROTHROMBIN TIME: CPT

## 2020-02-24 PROCEDURE — 85027 COMPLETE CBC AUTOMATED: CPT

## 2020-02-24 PROCEDURE — 99153 MOD SED SAME PHYS/QHP EA: CPT | Performed by: PHYSICAL MEDICINE & REHABILITATION

## 2020-02-24 PROCEDURE — 2500000003 HC RX 250 WO HCPCS: Performed by: PHYSICIAN ASSISTANT

## 2020-02-24 PROCEDURE — 6360000002 HC RX W HCPCS: Performed by: PHYSICAL MEDICINE & REHABILITATION

## 2020-02-24 PROCEDURE — 3610000058 HC PAIN LEVEL 5 BASE (NON-OR): Performed by: PHYSICAL MEDICINE & REHABILITATION

## 2020-02-24 PROCEDURE — C1778 LEAD, NEUROSTIMULATOR: HCPCS | Performed by: PHYSICAL MEDICINE & REHABILITATION

## 2020-02-24 PROCEDURE — 2500000003 HC RX 250 WO HCPCS: Performed by: PHYSICAL MEDICINE & REHABILITATION

## 2020-02-24 PROCEDURE — 99152 MOD SED SAME PHYS/QHP 5/>YRS: CPT | Performed by: PHYSICAL MEDICINE & REHABILITATION

## 2020-02-24 PROCEDURE — 2580000003 HC RX 258: Performed by: PHYSICIAN ASSISTANT

## 2020-02-24 PROCEDURE — 85730 THROMBOPLASTIN TIME PARTIAL: CPT

## 2020-02-24 PROCEDURE — 3209999900 FLUORO FOR SURGICAL PROCEDURES

## 2020-02-24 PROCEDURE — 2709999900 HC NON-CHARGEABLE SUPPLY: Performed by: PHYSICAL MEDICINE & REHABILITATION

## 2020-02-24 DEVICE — TRIAL LEAD KIT, 50CM WITH 5MM SPACING
Type: IMPLANTABLE DEVICE | Status: FUNCTIONAL
Brand: NEVRO®

## 2020-02-24 RX ORDER — BACLOFEN 5 MG/1
TABLET ORAL
COMMUNITY
End: 2020-08-03 | Stop reason: SDUPTHER

## 2020-02-24 RX ORDER — SODIUM CHLORIDE 9 MG/ML
INJECTION, SOLUTION INTRAVENOUS CONTINUOUS
Status: DISCONTINUED | OUTPATIENT
Start: 2020-02-24 | End: 2020-02-24 | Stop reason: HOSPADM

## 2020-02-24 RX ORDER — FENTANYL CITRATE 50 UG/ML
INJECTION, SOLUTION INTRAMUSCULAR; INTRAVENOUS
Status: COMPLETED | OUTPATIENT
Start: 2020-02-24 | End: 2020-02-24

## 2020-02-24 RX ORDER — LIDOCAINE HYDROCHLORIDE 10 MG/ML
INJECTION, SOLUTION EPIDURAL; INFILTRATION; INTRACAUDAL; PERINEURAL
Status: COMPLETED | OUTPATIENT
Start: 2020-02-24 | End: 2020-02-24

## 2020-02-24 RX ORDER — MIDAZOLAM HYDROCHLORIDE 1 MG/ML
INJECTION INTRAMUSCULAR; INTRAVENOUS
Status: COMPLETED | OUTPATIENT
Start: 2020-02-24 | End: 2020-02-24

## 2020-02-24 RX ORDER — CLINDAMYCIN PHOSPHATE 900 MG/50ML
900 INJECTION INTRAVENOUS ONCE
Status: COMPLETED | OUTPATIENT
Start: 2020-02-24 | End: 2020-02-24

## 2020-02-24 RX ADMIN — CLINDAMYCIN PHOSPHATE 900 MG: 900 INJECTION, SOLUTION INTRAVENOUS at 06:24

## 2020-02-24 RX ADMIN — SODIUM CHLORIDE: 9 INJECTION, SOLUTION INTRAVENOUS at 06:24

## 2020-02-24 ASSESSMENT — PAIN DESCRIPTION - DESCRIPTORS: DESCRIPTORS: ACHING

## 2020-02-24 ASSESSMENT — PAIN SCALES - GENERAL
PAINLEVEL_OUTOF10: 0
PAINLEVEL_OUTOF10: 0

## 2020-02-24 ASSESSMENT — PAIN - FUNCTIONAL ASSESSMENT: PAIN_FUNCTIONAL_ASSESSMENT: 0-10

## 2020-02-24 NOTE — PROGRESS NOTES
IV discontinued, catheter intact, and dressing applied. Procedural dressing dry and intact. Bilateral lower0 extremities equal in strength. Discharge instructions reviewed with patient or responsible adult, signed and copy given. All home medications have been reviewed. All questions answered and patient or responsible adult verbalized understanding.   PAIN LEVEL AT DISCHARGE _0____

## 2020-02-24 NOTE — H&P
HISTORY AND PHYSICAL/PRE-SEDATION ASSESSMENT    Patient:  Melissa Hernandez   :  1965  Medical Record No.:  [id number]   Date:  2020  Physician:  Akin Marie M.D. Facility: [address] [place] [address]    HISTORY OF PRESENT ILLNESS:                 The patient is a 49 y.o. female whom presents with low back and leg pain. Review of the imaging and physical exam of the patient confirmed the pre-procedure diagnosis. After a thorough discussion of risks, benefits and alternatives informed consent was obtained. Past Medical History:   Past Medical History:   Diagnosis Date    Allergic rhinitis     Chronic back pain     l4-L5 lumbar spinal stenosis    Constipation     Diverticulosis     Hyperlipidemia       Past Surgical History:     Past Surgical History:   Procedure Laterality Date    COLONOSCOPY N/A 2019    COLONOSCOPY biopsy sigmoid colon polyp performed by Fallon Haines MD at [address] [place] [address]    endometriosis    HYSTERECTOMY, TOTAL ABDOMINAL      one ovary remains    LUMBAR FUSION Left 11/10/2017    LEFT L4-L5, L5-S1 TRANSFORAMINAL LUMBAR INTERBODY FUSION WITH with CT navigation    LUMBAR NERVE BLOCK N/A 2019    L3L4 INTERLAMINAR EPIDURAL STEROID INJECTION WITH FLUOROSCOPY performed by Akin Marie MD at [address] [place] [address] Right 2019    RIGHT L3 AND L4 TRANSFORAMINAL EPIDURAL STEROID INJECTION WITH FLUOROSCOPY performed by Akin Marie MD at [facility] SALPINGO-OOPHORECTOMY      unilat - rt - WITH HYSTERECTOMY    TUBAL LIGATION      UPPER GASTROINTESTINAL ENDOSCOPY N/A 2019    EGD gastric BIOPSY and GE Junction biopsy and esophageal brush for radha performed by Fallon Haines MD at [place] [address]     Current Medications:   Prior to Admission medications    Medication Sig Start Date End Date Taking?  Authorizing Provider   Baclofen 5 MG TABS Take by mouth   Yes Historical Provider, MD   topiramate (TOPAMAX) 100

## 2020-02-24 NOTE — OP NOTE
made by an 11 blade. A 14 gauge Touhy needle was 1st utilized for entry into the epidural space at an approximate 45° angle needle was advanced using a paramedian approach into the T12/L1 interspace. Loss-of-resistance was utilized and epidural space was entered at the T12/L1 interspace. Lateral fluoroscopic view was obtained to confirm position of the Touhy needle. The patient did not complain of any pain or paresthesia during needle placement. Stylet was removed from the Touhy needle. The spinal cord stimulator lead was advanced through the Touhy needle under direct visualization with pulsed Fluoroscopy. The tip of the stimulator was aligned with the superior plate of T9. The medial aspect of the left L2 pedicle was anesthetized with 1% lidocaine. A 14-gauge 4 inch Touhy was advanced using loss-of-resistance to sabi technique into the epidural space at the T12/L1 interspace. A 45° angle or less was utilized to advance the needle. Loss-of-resistance was utilized and epidural space was entered at the T12/L1 interspace. Lateral fluoroscopic view was utilized to confirm position of the needle. The spinal cord stimulator lead was advanced through the Touhy needle under direct visualization medially and slightly to the right of midline. The tip of the lead was aligned with the superior endplate of T9. At this point the stimlator leads were attached and testing was undertaken. This was performed with the assistance of an SCS device representative from [facility]. The stylet was then removed from the lead followed by the Touhy needle. The stimulator lead was then anchored with Steri-Strips. The temporary connector was attached. The patient tolerated the procedure well and was escorted back to recovery area. Patient was given Clindamycin 3 times a day to be discharged with. An AP and lateral of the thoracic spine was also undertaken prior to discharge.   Patient was also given a pamphlet

## 2020-02-25 ENCOUNTER — TELEPHONE (OUTPATIENT)
Dept: ORTHOPEDIC SURGERY | Age: 55
End: 2020-02-25

## 2020-02-27 NOTE — TELEPHONE ENCOUNTER
The patient was not available to take a call as she is at work. The representative from [facility] did speak with the patient and she is not seeing relief at this time. The programming has been moved to #2. The representative will call the patient tomorrow morning. She may be extended to Monday.

## 2020-02-28 ENCOUNTER — OFFICE VISIT (OUTPATIENT)
Dept: ORTHOPEDIC SURGERY | Age: 55
End: 2020-02-28

## 2020-02-28 VITALS
HEART RATE: 68 BPM | DIASTOLIC BLOOD PRESSURE: 70 MMHG | HEIGHT: 66 IN | WEIGHT: 154.1 LBS | SYSTOLIC BLOOD PRESSURE: 114 MMHG | BODY MASS INDEX: 24.77 KG/M2

## 2020-02-28 PROCEDURE — 99024 POSTOP FOLLOW-UP VISIT: CPT | Performed by: PHYSICIAN ASSISTANT

## 2020-02-28 NOTE — PROGRESS NOTES
Follow up: Vini Allen  1965  [id number]         Chief Complaint   Patient presents with    Lower Back Pain     F/u LSP. SCS LEAD PULL 2/24         HISTORY OF PRESENT ILLNESS:  Ms. Rocío Tran is a 49 y.o. female returns for a follow up visit for multiple medical problems. Her current presenting problems are   1. Lumbar post-laminectomy syndrome    2. Lumbar radiculopathy    3. Chronic pain syndrome    4. Spondylosis without myelopathy or radiculopathy, lumbar region    . As per information/history obtained from the PADT(patient assessment and documentation tool) - She complains of pain in the lower back with radiation to the lower back She rates the pain 6/10 and describes it as aching, burning. Pain is made worse by: standing, sitting. She denies side effects from the current pain regimen. Patient reports that since the last follow up visit the physical functioning is unchanged, family/social relationships are unchanged, mood is unchanged and sleep patterns are unchanged, and that the overall functioning is unchanged. Patient denies neurological bowel or bladder. The patient presents today after a spinal cord stimulator trial was implanted on 2/24/2020 with Nevro. The patient describes that she did not have improvement and continues to have lower back pain at a 6/10 in severity and it is an aching and burning pain. She describes that ice and rest does help however overall she is not better at this time she may even be a little worse.       Associated signs and symptoms:   Neurogenic bowel or bladder symptoms:  no   Perceived weakness:  no   Difficulty walking:  no              Past Medical History:   Past Medical History:   Diagnosis Date    Allergic rhinitis     Chronic back pain     l4-L5 lumbar spinal stenosis    Constipation     Diverticulosis     Hyperlipidemia       Past Surgical History:     Past Surgical History:   Procedure Laterality Date    COLONOSCOPY N/A 9/13/2019 during the hospital encounter of 20   CBC   Result Value Ref Range    WBC 3.6 (L) 4.0 - 11.0 K/uL    RBC 4.51 4.00 - 5.20 M/uL    Hemoglobin 14.0 12.0 - 16.0 g/dL    Hematocrit 42.1 36.0 - 48.0 %    MCV 93.2 80.0 - 100.0 fL    MCH 31.1 26.0 - 34.0 pg    MCHC 33.3 31.0 - 36.0 g/dL    RDW 13.1 12.4 - 15.4 %    Platelets 332 708 - 858 K/uL    MPV 8.2 5.0 - 10.5 fL   Protime-INR   Result Value Ref Range    Protime 11.3 10.0 - 13.2 sec    INR 0.97 0.86 - 1.14   APTT   Result Value Ref Range    aPTT 30.8 24.2 - 36.2 sec     Impression:       1. Lumbar post-laminectomy syndrome    2. Lumbar radiculopathy    3. Chronic pain syndrome    4. Spondylosis without myelopathy or radiculopathy, lumbar region        Plan:  Clinical Course: shows no change    I discussed the diagnosis and the treatment options with Lou Carvajal today. In Summary:  The various treatment options were outlined and discussed with Lou Carvajal including:  Conservative care options: physical therapy, ice, medications, bracing, and activity modification. The indications for therapeutic injections. The indications for additional imaging/laboratory studies. The indications for (possible future) interventions. After considering the various options discussed, Brittjhonatan Carvajal elected to pursue a course of treatment that includes the followin. Medications:  No further recommendations for new medications. 2. PT:  Encouraged to continue with Home exercise program.    3. Further studies: No further studies. 4. Interventional:  The patient did not improve with the spinal cord stimulator trial with Nevro. 5. Follow up:  1-2 weeks with Dr. Myron Truong. Lou Carvajal was instructed to call the office if her symptoms worsen or if new symptoms appear prior to the next scheduled visit.  She is specifically instructed to contact the office between now & her scheduled appointment if she has concerns related to her condition or if she needs assistance in scheduling the above tests. She is welcome to call for an appointment sooner if she has any additional concerns or questions. RODGER Carroll, PA-C  Board Certified by the [employer] on Certification of Physician Assistants  Kwabena Kennedy 59  Partner of [facility] ([facility])           This dictation was performed with a verbal recognition program [facility]) and it was checked for errors. It is possible that there are still dictated errors within this office note. If so, please bring any errors to my attention for an addendum. All efforts were made to ensure that this office note is accurate.

## 2020-02-28 NOTE — TELEPHONE ENCOUNTER
The patient spoke with the [facility] today and described that she did not feel relief from the SCS trial.  She will remain on the schedule for today for lead pull, she does not wish to extend her trial at this time.

## 2020-03-06 ENCOUNTER — OFFICE VISIT (OUTPATIENT)
Dept: PRIMARY CARE CLINIC | Age: 55
End: 2020-03-06
Payer: COMMERCIAL

## 2020-03-06 VITALS
TEMPERATURE: 97.1 F | HEART RATE: 66 BPM | BODY MASS INDEX: 25.71 KG/M2 | WEIGHT: 160 LBS | DIASTOLIC BLOOD PRESSURE: 74 MMHG | SYSTOLIC BLOOD PRESSURE: 108 MMHG | OXYGEN SATURATION: 98 % | HEIGHT: 66 IN

## 2020-03-06 PROCEDURE — 99214 OFFICE O/P EST MOD 30 MIN: CPT | Performed by: INTERNAL MEDICINE

## 2020-03-06 RX ORDER — TRIAMCINOLONE ACETONIDE OINTMENT USP, 0.05% 0.5 MG/G
OINTMENT TOPICAL 2 TIMES DAILY
Qty: 430 G | Refills: 0 | Status: SHIPPED | OUTPATIENT
Start: 2020-03-06

## 2020-03-06 RX ORDER — AZITHROMYCIN 250 MG/1
250 TABLET, FILM COATED ORAL SEE ADMIN INSTRUCTIONS
Qty: 6 TABLET | Refills: 0 | Status: SHIPPED | OUTPATIENT
Start: 2020-03-06 | End: 2020-03-11

## 2020-03-06 NOTE — PROGRESS NOTES
3/6/2020     Quyen Caruso (:  1965) is a 49 y.o. female, here for evaluation of the following medical concerns:    Pharyngitis   This is a new problem. The current episode started in the past 7 days. The problem occurs constantly. The problem has been gradually worsening. Associated symptoms include fatigue, numbness and a sore throat. Pertinent negatives include no abdominal pain, arthralgias, chest pain, chills, congestion, coughing, diaphoresis, fever, headaches, joint swelling, myalgias, nausea, neck pain, vomiting or weakness. Nothing aggravates the symptoms. She has tried nothing for the symptoms. The treatment provided no relief. Patient Active Problem List   Diagnosis    Allergic rhinitis    Vitamin D deficiency    Insomnia    Hyperlipidemia    Neck pain    Constipation    Lumbar stenosis    Candida esophagitis (HCC)    Weight gain    Lumbar radiculopathy    Spondylolisthesis of lumbar region    HNP (herniated nucleus pulposus), lumbar    Muscle spasm    Acute non-recurrent maxillary sinusitis    Skin hypopigmentation     Allergies   Allergen Reactions    Fruit Acid Concentrate      Tomato, strawberries    Penicillins Hives    Pollen Extract     Tomato Anaphylaxis     Vomiting  Vomiting    Bee Pollen     Fruit & Vegetable Daily  [Cholestatin]     Fruit Extracts     Influenza Vac Split Quad     Meloxicam      Rapid weight gain    Versed [Midazolam] Other (See Comments)     Extremely sleepy with a dose of 2 mg. Reversal used To wake patient up. She states this happened at her colonoscopy the week before also.  Influenza Vaccines Rash     Hives with vaccine. Review of Systems   Constitutional: Positive for fatigue. Negative for chills, diaphoresis and fever. HENT: Positive for sinus pressure and sore throat. Negative for congestion, ear pain and postnasal drip. Eyes: Negative. Respiratory: Negative for cough and shortness of breath.     Cardiovascular: Negative. Negative for chest pain. Gastrointestinal: Negative for abdominal distention, abdominal pain, anal bleeding, blood in stool, constipation, nausea, rectal pain and vomiting. Endocrine: Negative. Genitourinary: Negative. Negative for dysuria and pelvic pain. Musculoskeletal: Positive for back pain. Negative for arthralgias, joint swelling, myalgias and neck pain. Right hip pain   Skin: Negative. Finger nail thickening. Allergic/Immunologic: Negative. Neurological: Positive for numbness. Negative for weakness and headaches. Lumbar laminectomy November of 2017 at [facility] with resolution of spinal claudication for about a year and symptoms return. Incomplete relieved with epidural steroid injections. Trial of the spinal stimulator failed since patient state is increased pain. Patient is return to spine specialist for the next option. Hematological: Negative. Psychiatric/Behavioral: Negative. Prior to Visit Medications    Medication Sig Taking? Authorizing Provider   azithromycin (ZITHROMAX) 250 MG tablet Take 1 tablet by mouth See Admin Instructions for 5 days 500mg on day 1 followed by 250mg on days 2 - 5 Yes Zuleyma Levine MD   triamcinolone (KENALOG) 0.05 % OINT ointment Apply topically 2 times daily To back of the neck Yes Zuleyma Levine MD   lansoprazole (PREVACID) 30 MG delayed release capsule Take 1 capsule by mouth once daily Yes Zuleyma Levine MD   Baclofen 5 MG TABS Take by mouth Yes Historical Provider, MD   topiramate (TOPAMAX) 100 MG tablet Take 1 tablet by mouth 2 times daily Yes Zuleyma Levine MD   tacrolimus (PROTOPIC) 0.1 % ointment Apply to affected area BID Yes Michela Cortez MD   fexofenadine (ALLEGRA) 180 MG tablet Take 1 tablet by mouth daily Stop zyrtec Yes Zuleyma Levine MD   EST ESTROGENS-METHYLTEST DS 1.25-2.5 MG TABS Take 1 each by mouth daily.  Yes Zuleyma Levine MD   calcium gallop. Pulmonary:      Effort: Pulmonary effort is normal. No respiratory distress. Breath sounds: Normal breath sounds. No wheezing or rales. Chest:      Chest wall: No tenderness. Abdominal:      General: Bowel sounds are normal. There is no distension. Palpations: Abdomen is soft. There is no mass. Tenderness: There is no abdominal tenderness. There is no guarding or rebound. Musculoskeletal:         General: No tenderness or deformity. Lymphadenopathy:      Cervical: No cervical adenopathy. Skin:     General: Skin is warm and dry. Coloration: Skin is not pale. Findings: No erythema or rash. Comments: Find papillary ration hyperpigmentation posterior neck consistent with atopic dermatitis   Neurological:      Mental Status: She is alert and oriented to person, place, and time. Cranial Nerves: No cranial nerve deficit. Motor: No abnormal muscle tone. Coordination: Coordination normal.      Deep Tendon Reflexes: Reflexes normal.      Comments: Decreased knee jerks , kurtis biceps and triceps reflexes. Reduced spasm in lower back. Reduced range of motion of lumbar spine. Psychiatric:         Behavior: Behavior normal.         Thought Content: Thought content normal.         Judgment: Judgment normal.         ASSESSMENT/PLAN:       Diagnosis Orders   1. Pharyngitis, bacterial  azithromycin (ZITHROMAX) 250 MG tablet     2. Eczema of back of the neck give triamcinolone daily into resolved. Keep lubricated with Vaseline. Diagnosis Orders   3. Other fatigue, will further evaluate. Vitamin B12    TSH WITH REFLEX TO FT4    Renal Function Panel        4. Vitamin D deficiency  Vitamin D 25 Hydroxy              An electronic signature was used to authenticate this note.     --Filiberto Morris MD on 3/6/2020 at 5:05 PM

## 2020-03-10 PROBLEM — L30.9 ECZEMA: Status: ACTIVE | Noted: 2020-03-10

## 2020-03-10 ASSESSMENT — ENCOUNTER SYMPTOMS
ANAL BLEEDING: 0
SINUS PRESSURE: 1
BLOOD IN STOOL: 0
CONSTIPATION: 0
NAUSEA: 0
BACK PAIN: 1
SHORTNESS OF BREATH: 0
VOMITING: 0
ABDOMINAL PAIN: 0
ABDOMINAL DISTENTION: 0
EYES NEGATIVE: 1
COUGH: 0
ALLERGIC/IMMUNOLOGIC NEGATIVE: 1
RECTAL PAIN: 0
SORE THROAT: 1

## 2020-03-13 ENCOUNTER — OFFICE VISIT (OUTPATIENT)
Dept: ORTHOPEDIC SURGERY | Age: 55
End: 2020-03-13
Payer: COMMERCIAL

## 2020-03-13 VITALS
DIASTOLIC BLOOD PRESSURE: 71 MMHG | HEIGHT: 66 IN | HEART RATE: 65 BPM | WEIGHT: 160.05 LBS | BODY MASS INDEX: 25.72 KG/M2 | SYSTOLIC BLOOD PRESSURE: 106 MMHG

## 2020-03-13 PROCEDURE — 99214 OFFICE O/P EST MOD 30 MIN: CPT | Performed by: PHYSICAL MEDICINE & REHABILITATION

## 2020-03-13 NOTE — PROGRESS NOTES
Follow up: Argentina Katz  1965  [id number]         Chief Complaint   Patient presents with    Lower Back Pain     F/u LSP. SCS trial 2/24 - 2/28         HISTORY OF PRESENT ILLNESS:  Ms. Stephanie Leigh is a 49 y.o. female returns for a follow up visit for multiple medical problems. Her current presenting problems are   1. Lumbar post-laminectomy syndrome    2. Lumbar radiculopathy    3. Chronic pain syndrome    4. Spondylosis without myelopathy or radiculopathy, lumbar region    5. Paresthesia of right lower extremity    . As per information/history obtained from the PADT(patient assessment and documentation tool) - She complains of pain in the lower back with radiation to the upper leg Right, lower leg Right and feet Right She rates the pain 6/10 and describes it as aching, burning, numbness. Pain is made worse by: standing, sitting. She denies side effects from the current pain regimen. Patient reports that since the last follow up visit the physical functioning is unchanged, family/social relationships are unchanged, mood is unchanged and sleep patterns are unchanged, and that the overall functioning is worse. Patient denies neurological bowel or bladder. Ms. Stephanie Leigh presents today for follow up of her ongoing low back pain and right leg numbness. She was last seen a few weeks ago and underwent a SCS trial from 2/24/20 to 2/28/20. At that time, she denied any improvement with trial.  She is here today to follow up and see what else can be done. She denies any new injuries or triggers to her back or leg since her last visit. She continues to report an achy, burning pain in her lower back that is made worse by standing and sitting activities. She also notes right leg numbness that radiates from her upper leg into her right foot.         Associated signs and symptoms:   Neurogenic bowel or bladder symptoms:  no   Perceived weakness:  no   Difficulty walking:  no              Past Medical History: Past Medical History:   Diagnosis Date    Allergic rhinitis     Chronic back pain     l4-L5 lumbar spinal stenosis    Constipation     Diverticulosis     Hyperlipidemia       Past Surgical History:     Past Surgical History:   Procedure Laterality Date    COLONOSCOPY N/A 9/13/2019    COLONOSCOPY biopsy sigmoid colon polyp performed by Aleksandra Dumont MD at [facility]    endometriosis    HYSTERECTOMY, TOTAL ABDOMINAL      one ovary remains    LUMBAR FUSION Left 11/10/2017    LEFT L4-L5, L5-S1 TRANSFORAMINAL LUMBAR INTERBODY FUSION WITH with CT navigation    LUMBAR NERVE BLOCK N/A 9/18/2019    L3L4 INTERLAMINAR EPIDURAL STEROID INJECTION WITH FLUOROSCOPY performed by Marlena Blackwood MD at [facility] Right 8/5/2019    RIGHT L3 AND L4 TRANSFORAMINAL EPIDURAL STEROID INJECTION WITH FLUOROSCOPY performed by Marlena Blackwood MD at [facility] SALPINGO-OOPHORECTOMY      unilat - rt - [address] STIMULATOR SURGERY N/A 2/24/2020    SPINAL CORD STIMULATOR TRIAL WITH FLUOROSCOPY (41066, 33282, [id number]) -  NEVRO performed by Marlena Blackwood MD at [address] [facility] ENDOSCOPY N/A 9/13/2019    EGD gastric BIOPSY and GE Junction biopsy and esophageal brush for radha performed by Aleksandra Dumont MD at [facility] ENDOSCOPY     Current Medications:     Current Outpatient Medications:     triamcinolone (KENALOG) 0.05 % OINT ointment, Apply topically 2 times daily To back of the neck, Disp: 430 g, Rfl: 0    lansoprazole (PREVACID) 30 MG delayed release capsule, Take 1 capsule by mouth once daily, Disp: 30 capsule, Rfl: 5    Baclofen 5 MG TABS, Take by mouth, Disp: , Rfl:     topiramate (TOPAMAX) 100 MG tablet, Take 1 tablet by mouth 2 times daily, Disp: 60 tablet, Rfl: 5    tacrolimus (PROTOPIC) 0.1 % ointment, Apply to affected area BID, Disp: 30 g, Rfl: 4    fexofenadine (ALLEGRA) 180 MG tablet, Take 1 tablet by mouth daily Stop zyrtec, Disp: 30 tablet, Rfl: 5    EST ESTROGENS-METHYLTEST DS 1.25-2.5 MG TABS, Take 1 each by mouth daily. , Disp: 30 tablet, Rfl: 5    calcium carbonate-vitamin D (CALCIUM 600+D) 600-200 MG-UNIT TABS, Take 1 each by mouth 2 times daily, Disp: 60 tablet, Rfl: 11    pantoprazole (PROTONIX) 40 MG tablet, Take 1 tablet by mouth 2 times daily (before meals), Disp: 60 tablet, Rfl: 1    Cholecalciferol (VITAMIN D) 2000 units CAPS capsule, Take 1 capsule by mouth daily, Disp: 30 capsule, Rfl: 1    azelastine (ASTELIN) 0.1 % nasal spray, 1 spray by Nasal route 2 times daily Use in each nostril as directed, Disp: 1 Bottle, Rfl: 5    albuterol sulfate HFA (VENTOLIN HFA) 108 (90 Base) MCG/ACT inhaler, Inhale 2 puffs into the lungs every 6 hours as needed for Wheezing, Disp: 1 Inhaler, Rfl: 3  Allergies:  Fruit acid concentrate; Penicillins; Pollen extract; Tomato; Bee pollen; Fruit & vegetable daily  [cholestatin]; Fruit extracts; Influenza vac split quad; Meloxicam; Versed [midazolam]; and Influenza vaccines  Social History:    reports that she has never smoked. She has never used smokeless tobacco. She reports current alcohol use. She reports that she does not use drugs.   Family History:   Family History   Problem Relation Age of Onset    Heart Disease Mother     High Blood Pressure Mother     Diabetes Mother     Heart Disease Father     Stroke Father     High Blood Pressure Father     Heart Disease Paternal Grandmother        REVIEW OF SYSTEMS:   CONSTITUTIONAL: Denies unexplained weight loss, fevers, chills or fatigue  NEUROLOGICAL: Denies unsteady gait or progressive weakness  MUSCULOSKELETAL: Denies joint swelling or redness  GI: Denies nausea, vomiting, diarrhea   : Denies bowel or bladder issues       PHYSICAL EXAM:    Vitals: Blood pressure 106/71, pulse 65, height 5' 5.51\" (1.664 m), weight 160 lb 0.9 oz (72.6 kg), last menstrual period 01/13/1999, not currently breastfeeding. GENERAL EXAM:  · General Apparence: Patient is adequately groomed with no evidence of malnutrition. · Psychiatric: Orientation: The patient is oriented to time, place and person. The patient's mood and affect are appropriate   · Vascular: Examination reveals no swelling and palpation reveals no tenderness in upper or lower extremities. Good capillary refill. · The lymphatic examination of the neck, axillae and groin reveals all areas to be without enlargement or induration  · Sensation is intact without deficit in the upper and lower extremities to light touch and pinprick  · Coordination of the upper and lower extremities are normal.  · Additional Examinations:  · RIGHT UPPER EXTREMITY:  Inspection/examination of the right upper extremity does not show any tenderness, deformity or injury. Range of motion is unremarkable and pain-free. There is no gross instability. There are no rashes, ulcerations or lesions. Strength and tone are normal. No atrophy or abnormal movements are noted. · LEFT UPPER EXTREMITY: Inspection/examination of the left upper extremity does not show any tenderness, deformity or injury. Range of motion is unremarkable and pain-free. There is no gross instability. There are no rashes, ulcerations or lesions. Strength and tone are normal. No atrophy or abnormal movements are noted. LUMBAR/SACRAL EXAMINATION:  · Inspection: Local inspection shows no step-off or bruising. Lumbar alignment is normal. No instability is noted. · Palpation:   No evidence of tenderness at the midline. Lumbar paraspinal tenderness: Mild L4/5 and L5/S1 tenderness  Bursal tenderness No tenderness bilaterally  There is no paraspinal spasm. · Range of Motion: limited by 25% in all planes due to pain  · Strength:   Strength testing is 5/5 in all muscle groups tested. · Special Tests:   Straight leg raise and crossed SLR negative. · Skin: There are no rashes, ulcerations or lesions.   · Reflexes: Reflexes are symmetrically 2+ at the patellar and ankle tendons. Clonus absent bilaterally at the feet. · Gait & station: normal, patient ambulates without assistance and no ataxia  · Additional Examinations:  · RIGHT LOWER EXTREMITY: Inspection/examination of the right lower extremity does not show any tenderness, deformity or injury. Range of motion is normal and pain-free. There is no gross instability. There are no rashes, ulcerations or lesions. Strength and tone are normal. No atrophy or abnormal movements are noted. · LEFT LOWER EXTREMITY:  Inspection/examination of the left lower extremity does not show any tenderness, deformity or injury. Range of motion is normal and pain-free. There is no gross instability. There are no rashes, ulcerations or lesions. Strength and tone are normal. No atrophy or abnormal movements are noted. Diagnostic Testing:    No new diagnostics  Results for orders placed or performed during the hospital encounter of 02/24/20   CBC   Result Value Ref Range    WBC 3.6 (L) 4.0 - 11.0 K/uL    RBC 4.51 4.00 - 5.20 M/uL    Hemoglobin 14.0 12.0 - 16.0 g/dL    Hematocrit 42.1 36.0 - 48.0 %    MCV 93.2 80.0 - 100.0 fL    MCH 31.1 26.0 - 34.0 pg    MCHC 33.3 31.0 - 36.0 g/dL    RDW 13.1 12.4 - 15.4 %    Platelets 723 015 - 882 K/uL    MPV 8.2 5.0 - 10.5 fL   Protime-INR   Result Value Ref Range    Protime 11.3 10.0 - 13.2 sec    INR 0.97 0.86 - 1.14   APTT   Result Value Ref Range    aPTT 30.8 24.2 - 36.2 sec     Impression:       1. Lumbar post-laminectomy syndrome    2. Lumbar radiculopathy    3. Chronic pain syndrome    4. Spondylosis without myelopathy or radiculopathy, lumbar region    5. Paresthesia of right lower extremity        Plan:  Clinical Course: Above diagnoses are worsening    I discussed the diagnosis and the treatment options with [employer] today.      In Summary:  The various treatment options were outlined and discussed with Arachno including:  Conservative care possible that there are still dictated errors within this office note. If so, please bring any errors to my attention for an addendum. All efforts were made to ensure that this office note is accurate.

## 2020-03-18 ENCOUNTER — TELEPHONE (OUTPATIENT)
Dept: DERMATOLOGY | Age: 55
End: 2020-03-18

## 2020-06-21 ENCOUNTER — HOSPITAL ENCOUNTER (EMERGENCY)
Age: 55
Discharge: HOME | End: 2020-06-22
Payer: COMMERCIAL

## 2020-06-21 PROCEDURE — 36415 COLL VENOUS BLD VENIPUNCTURE: CPT

## 2020-06-21 PROCEDURE — 99284 EMERGENCY DEPT VISIT MOD MDM: CPT

## 2020-06-21 PROCEDURE — 80053 COMPREHEN METABOLIC PANEL: CPT

## 2020-06-21 PROCEDURE — 85025 COMPLETE CBC W/AUTO DIFF WBC: CPT

## 2020-06-21 PROCEDURE — 80074 ACUTE HEPATITIS PANEL: CPT

## 2020-06-21 PROCEDURE — 83690 ASSAY OF LIPASE: CPT

## 2020-06-21 RX ORDER — 0.9 % SODIUM CHLORIDE 0.9 %
1000 INTRAVENOUS SOLUTION INTRAVENOUS ONCE
Status: COMPLETED | OUTPATIENT
Start: 2020-06-21 | End: 2020-06-22

## 2020-06-21 ASSESSMENT — PAIN DESCRIPTION - FREQUENCY
FREQUENCY: CONTINUOUS
FREQUENCY: CONTINUOUS

## 2020-06-21 ASSESSMENT — PAIN DESCRIPTION - LOCATION
LOCATION: ABDOMEN
LOCATION: ABDOMEN

## 2020-06-21 ASSESSMENT — PAIN DESCRIPTION - DESCRIPTORS
DESCRIPTORS: DISCOMFORT
DESCRIPTORS: BURNING

## 2020-06-21 ASSESSMENT — PAIN DESCRIPTION - PROGRESSION: CLINICAL_PROGRESSION: NOT CHANGED

## 2020-06-21 ASSESSMENT — PAIN DESCRIPTION - ORIENTATION
ORIENTATION: MID
ORIENTATION: MID

## 2020-06-21 ASSESSMENT — PAIN - FUNCTIONAL ASSESSMENT
PAIN_FUNCTIONAL_ASSESSMENT: PREVENTS OR INTERFERES SOME ACTIVE ACTIVITIES AND ADLS
PAIN_FUNCTIONAL_ASSESSMENT: PREVENTS OR INTERFERES SOME ACTIVE ACTIVITIES AND ADLS

## 2020-06-21 ASSESSMENT — PAIN DESCRIPTION - ONSET: ONSET: AWAKENED FROM SLEEP

## 2020-06-21 ASSESSMENT — PAIN DESCRIPTION - PAIN TYPE
TYPE: ACUTE PAIN
TYPE: ACUTE PAIN

## 2020-06-21 ASSESSMENT — PAIN SCALES - GENERAL
PAINLEVEL_OUTOF10: 7
PAINLEVEL_OUTOF10: 7

## 2020-06-22 ENCOUNTER — APPOINTMENT (OUTPATIENT)
Dept: CT IMAGING | Age: 55
End: 2020-06-22
Payer: COMMERCIAL

## 2020-06-22 VITALS
WEIGHT: 164.9 LBS | DIASTOLIC BLOOD PRESSURE: 86 MMHG | OXYGEN SATURATION: 98 % | BODY MASS INDEX: 26.5 KG/M2 | SYSTOLIC BLOOD PRESSURE: 139 MMHG | HEIGHT: 66 IN | RESPIRATION RATE: 18 BRPM | HEART RATE: 76 BPM | TEMPERATURE: 98 F

## 2020-06-22 LAB
A/G RATIO: 2.4 (ref 1.1–2.2)
ALBUMIN SERPL-MCNC: 4.3 G/DL (ref 3.4–5)
ALP BLD-CCNC: 35 U/L (ref 40–129)
ALT SERPL-CCNC: 16 U/L (ref 10–40)
ANION GAP SERPL CALCULATED.3IONS-SCNC: 13 MMOL/L (ref 3–16)
AST SERPL-CCNC: 16 U/L (ref 15–37)
BASOPHILS ABSOLUTE: 0 K/UL (ref 0–0.2)
BASOPHILS RELATIVE PERCENT: 0.4 %
BILIRUB SERPL-MCNC: 0.8 MG/DL (ref 0–1)
BUN BLDV-MCNC: 13 MG/DL (ref 7–20)
CALCIUM SERPL-MCNC: 8.4 MG/DL (ref 8.3–10.6)
CHLORIDE BLD-SCNC: 108 MMOL/L (ref 99–110)
CO2: 21 MMOL/L (ref 21–32)
CREAT SERPL-MCNC: 0.6 MG/DL (ref 0.6–1.1)
EOSINOPHILS ABSOLUTE: 0.1 K/UL (ref 0–0.6)
EOSINOPHILS RELATIVE PERCENT: 1.8 %
GFR AFRICAN AMERICAN: >60
GFR NON-AFRICAN AMERICAN: >60
GLOBULIN: 1.8 G/DL
GLUCOSE BLD-MCNC: 87 MG/DL (ref 70–99)
HAV IGM SER IA-ACNC: NORMAL
HCT VFR BLD CALC: 40.1 % (ref 36–48)
HEMOGLOBIN: 13.4 G/DL (ref 12–16)
HEPATITIS B CORE IGM ANTIBODY: NORMAL
HEPATITIS B SURFACE ANTIGEN INTERPRETATION: NORMAL
HEPATITIS C ANTIBODY INTERPRETATION: NORMAL
LIPASE: 37 U/L (ref 13–60)
LYMPHOCYTES ABSOLUTE: 2.1 K/UL (ref 1–5.1)
LYMPHOCYTES RELATIVE PERCENT: 56.9 %
MCH RBC QN AUTO: 31.4 PG (ref 26–34)
MCHC RBC AUTO-ENTMCNC: 33.4 G/DL (ref 31–36)
MCV RBC AUTO: 93.9 FL (ref 80–100)
MONOCYTES ABSOLUTE: 0.2 K/UL (ref 0–1.3)
MONOCYTES RELATIVE PERCENT: 6.4 %
NEUTROPHILS ABSOLUTE: 1.2 K/UL (ref 1.7–7.7)
NEUTROPHILS RELATIVE PERCENT: 34.5 %
PDW BLD-RTO: 13.3 % (ref 12.4–15.4)
PLATELET # BLD: 210 K/UL (ref 135–450)
PMV BLD AUTO: 8 FL (ref 5–10.5)
POTASSIUM REFLEX MAGNESIUM: 3.7 MMOL/L (ref 3.5–5.1)
RBC # BLD: 4.27 M/UL (ref 4–5.2)
SODIUM BLD-SCNC: 142 MMOL/L (ref 136–145)
TOTAL PROTEIN: 6.1 G/DL (ref 6.4–8.2)
WBC # BLD: 3.6 K/UL (ref 4–11)

## 2020-06-22 PROCEDURE — 96375 TX/PRO/DX INJ NEW DRUG ADDON: CPT

## 2020-06-22 PROCEDURE — 96374 THER/PROPH/DIAG INJ IV PUSH: CPT

## 2020-06-22 PROCEDURE — 74177 CT ABD & PELVIS W/CONTRAST: CPT

## 2020-06-22 PROCEDURE — 6360000004 HC RX CONTRAST MEDICATION: Performed by: NURSE PRACTITIONER

## 2020-06-22 PROCEDURE — 2500000003 HC RX 250 WO HCPCS: Performed by: NURSE PRACTITIONER

## 2020-06-22 PROCEDURE — 2580000003 HC RX 258: Performed by: NURSE PRACTITIONER

## 2020-06-22 PROCEDURE — 6370000000 HC RX 637 (ALT 250 FOR IP): Performed by: NURSE PRACTITIONER

## 2020-06-22 PROCEDURE — 6360000002 HC RX W HCPCS: Performed by: NURSE PRACTITIONER

## 2020-06-22 RX ORDER — ONDANSETRON 2 MG/ML
4 INJECTION INTRAMUSCULAR; INTRAVENOUS ONCE
Status: COMPLETED | OUTPATIENT
Start: 2020-06-22 | End: 2020-06-22

## 2020-06-22 RX ORDER — ONDANSETRON 4 MG/1
4-8 TABLET, FILM COATED ORAL EVERY 12 HOURS PRN
Qty: 30 TABLET | Refills: 0 | Status: SHIPPED | OUTPATIENT
Start: 2020-06-22

## 2020-06-22 RX ORDER — MORPHINE SULFATE 4 MG/ML
4 INJECTION, SOLUTION INTRAMUSCULAR; INTRAVENOUS ONCE
Status: COMPLETED | OUTPATIENT
Start: 2020-06-22 | End: 2020-06-22

## 2020-06-22 RX ORDER — SUCRALFATE 1 G/1
1 TABLET ORAL 4 TIMES DAILY
Qty: 60 TABLET | Refills: 0 | Status: SHIPPED | OUTPATIENT
Start: 2020-06-22 | End: 2020-08-03 | Stop reason: SDUPTHER

## 2020-06-22 RX ORDER — PANTOPRAZOLE SODIUM 20 MG/1
40 TABLET, DELAYED RELEASE ORAL DAILY
Qty: 30 TABLET | Refills: 0 | Status: SHIPPED | OUTPATIENT
Start: 2020-06-22 | End: 2020-08-03

## 2020-06-22 RX ADMIN — IOPAMIDOL 75 ML: 755 INJECTION, SOLUTION INTRAVENOUS at 01:50

## 2020-06-22 RX ADMIN — ONDANSETRON 4 MG: 2 INJECTION INTRAMUSCULAR; INTRAVENOUS at 01:58

## 2020-06-22 RX ADMIN — FAMOTIDINE 20 MG: 10 INJECTION, SOLUTION INTRAVENOUS at 00:09

## 2020-06-22 RX ADMIN — LIDOCAINE HYDROCHLORIDE: 20 SOLUTION ORAL; TOPICAL at 00:09

## 2020-06-22 RX ADMIN — SODIUM CHLORIDE 1000 ML: 9 INJECTION, SOLUTION INTRAVENOUS at 00:09

## 2020-06-22 RX ADMIN — MORPHINE SULFATE 4 MG: 4 INJECTION, SOLUTION INTRAMUSCULAR; INTRAVENOUS at 01:59

## 2020-06-22 ASSESSMENT — PAIN - FUNCTIONAL ASSESSMENT: PAIN_FUNCTIONAL_ASSESSMENT: PREVENTS OR INTERFERES SOME ACTIVE ACTIVITIES AND ADLS

## 2020-06-22 ASSESSMENT — ENCOUNTER SYMPTOMS
SHORTNESS OF BREATH: 0
ABDOMINAL PAIN: 1
COUGH: 0
ABDOMINAL DISTENTION: 0
CONSTIPATION: 0
VOMITING: 0
DIARRHEA: 0
BLOOD IN STOOL: 0
BACK PAIN: 0
COLOR CHANGE: 0
NAUSEA: 1

## 2020-06-22 ASSESSMENT — PAIN SCALES - GENERAL
PAINLEVEL_OUTOF10: 0
PAINLEVEL_OUTOF10: 7
PAINLEVEL_OUTOF10: 0

## 2020-06-22 ASSESSMENT — PAIN DESCRIPTION - PAIN TYPE: TYPE: ACUTE PAIN

## 2020-06-22 ASSESSMENT — PAIN DESCRIPTION - FREQUENCY: FREQUENCY: CONTINUOUS

## 2020-06-22 ASSESSMENT — PAIN DESCRIPTION - ONSET: ONSET: ON-GOING

## 2020-06-22 ASSESSMENT — PAIN DESCRIPTION - DESCRIPTORS: DESCRIPTORS: DISCOMFORT

## 2020-06-22 ASSESSMENT — PAIN DESCRIPTION - PROGRESSION
CLINICAL_PROGRESSION: RESOLVED
CLINICAL_PROGRESSION: NOT CHANGED

## 2020-06-22 NOTE — ED PROVIDER NOTES
chest pain. Gastrointestinal: Positive for abdominal pain and nausea. Negative for abdominal distention, blood in stool, constipation, diarrhea and vomiting. Genitourinary: Negative for dysuria, flank pain, frequency and hematuria. Musculoskeletal: Negative for back pain. Skin: Negative for color change and rash. Allergic/Immunologic: Negative for immunocompromised state. Neurological: Negative for dizziness and headaches. Hematological: Negative for adenopathy. Psychiatric/Behavioral: Negative for confusion. All other systems reviewed and are negative. Positives and Pertinent negatives as per HPI. Except as noted above in the ROS, problem specific ROS was completed and is negative. Physical Exam:  Physical Exam  Vitals signs and nursing note reviewed. Constitutional:       General: She is not in acute distress. Appearance: Normal appearance. She is well-developed. She is not toxic-appearing. HENT:      Head: Normocephalic and atraumatic. Eyes:      General: No scleral icterus. Conjunctiva/sclera: Conjunctivae normal.   Neck:      Musculoskeletal: Normal range of motion. Vascular: No JVD. Cardiovascular:      Rate and Rhythm: Normal rate and regular rhythm. Heart sounds: No murmur. No friction rub. No gallop. Pulmonary:      Effort: Pulmonary effort is normal. No respiratory distress. Breath sounds: Normal breath sounds. Abdominal:      General: Abdomen is flat. Bowel sounds are normal. There is no distension. Palpations: Abdomen is soft. Abdomen is not rigid. Tenderness: There is abdominal tenderness (very mild). There is no guarding or rebound. Musculoskeletal: Normal range of motion. Skin:     General: Skin is warm and dry. Findings: No rash. Neurological:      General: No focal deficit present. Mental Status: She is alert and oriented to person, place, and time.    Psychiatric:         Mood and Affect: Mood normal. results found. MEDICAL DECISION MAKING / ED COURSE:      PROCEDURES:   Procedures    None    Patient was given:  Medications   0.9 % sodium chloride bolus (0 mLs Intravenous Stopped 6/22/20 0209)   aluminum & magnesium hydroxide-simethicone (MAALOX) 30 mL, lidocaine viscous hcl (XYLOCAINE) 5 mL (GI COCKTAIL) ( Oral Given 6/22/20 0009)   famotidine (PEPCID) injection 20 mg (20 mg Intravenous Given 6/22/20 0009)   morphine (PF) injection 4 mg (4 mg Intravenous Given 6/22/20 0159)   ondansetron (ZOFRAN) injection 4 mg (4 mg Intravenous Given 6/22/20 0158)   iopamidol (ISOVUE-370) 76 % injection 75 mL (75 mLs Intravenous Given 6/22/20 0150)       Differential diagnosis: Abdominal Aortic Aneurysm, Acute Coronary Syndrome, Ischemic Bowel, Bowel Obstruction (including Gastric Outlet Obstruction), PUD, GERD, Acute Cholecystitis, Pancreatitis, Hepatitis, Colitis, SMA Syndrome, Mesenteric Steal Syndrome, Splanchnic Vein Thrombosis, other    Patient seen and examined today for epigastric pain. See HPI for patient presentation. Patient is hemodynamically stable, nontoxic, afebrile, and without tachycardia, tachypnea, and hypoxia. Physical exam as above. 49year-old lying in bed in no acute distress. Very mild epigastric tenderness. Abdomen soft without rigidity guarding or peritoneal signs. Lungs CTA bilaterally and cardiac exam is normal.  CBC and chemistry grossly unremarkable. Lipase normal.  CT abdomen pelvis shows periportal edema and possible gastritis vs PUD. Emergency department course included a GI cocktail, Pepcid, fluids, Zofran, and morphine. Patient resting comfortably. Abdomen is nonsurgical.  She is not febrile or tachycardic. I have no suspicion for ACS or PE. She be started on Carafate and Prilosec and given a referral to GI with strict return precautions. At this time, the evidence for any other entities in the differential is insufficient to justify any further testing.   This was explained to the patient. The patient was advised that persistent or worsening symptoms will require further evaluation. I discussed with Александр Vanegas and/or family the exam results, diagnosis, care, prognosis, reasons to return and the importance of follow up. Patient and/or family is in full agreement with plan and all questions have been answered. Specific discharge instructions explained, including reasons to return to the emergency department. Александр Vanegas is well appearing, non-toxic, and afebrile at the time of discharge. Patient was instructed to follow up with primary care provider in 24-48 hours, and to instructed to return to ED immediately for any new or worsening concerns. Александр Vanegas verbalized understanding and discharged home. The patient tolerated their visit well. I evaluated the patient. The physician was available for consultation as needed. The patient and / or the family were informed of the results of any tests, a time was given to answer questions, a plan was proposed and they agreed with plan. CLINICAL IMPRESSION:  1. Abdominal pain, epigastric        DISPOSITION Decision To Discharge 06/22/2020 02:14:52 AM      PATIENT REFERRED TO:  Mariano Maldonado MD  [address] [facility]  [address]  [address] [facility]    Schedule an appointment as soon as possible for a visit       [address] [facility]  [phone]    Schedule an appointment as soon as possible for a visit       [facility] Emergency Department  [address] [address] [phone]  Go to   As needed      DISCHARGE MEDICATIONS:  New Prescriptions    ONDANSETRON (ZOFRAN) 4 MG TABLET    Take 1-2 tablets by mouth every 12 hours as needed for Nausea    PANTOPRAZOLE (PROTONIX) 20 MG TABLET    Take 2 tablets by mouth daily    SUCRALFATE (CARAFATE) 1 GM TABLET    Take 1 tablet by mouth 4 times daily       DISCONTINUED

## 2020-06-22 NOTE — ED NOTES
Per triage report patient c/o worsening mid abdominal pain since last wednesday. + nausea, no vomiting. diarrhea resolved. went to PMD.  tested NEG for COVID.  no improvements in pain despite medications. called PMD.  instructed to come to ED for evaluation.      Chana Damon RN  06/21/20 0094

## 2020-07-09 RX ORDER — ESTERIFIED ESTROGENS AND METHYLTESTOSTERONE 1.25; 2.5 MG/1; MG/1
TABLET, FILM COATED ORAL
Qty: 30 TABLET | Refills: 2 | Status: SHIPPED | OUTPATIENT
Start: 2020-07-09 | End: 2020-10-13

## 2020-07-23 RX ORDER — CLOTRIMAZOLE 10 MG/1
10 LOZENGE ORAL; TOPICAL
Qty: 50 TABLET | Refills: 0 | Status: SHIPPED | OUTPATIENT
Start: 2020-07-23 | End: 2020-08-02

## 2020-07-24 PROBLEM — M48.061 NEURAL FORAMINAL STENOSIS OF LUMBAR SPINE: Status: ACTIVE | Noted: 2017-09-26

## 2020-07-24 PROBLEM — M54.17 RADICULOPATHY, LUMBOSACRAL REGION: Status: ACTIVE | Noted: 2017-07-27

## 2020-07-24 PROBLEM — M48.061 SPINAL STENOSIS OF LUMBAR REGION: Status: ACTIVE | Noted: 2017-07-27

## 2020-07-24 PROBLEM — M43.10 ACQUIRED SPONDYLOLISTHESIS: Status: ACTIVE | Noted: 2017-09-26

## 2020-07-24 PROBLEM — M46.1 SACROILIITIS (HCC): Status: ACTIVE | Noted: 2018-03-29

## 2020-07-24 RX ORDER — FLUCONAZOLE 150 MG/1
150 TABLET ORAL ONCE
Qty: 1 TABLET | Refills: 0 | Status: SHIPPED | OUTPATIENT
Start: 2020-07-24 | End: 2020-07-24

## 2020-08-03 ENCOUNTER — OFFICE VISIT (OUTPATIENT)
Dept: PRIMARY CARE CLINIC | Age: 55
End: 2020-08-03
Payer: COMMERCIAL

## 2020-08-03 VITALS
BODY MASS INDEX: 26.36 KG/M2 | OXYGEN SATURATION: 99 % | HEART RATE: 69 BPM | SYSTOLIC BLOOD PRESSURE: 116 MMHG | TEMPERATURE: 98.4 F | DIASTOLIC BLOOD PRESSURE: 78 MMHG | WEIGHT: 164 LBS | HEIGHT: 66 IN

## 2020-08-03 PROCEDURE — 99214 OFFICE O/P EST MOD 30 MIN: CPT | Performed by: INTERNAL MEDICINE

## 2020-08-03 RX ORDER — LANSOPRAZOLE 30 MG/1
CAPSULE, DELAYED RELEASE ORAL
Qty: 30 CAPSULE | Refills: 5 | Status: SHIPPED | OUTPATIENT
Start: 2020-08-03 | End: 2021-07-01 | Stop reason: SDUPTHER

## 2020-08-03 RX ORDER — IBUPROFEN 200 MG
1 CAPSULE ORAL 2 TIMES DAILY
Qty: 60 TABLET | Refills: 11 | Status: SHIPPED | OUTPATIENT
Start: 2020-08-03 | End: 2022-07-08 | Stop reason: SDUPTHER

## 2020-08-03 RX ORDER — FEXOFENADINE HCL 180 MG/1
180 TABLET ORAL DAILY
Qty: 30 TABLET | Refills: 5 | Status: SHIPPED | OUTPATIENT
Start: 2020-08-03 | End: 2021-07-01 | Stop reason: SDUPTHER

## 2020-08-03 RX ORDER — TOPIRAMATE 100 MG/1
100 TABLET, FILM COATED ORAL 2 TIMES DAILY
Qty: 60 TABLET | Refills: 5 | Status: SHIPPED | OUTPATIENT
Start: 2020-08-03 | End: 2020-08-27

## 2020-08-03 RX ORDER — BACLOFEN 5 MG/1
1 TABLET ORAL 3 TIMES DAILY PRN
Qty: 90 TABLET | Refills: 5 | Status: SHIPPED | OUTPATIENT
Start: 2020-08-03 | End: 2020-12-31 | Stop reason: SDUPTHER

## 2020-08-03 RX ORDER — SUCRALFATE 1 G/1
1 TABLET ORAL 4 TIMES DAILY
Qty: 60 TABLET | Refills: 5 | Status: SHIPPED | OUTPATIENT
Start: 2020-08-03 | End: 2022-04-07

## 2020-08-03 RX ORDER — ALBUTEROL SULFATE 90 UG/1
2 AEROSOL, METERED RESPIRATORY (INHALATION) EVERY 6 HOURS PRN
Qty: 1 INHALER | Refills: 3 | Status: SHIPPED | OUTPATIENT
Start: 2020-08-03

## 2020-08-03 ASSESSMENT — ENCOUNTER SYMPTOMS
SINUS PRESSURE: 0
EYE REDNESS: 0
EYE PAIN: 0
SORE THROAT: 0
RECTAL PAIN: 0
NAUSEA: 0
BACK PAIN: 1
COUGH: 0
VOMITING: 0
PHOTOPHOBIA: 1
ALLERGIC/IMMUNOLOGIC NEGATIVE: 1
ANAL BLEEDING: 0
CONSTIPATION: 0
ABDOMINAL PAIN: 0
SHORTNESS OF BREATH: 0
RHINORRHEA: 0
BLOOD IN STOOL: 0
ABDOMINAL DISTENTION: 0

## 2020-08-03 NOTE — LETTER
[facility]  [address] [id number]  Phone: [phone]  Fax: [phone]    Kena Richmond MD        August 3, 2020     Patient: Uma Hernandez   YOB: 1965   Date of Visit: 8/3/2020       To Whom It May Concern: It is my medical opinion that Uma Hernandez should work from home 4 days per week as a reasonable accomodation. Recently caring heavy equipment daily to job has exacerbated the pain from the degenerative disc disease in the lower back. At home she has an ergonomically correct chair and no need to more the computer. Chronic symptoms were better controlled with this work situation. Please allow this accomodation. .    If you have any questions or concerns, please don't hesitate to call.     Sincerely,          Kena Richmond MD

## 2020-08-03 NOTE — PROGRESS NOTES
8/3/2020     Funmilayo Mauricio (:  1965) is a 49 y.o. female, here for evaluation of the following medical concerns:    HPI     Recurrent thrush for past six months. Negative HIV. Lab Results   Component Value Date    LABA1C 5.4 2019    LABA1C 5.3 2018    LABA1C 5.4 2017     Lab Results   Component Value Date    LABMICR Not Indicated 2019    LDLCALC 104 (H) 2017    CREATININE 0.6 2020     No diabetes. Patient is not taking any medications that would cause immunocompromise. Symptoms improved with treatment and return a  Few weeks to months later. She has good oral hygiene. Patient wants to know why she has recurrent infections. Low back pain exacerbation after returned to work and carries heavy computer equipment qd. Needs reasonable accomodation letter. Patient does well when she works at home in her ergonomically compatible chair and does not need to carry any heavy equipment at home. I will request that patient work from home. Patient had lost weight and is now regaining despite following diet. . No swelling of arms or legs or abdomen. No heat or cold intolerance. Wt Readings from Last 3 Encounters:   20 164 lb (74.4 kg)   20 164 lb 14.5 oz (74.8 kg)   20 159 lb (72.1 kg)     Patient wants to know what to do to start losing weight again. She is walking daily.           Patient Active Problem List   Diagnosis    Other fatigue    Allergic rhinitis    Vitamin D deficiency    Insomnia    Hyperlipidemia    Neck pain    Constipation    Lumbar stenosis    Candida esophagitis (HCC)    Weight gain    Lumbar radiculopathy    Spondylolisthesis of lumbar region    HNP (herniated nucleus pulposus), lumbar    Muscle spasm    Acute non-recurrent maxillary sinusitis    Skin hypopigmentation    Eczema    Melanonychia striata    Neural foraminal stenosis of lumbar spine    Sacroiliitis (HCC)    Symptomatic menopausal or female climacteric states    Radiculopathy, lumbosacral region    Spinal stenosis of lumbar region    Acquired spondylolisthesis     Allergies   Allergen Reactions    Fruit Acid Concentrate      Tomato, strawberries    Penicillins Hives    Pollen Extract     Tomato Anaphylaxis     Vomiting  Vomiting    Bee Pollen     Fruit & Vegetable Daily  [Cholestatin]     Fruit Extracts     Influenza Vac Split Quad     Meloxicam      Rapid weight gain    Versed [Midazolam] Other (See Comments)     Extremely sleepy with a dose of 2 mg. Reversal used To wake patient up. She states this happened at her colonoscopy the week before also.  Influenza Vaccines Rash     Hives with vaccine. Review of Systems   Constitutional: Negative for chills, diaphoresis, fatigue and fever. HENT: Negative for congestion, ear pain, postnasal drip, rhinorrhea, sinus pressure and sore throat. Eyes: Positive for photophobia. Negative for pain and redness. Respiratory: Negative for cough and shortness of breath. Cardiovascular: Negative. Negative for chest pain. Gastrointestinal: Negative for abdominal distention, abdominal pain, anal bleeding, blood in stool, constipation, nausea, rectal pain and vomiting. Endocrine: Negative. Genitourinary: Negative. Negative for dysuria and pelvic pain. Musculoskeletal: Positive for back pain. Negative for joint swelling, myalgias and neck pain. Low back pain   Skin: Negative. Finger nail thickening. Allergic/Immunologic: Negative. Neurological: Negative for seizures, weakness, numbness and headaches. Lumbar laminectomy November of 2017 at [facility] with resolution of spinal claudication. Improved with epidural   Hematological: Negative. Psychiatric/Behavioral: Negative. Prior to Visit Medications    Medication Sig Taking?  Authorizing Provider   Est Estrogens-Methyltest DS 1.25-2.5 MG TABS Take 1 tablet by mouth once daily Yes Antony Tirado MD pantoprazole (PROTONIX) 20 MG tablet Take 2 tablets by mouth daily Yes HANS Metzger CNP   sucralfate (CARAFATE) 1 GM tablet Take 1 tablet by mouth 4 times daily Yes HANS Metzger CNP   ondansetron (ZOFRAN) 4 MG tablet Take 1-2 tablets by mouth every 12 hours as needed for Nausea Yes HANS Metzger CNP   Cholecalciferol (VITAMIN D) 50 MCG (2000 UT) CAPS capsule Take 1 capsule by mouth once daily Yes Dewight Cockayne, MD   triamcinolone (KENALOG) 0.05 % OINT ointment Apply topically 2 times daily To back of the neck Yes Dewight Cockayne, MD   lansoprazole (PREVACID) 30 MG delayed release capsule Take 1 capsule by mouth once daily Yes Dewight Cockayne, MD   Baclofen 5 MG TABS Take by mouth Yes Vargas Driscoll MD   topiramate (TOPAMAX) 100 MG tablet Take 1 tablet by mouth 2 times daily Yes Dewight Cockayne, MD   tacrolimus (PROTOPIC) 0.1 % ointment Apply to affected area BID Yes Brad Mckinney MD   fexofenadine (ALLEGRA) 180 MG tablet Take 1 tablet by mouth daily Stop zyrtec Yes Dewight Cockayne, MD   calcium carbonate-vitamin D (CALCIUM 600+D) 600-200 MG-UNIT TABS Take 1 each by mouth 2 times daily Yes Dewight Cockayne, MD   azelastine (ASTELIN) 0.1 % nasal spray 1 spray by Nasal route 2 times daily Use in each nostril as directed Yes Dewight Cockayne, MD   albuterol sulfate HFA (VENTOLIN HFA) 108 (90 Base) MCG/ACT inhaler Inhale 2 puffs into the lungs every 6 hours as needed for Wheezing Yes Dewight Cockayne, MD        Social History     Tobacco Use    Smoking status: Never Smoker    Smokeless tobacco: Never Used   Substance Use Topics    Alcohol use: Yes     Comment: 3-4 times a year        Vitals:    08/03/20 1148   BP: 116/78   Pulse: 69   Temp: 98.4 °F (36.9 °C)   TempSrc: Oral   SpO2: 99%   Weight: 164 lb (74.4 kg)   Height: 5' 6\" (1.676 m)     Estimated body mass index is 26.47 kg/m² as calculated from the following:    Height as tablet    lansoprazole (PREVACID) 30 MG delayed release capsule   6. Family history of type 2 diabetes mellitus in mother , will screen patient. Hemoglobin A1C   7. Thrush recurrent. Start a probiotic daily. Monitor cbc. CBC Auto Differential     An electronic signature was used to authenticate this note.     --Mellissa Gordillo MD on 8/3/2020 at 12:37 PM

## 2020-08-09 PROBLEM — H65.00 ACUTE SEROUS OTITIS MEDIA: Status: ACTIVE | Noted: 2020-08-09

## 2020-08-09 PROBLEM — Z83.3 FAMILY HISTORY OF TYPE 2 DIABETES MELLITUS IN MOTHER: Status: ACTIVE | Noted: 2020-08-09

## 2020-08-09 PROBLEM — B37.0 THRUSH: Status: ACTIVE | Noted: 2020-08-09

## 2020-08-09 PROBLEM — J45.20 MILD INTERMITTENT ASTHMA WITHOUT COMPLICATION: Status: ACTIVE | Noted: 2020-08-09

## 2020-08-09 PROBLEM — K21.00 GASTROESOPHAGEAL REFLUX DISEASE WITH ESOPHAGITIS: Status: ACTIVE | Noted: 2020-08-09

## 2020-08-09 ASSESSMENT — PATIENT HEALTH QUESTIONNAIRE - PHQ9
2. FEELING DOWN, DEPRESSED OR HOPELESS: 0
SUM OF ALL RESPONSES TO PHQ9 QUESTIONS 1 & 2: 0
SUM OF ALL RESPONSES TO PHQ QUESTIONS 1-9: 0
SUM OF ALL RESPONSES TO PHQ QUESTIONS 1-9: 0
1. LITTLE INTEREST OR PLEASURE IN DOING THINGS: 0

## 2020-08-28 ENCOUNTER — TELEPHONE (OUTPATIENT)
Dept: PRIMARY CARE CLINIC | Age: 55
End: 2020-08-28

## 2020-09-01 ENCOUNTER — TELEPHONE (OUTPATIENT)
Dept: PRIMARY CARE CLINIC | Age: 55
End: 2020-09-01

## 2020-09-01 NOTE — TELEPHONE ENCOUNTER
Spoke with pt' and advised her the number is not working and she provided me with an email to send [email or web address]

## 2020-09-16 ENCOUNTER — TELEPHONE (OUTPATIENT)
Dept: ORTHOPEDIC SURGERY | Age: 55
End: 2020-09-16

## 2020-09-16 NOTE — TELEPHONE ENCOUNTER
PA for Lansoprazole 30MG dr capsules submitted via phone call to [facility]. Spoke to [facility]. who completed the PA. APPROVED today from 08/17/2020 through 09/16/2021. Will scan approval letter when received. Please advise patient. Thank you.

## 2020-10-01 ENCOUNTER — TELEPHONE (OUTPATIENT)
Dept: PRIMARY CARE CLINIC | Age: 55
End: 2020-10-01

## 2020-10-01 NOTE — TELEPHONE ENCOUNTER
----- Message from TSO3 sent at 9/30/2020 12:31 PM EDT -----  Subject: Appointment Request    Reason for Call: Routine Existing Condition Follow Up    QUESTIONS  Type of Appointment? Established Patient  Reason for appointment request? No appointments available during search  Additional Information for Provider? pt is trying to reschedule her f/u on   10/5 and no appt's are available for the reschedule. She will be out of   town. Would like to schedule for the following friday or Monday.   ---------------------------------------------------------------------------  --------------  CALL BACK INFO  What is the best way for the office to contact you? OK to leave message on   voicemail  Preferred Call Back Phone Number? [phone]  ---------------------------------------------------------------------------  --------------  SCRIPT ANSWERS  Relationship to Patient? Self  Have your symptoms changed? No  Appointment reason? Well Care/Follow Ups  Select a Well Care/Follow Ups appointment reason? Adult Existing Condition   Follow Up [Diabetes   CHF   COPD   Hypertension/Blood Pressure Check]  (Is the patient requesting to be seen urgently for their symptoms?)? No  Is this follow up request related to routine Diabetes Management? No  Are you having any new concerns about your existing condition? No  Have you been diagnosed with   tested for   or told that you are suspected of having COVID-19 (Coronavirus)? No  Have you had a fever or taken medication to treat a fever within the past   3 days? No  Have you had a cough   shortness of breath or flu-like symptoms within the past 3 days? No  Do you currently have flu-like symptoms including fever or chills   cough   shortness of breath   or difficulty breathing   or new loss of taste or smell? No  (Service Expert  click yes below to proceed with [employer] As Usual   Scheduling)?  Yes

## 2020-10-12 NOTE — TELEPHONE ENCOUNTER
Medication:   Requested Prescriptions     Pending Prescriptions Disp Refills    Est Estrogens-Methyltest DS 1.25-2.5 MG TABS [Pharmacy Med Name: Est Estrogens-Methyltest DS 1.25-2.5 MG Oral Tablet] 30 tablet 0     Sig: Take 1 tablet by mouth once daily        Last Filled:      Patient Phone Number: [phone] (home)     Last appt: 8/3/2020   Next appt: 10/14/2020    Last OARRS: No flowsheet data found.

## 2020-10-13 RX ORDER — ESTERIFIED ESTROGENS AND METHYLTESTOSTERONE 1.25; 2.5 MG/1; MG/1
TABLET, FILM COATED ORAL
Qty: 30 TABLET | Refills: 0 | Status: SHIPPED | OUTPATIENT
Start: 2020-10-13 | End: 2020-10-14 | Stop reason: SDUPTHER

## 2020-10-14 ENCOUNTER — TELEPHONE (OUTPATIENT)
Dept: ADMINISTRATIVE | Age: 55
End: 2020-10-14

## 2020-10-14 ENCOUNTER — OFFICE VISIT (OUTPATIENT)
Dept: PRIMARY CARE CLINIC | Age: 55
End: 2020-10-14
Payer: COMMERCIAL

## 2020-10-14 VITALS
DIASTOLIC BLOOD PRESSURE: 73 MMHG | TEMPERATURE: 97.2 F | BODY MASS INDEX: 26.03 KG/M2 | OXYGEN SATURATION: 99 % | HEART RATE: 87 BPM | HEIGHT: 66 IN | WEIGHT: 162 LBS | SYSTOLIC BLOOD PRESSURE: 110 MMHG

## 2020-10-14 DIAGNOSIS — Z13.220 SCREENING CHOLESTEROL LEVEL: ICD-10-CM

## 2020-10-14 DIAGNOSIS — R53.83 OTHER FATIGUE: ICD-10-CM

## 2020-10-14 DIAGNOSIS — M62.838 MUSCLE SPASM: ICD-10-CM

## 2020-10-14 DIAGNOSIS — Z13.1 DIABETES MELLITUS SCREENING: ICD-10-CM

## 2020-10-14 DIAGNOSIS — E55.9 VITAMIN D DEFICIENCY: ICD-10-CM

## 2020-10-14 LAB
A/G RATIO: 2.7 (ref 1.1–2.2)
ALBUMIN SERPL-MCNC: 5.1 G/DL (ref 3.4–5)
ALP BLD-CCNC: 49 U/L (ref 40–129)
ALT SERPL-CCNC: 21 U/L (ref 10–40)
ANION GAP SERPL CALCULATED.3IONS-SCNC: 11 MMOL/L (ref 3–16)
AST SERPL-CCNC: 18 U/L (ref 15–37)
BASOPHILS ABSOLUTE: 0 K/UL (ref 0–0.2)
BASOPHILS RELATIVE PERCENT: 0.4 %
BILIRUB SERPL-MCNC: 1.5 MG/DL (ref 0–1)
BILIRUBIN URINE: NEGATIVE
BLOOD, URINE: NEGATIVE
BUN BLDV-MCNC: 15 MG/DL (ref 7–20)
CALCIUM SERPL-MCNC: 9.7 MG/DL (ref 8.3–10.6)
CHLORIDE BLD-SCNC: 106 MMOL/L (ref 99–110)
CHOLESTEROL, TOTAL: 212 MG/DL (ref 0–199)
CLARITY: CLEAR
CO2: 24 MMOL/L (ref 21–32)
COLOR: YELLOW
CREAT SERPL-MCNC: <0.5 MG/DL (ref 0.6–1.1)
EOSINOPHILS ABSOLUTE: 0 K/UL (ref 0–0.6)
EOSINOPHILS RELATIVE PERCENT: 1 %
GFR AFRICAN AMERICAN: >60
GFR NON-AFRICAN AMERICAN: >60
GLOBULIN: 1.9 G/DL
GLUCOSE BLD-MCNC: 96 MG/DL (ref 70–99)
GLUCOSE URINE: NEGATIVE MG/DL
HCT VFR BLD CALC: 46.3 % (ref 36–48)
HDLC SERPL-MCNC: 77 MG/DL (ref 40–60)
HEMOGLOBIN: 15.4 G/DL (ref 12–16)
KETONES, URINE: ABNORMAL MG/DL
LDL CHOLESTEROL CALCULATED: 122 MG/DL
LEUKOCYTE ESTERASE, URINE: NEGATIVE
LYMPHOCYTES ABSOLUTE: 1.4 K/UL (ref 1–5.1)
LYMPHOCYTES RELATIVE PERCENT: 35.9 %
MCH RBC QN AUTO: 30.7 PG (ref 26–34)
MCHC RBC AUTO-ENTMCNC: 33.3 G/DL (ref 31–36)
MCV RBC AUTO: 92.2 FL (ref 80–100)
MICROSCOPIC EXAMINATION: ABNORMAL
MONOCYTES ABSOLUTE: 0.3 K/UL (ref 0–1.3)
MONOCYTES RELATIVE PERCENT: 6.6 %
NEUTROPHILS ABSOLUTE: 2.1 K/UL (ref 1.7–7.7)
NEUTROPHILS RELATIVE PERCENT: 56.1 %
NITRITE, URINE: NEGATIVE
PDW BLD-RTO: 13.2 % (ref 12.4–15.4)
PH UA: 6 (ref 5–8)
PHOSPHORUS: 2.6 MG/DL (ref 2.5–4.9)
PLATELET # BLD: 218 K/UL (ref 135–450)
PMV BLD AUTO: 8.6 FL (ref 5–10.5)
POTASSIUM SERPL-SCNC: 4 MMOL/L (ref 3.5–5.1)
PROTEIN UA: NEGATIVE MG/DL
RBC # BLD: 5.02 M/UL (ref 4–5.2)
SODIUM BLD-SCNC: 141 MMOL/L (ref 136–145)
SPECIFIC GRAVITY UA: 1.02 (ref 1–1.03)
TOTAL PROTEIN: 7 G/DL (ref 6.4–8.2)
TRIGL SERPL-MCNC: 63 MG/DL (ref 0–150)
TSH REFLEX FT4: 1.66 UIU/ML (ref 0.27–4.2)
URINE TYPE: ABNORMAL
UROBILINOGEN, URINE: 1 E.U./DL
VITAMIN B-12: 820 PG/ML (ref 211–911)
VITAMIN D 25-HYDROXY: 42.5 NG/ML
VLDLC SERPL CALC-MCNC: 13 MG/DL
WBC # BLD: 3.8 K/UL (ref 4–11)

## 2020-10-14 PROCEDURE — 99214 OFFICE O/P EST MOD 30 MIN: CPT | Performed by: INTERNAL MEDICINE

## 2020-10-14 RX ORDER — PREGABALIN 25 MG/1
25 CAPSULE ORAL 2 TIMES DAILY
Qty: 60 CAPSULE | Refills: 5 | Status: SHIPPED | OUTPATIENT
Start: 2020-10-14 | End: 2021-05-12 | Stop reason: SDUPTHER

## 2020-10-14 RX ORDER — ESTERIFIED ESTROGENS AND METHYLTESTOSTERONE 1.25; 2.5 MG/1; MG/1
TABLET, FILM COATED ORAL
Qty: 90 TABLET | Refills: 0 | Status: SHIPPED | OUTPATIENT
Start: 2020-10-14 | End: 2021-03-02 | Stop reason: SDUPTHER

## 2020-10-14 ASSESSMENT — ENCOUNTER SYMPTOMS
SORE THROAT: 0
CONSTIPATION: 0
RECTAL PAIN: 0
ANAL BLEEDING: 0
ABDOMINAL DISTENTION: 0
ABDOMINAL PAIN: 0
SHORTNESS OF BREATH: 0
NAUSEA: 0
EYE PAIN: 0
ALLERGIC/IMMUNOLOGIC NEGATIVE: 1
SINUS PRESSURE: 0
BACK PAIN: 1
BLOOD IN STOOL: 0
RHINORRHEA: 0
VOMITING: 0
EYE REDNESS: 0
COUGH: 0
PHOTOPHOBIA: 0

## 2020-10-14 NOTE — TELEPHONE ENCOUNTER
Pharmacy called because DOV number of MD is not going through and is saying inactive and they cannot get a prescription for patient. Please contact with updated number.      Contact number: [phone]

## 2020-10-14 NOTE — PROGRESS NOTES
10/14/2020     Fermin Dewey (:  1965) is a 49 y.o. female, here for evaluation of the following medical concerns:    HPI  topamax helped with pain, walking and doing well, but has problems with word finding and concentration. Therefore patient could not tolerate Topamax so it was tapered off. .  When tapered off the topamax could concentrate, but severe pain. Patient feels as though she is an unbearable dilemma because with Topamax she had no pain and could function physically but not mentally and without Topamax she functions mentally but not physically.   Wt Readings from Last 3 Encounters:   10/14/20 162 lb (73.5 kg)   20 164 lb (74.4 kg)   20 164 lb 14.5 oz (74.8 kg)     Patient Active Problem List   Diagnosis    Other fatigue    Allergic rhinitis    Vitamin D deficiency    Insomnia    Hyperlipidemia    Neck pain    Constipation    Lumbar stenosis    Candida esophagitis (HCC)    Weight gain    Lumbar radiculopathy    Spondylolisthesis of lumbar region    HNP (herniated nucleus pulposus), lumbar    Muscle spasm    Acute non-recurrent maxillary sinusitis    Skin hypopigmentation    Eczema    Melanonychia striata    Neural foraminal stenosis of lumbar spine    Sacroiliitis (HCC)    Symptomatic menopausal or female climacteric states    Radiculopathy, lumbosacral region    Spinal stenosis of lumbar region    Acquired spondylolisthesis    Acute serous otitis media    Mild intermittent asthma without complication    Gastroesophageal reflux disease with esophagitis    Family history of type 2 diabetes mellitus in mother   Ardyth Moritz Thrush     Allergies   Allergen Reactions    Fruit Acid Concentrate      Tomato, strawberries    Penicillins Hives    Pollen Extract     Tomato Anaphylaxis     Vomiting  Vomiting    Bee Pollen     Fruit & Vegetable Daily  [Cholestatin]     Fruit Extracts     Influenza Vac Split Quad     Meloxicam      Rapid weight gain    Versed [Midazolam] 600+D) 600-200 MG-UNIT TABS Take 1 each by mouth 2 times daily Yes Wilmar Moreno MD   albuterol sulfate HFA (VENTOLIN HFA) 108 (90 Base) MCG/ACT inhaler Inhale 2 puffs into the lungs every 6 hours as needed for Wheezing Yes Wilmar Moreno MD   ondansetron (ZOFRAN) 4 MG tablet Take 1-2 tablets by mouth every 12 hours as needed for Nausea Yes HANS Jaramillo - CNP   Cholecalciferol (VITAMIN D) 50 MCG (2000 UT) CAPS capsule Take 1 capsule by mouth once daily Yes Wilmar Moreno MD   triamcinolone (KENALOG) 0.05 % OINT ointment Apply topically 2 times daily To back of the neck Yes Wilmar Moreno MD   tacrolimus (PROTOPIC) 0.1 % ointment Apply to affected area BID Yes Brittny Mixon MD   azelastine (ASTELIN) 0.1 % nasal spray 1 spray by Nasal route 2 times daily Use in each nostril as directed Yes Wilmar Moreno MD        Social History     Tobacco Use    Smoking status: Never Smoker    Smokeless tobacco: Never Used   Substance Use Topics    Alcohol use: Yes     Comment: 3-4 times a year        Vitals:    10/14/20 1130   BP: 110/73   Pulse: 87   Temp: 97.2 °F (36.2 °C)   TempSrc: Oral   SpO2: 99%   Weight: 162 lb (73.5 kg)   Height: 5' 6\" (1.676 m)     Estimated body mass index is 26.15 kg/m² as calculated from the following:    Height as of this encounter: 5' 6\" (1.676 m). Weight as of this encounter: 162 lb (73.5 kg). Physical Exam  Constitutional:       General: She is not in acute distress. Appearance: She is well-developed. She is not diaphoretic. HENT:      Head: Normocephalic and atraumatic. Right Ear: External ear normal.      Left Ear: External ear normal.      Nose: Nose normal.   Eyes:      General: No scleral icterus. Right eye: No discharge. Left eye: No discharge. Conjunctiva/sclera: Conjunctivae normal.      Pupils: Pupils are equal, round, and reactive to light.    Neck:      Musculoskeletal: Normal range of motion and neck supple. Thyroid: No thyromegaly. Vascular: No JVD. Trachea: No tracheal deviation. Cardiovascular:      Rate and Rhythm: Normal rate. Heart sounds: Normal heart sounds. No murmur. No gallop. Pulmonary:      Effort: Pulmonary effort is normal. No respiratory distress. Breath sounds: Normal breath sounds. No wheezing or rales. Chest:      Chest wall: No tenderness. Abdominal:      General: Bowel sounds are normal. There is no distension. Palpations: Abdomen is soft. There is no mass. Tenderness: There is no abdominal tenderness. There is no guarding or rebound. Musculoskeletal:         General: No tenderness or deformity. Lymphadenopathy:      Cervical: No cervical adenopathy. Skin:     General: Skin is warm and dry. Coloration: Skin is not pale. Findings: No erythema or rash. Comments: Find papillary ration hyperpigmentation posterior neck consistent with atopic dermatitis   Neurological:      Mental Status: She is alert and oriented to person, place, and time. Cranial Nerves: No cranial nerve deficit. Motor: No abnormal muscle tone. Coordination: Coordination normal.      Deep Tendon Reflexes: Reflexes normal.      Comments: Decreased knee jerks , kurtis biceps and triceps reflexes. Reduced spasm in lower back. Reduced range of motion of lumbar spine. Psychiatric:         Behavior: Behavior normal.         Thought Content: Thought content normal.         Judgment: Judgment normal.         ASSESSMENT/PLAN:   Diagnosis Orders   1. Encounter for screening mammogram for breast cancer  [facility] DIGITAL SCREEN BILATERAL   2. Low libido, initially conjugated estrogens methyl testosterone was very helpful and now not helpful. Continue current dose of estrogen replacement therapy since patient is on the higher dosage and we cannot increase.  Will have patient take it on empty stomach to make sure she has good absorption of the

## 2020-10-14 NOTE — LETTER
[facility]  [address] [id number]  Phone: [phone]  Fax: [phone]    Leland Randle MD        October 14, 2020     Patient: Mark Amador   YOB: 1965   Date of Visit: 10/14/2020       To Whom It May Concern:    Mark Amador is physically unable to do CPR due to lumbar degenerative disc disease with radiculopathy. .  This is a permanent condition. If you have any questions or concerns, please don't hesitate to call.     Sincerely,        Leland Rnadle MD

## 2020-10-15 LAB
ESTIMATED AVERAGE GLUCOSE: 114 MG/DL
HBA1C MFR BLD: 5.6 %

## 2020-10-26 RX ORDER — CLOTRIMAZOLE 10 MG/1
10 LOZENGE ORAL; TOPICAL
Qty: 50 TABLET | Refills: 0 | Status: SHIPPED | OUTPATIENT
Start: 2020-10-26 | End: 2020-11-05

## 2020-11-25 ENCOUNTER — TELEPHONE (OUTPATIENT)
Dept: PRIMARY CARE CLINIC | Age: 55
End: 2020-11-25

## 2020-11-30 ENCOUNTER — OFFICE VISIT (OUTPATIENT)
Dept: PRIMARY CARE CLINIC | Age: 55
End: 2020-11-30
Payer: COMMERCIAL

## 2020-11-30 VITALS
HEART RATE: 93 BPM | DIASTOLIC BLOOD PRESSURE: 74 MMHG | SYSTOLIC BLOOD PRESSURE: 114 MMHG | HEIGHT: 66 IN | TEMPERATURE: 97.3 F | WEIGHT: 160 LBS | OXYGEN SATURATION: 100 % | BODY MASS INDEX: 25.71 KG/M2

## 2020-11-30 PROCEDURE — 99214 OFFICE O/P EST MOD 30 MIN: CPT | Performed by: INTERNAL MEDICINE

## 2020-11-30 RX ORDER — FLUCONAZOLE 100 MG/1
100 TABLET ORAL DAILY
Qty: 14 TABLET | Refills: 0 | Status: SHIPPED | OUTPATIENT
Start: 2020-11-30 | End: 2020-12-14

## 2020-11-30 RX ORDER — RIMEGEPANT SULFATE 75 MG/75MG
1 TABLET, ORALLY DISINTEGRATING ORAL DAILY
Qty: 8 TABLET | Refills: 1 | Status: SHIPPED | OUTPATIENT
Start: 2020-11-30 | End: 2020-12-31 | Stop reason: SDUPTHER

## 2020-11-30 NOTE — PROGRESS NOTES
2020     Sabino Bingham (:  1965) is a 48 y.o. female, here for evaluation of the following medical concerns:    HPI     Headache: Patient complains of headache. She does have a headache at this time. Description of Headaches:  Location of pain: bilateral, temporal, frontal  Radiation of pain?:ears  Character of pain:aching and pressure  Severity of pain: 5  Accompanying symptoms: photophobia  Prodromal sx?: no aura  Rapidity of onset: gradual  Typical duration of individual headache: several days  Are most headaches similar in presentation? yes  Typical precipitants:work:     Temporal Pattern of Headaches:  Started having HAs several months ago  Worst time of day: morning. Awaken from sleep?: no  Seasonal pattern?: no  Clustering of HAs over time? yes - over days  Overall pattern since problem began: unchanged    Degree of Functional Impairment: mild    Current Use of Meds to Treat HA:  Abortive meds? excedrin migraine. Daily use? yes -   Prophylactic meds? None. Additional Relevant History:  History of head/neck trauma? no  History of head/neck surgery? no  Family h/o headache problems? Patient has migraines, but they do not run in the family. Use of meds that might worsen HAs? no  Exposure to carbon monoxide? no  Substance use: alcohol: no, illicit drugs: no, tobacco: no, caffeine: no     spouse has a sore throat. Chronic thrush with normal  A1c. Lab Results   Component Value Date    LABA1C 5.6 10/14/2020    LABA1C 5.4 2019    LABA1C 5.3 2018     Lab Results   Component Value Date    LABMICR Not Indicated 10/14/2020    LDLCALC 122 (H) 10/14/2020    CREATININE <0.5 (L) 10/14/2020   negative HIV.   Patient Active Problem List   Diagnosis    Other fatigue    Allergic rhinitis    Vitamin D deficiency    Insomnia    Hyperlipidemia    Neck pain    Constipation    Lumbar stenosis    Candida esophagitis (HCC)    Weight gain    Lumbar radiculopathy    Spondylolisthesis of lumbar region    HNP (herniated nucleus pulposus), lumbar    Muscle spasm    Acute non-recurrent maxillary sinusitis    Skin hypopigmentation    Eczema    Melanonychia striata    Neural foraminal stenosis of lumbar spine    Sacroiliitis (HCC)    Symptomatic menopausal or female climacteric states    Radiculopathy, lumbosacral region    Spinal stenosis of lumbar region    Acquired spondylolisthesis    Acute serous otitis media    Mild intermittent asthma without complication    Gastroesophageal reflux disease with esophagitis    Family history of type 2 diabetes mellitus in mother   Gokul Destrehan Thrush     Allergies   Allergen Reactions    Fruit Acid Concentrate      Tomato, strawberries    Penicillins Hives    Pollen Extract     Tomato Anaphylaxis     Vomiting  Vomiting    Bee Pollen     Fruit & Vegetable Daily  [Cholestatin]     Fruit Extracts     Influenza Vac Split Quad     Meloxicam      Rapid weight gain    Versed [Midazolam] Other (See Comments)     Extremely sleepy with a dose of 2 mg. Reversal used To wake patient up. She states this happened at her colonoscopy the week before also.  Influenza Vaccines Rash     Hives with vaccine. Review of Systems   Constitutional: Negative for chills, diaphoresis, fatigue and fever. HENT: Positive for congestion and postnasal drip. Negative for ear pain, rhinorrhea, sinus pressure and sore throat. Eyes: Negative for photophobia, pain and redness. Respiratory: Positive for cough. Negative for shortness of breath. Cardiovascular: Negative. Negative for chest pain. Gastrointestinal: Negative for abdominal distention, abdominal pain, anal bleeding, blood in stool, constipation, nausea, rectal pain and vomiting. Endocrine: Negative. Genitourinary: Negative. Negative for dysuria and pelvic pain. Musculoskeletal: Positive for back pain. Negative for joint swelling, myalgias and neck pain.         Low back pain Mera Chaparro MD   azelastine (ASTELIN) 0.1 % nasal spray 1 spray by Nasal route 2 times daily Use in each nostril as directed Yes Mariano Lai MD        Social History     Tobacco Use    Smoking status: Never Smoker    Smokeless tobacco: Never Used   Substance Use Topics    Alcohol use: Yes     Comment: 3-4 times a year        Vitals:    11/30/20 1033   BP: 114/74   Pulse: 93   Temp: 97.3 °F (36.3 °C)   TempSrc: Oral   SpO2: 100%   Weight: 160 lb (72.6 kg)   Height: 5' 6\" (1.676 m)     Estimated body mass index is 25.82 kg/m² as calculated from the following:    Height as of this encounter: 5' 6\" (1.676 m). Weight as of this encounter: 160 lb (72.6 kg). Physical Exam  Constitutional:       General: She is not in acute distress. Appearance: She is well-developed. She is not diaphoretic. HENT:      Head: Normocephalic and atraumatic. Right Ear: External ear normal.      Left Ear: External ear normal.      Nose: Nose normal.      Comments: Tongue is coated white and the papillae are red and inflamed  Eyes:      General: No scleral icterus. Right eye: No discharge. Left eye: No discharge. Conjunctiva/sclera: Conjunctivae normal.      Pupils: Pupils are equal, round, and reactive to light. Neck:      Musculoskeletal: Normal range of motion and neck supple. Thyroid: No thyromegaly. Vascular: No JVD. Trachea: No tracheal deviation. Cardiovascular:      Rate and Rhythm: Normal rate. Heart sounds: Normal heart sounds. No murmur. No gallop. Pulmonary:      Effort: Pulmonary effort is normal. No respiratory distress. Breath sounds: Normal breath sounds. No wheezing or rales. Chest:      Chest wall: No tenderness. Abdominal:      General: Bowel sounds are normal. There is no distension. Palpations: Abdomen is soft. There is no mass. Tenderness: There is no abdominal tenderness. There is no guarding or rebound.    Musculoskeletal: General: No tenderness or deformity. Lymphadenopathy:      Cervical: No cervical adenopathy. Skin:     General: Skin is warm and dry. Coloration: Skin is not pale. Findings: No erythema or rash. Comments: Find papillary ration hyperpigmentation posterior neck consistent with atopic dermatitis   Neurological:      Mental Status: She is alert and oriented to person, place, and time. Cranial Nerves: No cranial nerve deficit. Motor: No abnormal muscle tone. Coordination: Coordination normal.      Deep Tendon Reflexes: Reflexes normal.      Comments: Decreased knee jerks , kurtis biceps and triceps reflexes. Reduced spasm in lower back. Reduced range of motion of lumbar spine. Psychiatric:         Behavior: Behavior normal.         Thought Content: Thought content normal.         Judgment: Judgment normal.         ASSESSMENT/PLAN:   Diagnosis Orders   1. Intractable cluster headache syndrome, unspecified chronicity pattern Covid-19 test was negative. Will give  Nurtec and due to increasing severity of headaches check MRI of the brain. COVID-19 Ambulatory    MRI BRAIN WO CONTRAST    Rimegepant Sulfate (NURTEC) 75 MG TBDP   2. Oral thrush, recurrent    Lab Results   Component Value Date    LABA1C 5.6 10/14/2020     Lab Results   Component Value Date    .0 10/14/2020     HIV test was negative. fluconazole (DIFLUCAN) 100 MG tablet   3. Acute bronchitis, unspecified organism productive of phlegm. Give doxycycline hundred milligrams twice a day for one week. An electronic signature was used to authenticate this note.     --Susan Church MD on 11/30/2020 at 10:55 AM

## 2020-12-01 ENCOUNTER — TELEPHONE (OUTPATIENT)
Dept: PRIMARY CARE CLINIC | Age: 55
End: 2020-12-01

## 2020-12-01 DIAGNOSIS — G44.001 INTRACTABLE CLUSTER HEADACHE SYNDROME, UNSPECIFIED CHRONICITY PATTERN: ICD-10-CM

## 2020-12-01 RX ORDER — DOXYCYCLINE HYCLATE 100 MG
100 TABLET ORAL 2 TIMES DAILY
Qty: 20 TABLET | Refills: 0 | Status: SHIPPED | OUTPATIENT
Start: 2020-12-01 | End: 2020-12-11

## 2020-12-01 ASSESSMENT — ENCOUNTER SYMPTOMS
ALLERGIC/IMMUNOLOGIC NEGATIVE: 1
NAUSEA: 0
BLOOD IN STOOL: 0
VOMITING: 0
RHINORRHEA: 0
RECTAL PAIN: 0
COUGH: 1
BACK PAIN: 1
ABDOMINAL DISTENTION: 0
SHORTNESS OF BREATH: 0
EYE REDNESS: 0
ANAL BLEEDING: 0
CONSTIPATION: 0
SINUS PRESSURE: 0
SORE THROAT: 0
EYE PAIN: 0
ABDOMINAL PAIN: 0
PHOTOPHOBIA: 0

## 2020-12-01 ASSESSMENT — PATIENT HEALTH QUESTIONNAIRE - PHQ9
SUM OF ALL RESPONSES TO PHQ QUESTIONS 1-9: 0
2. FEELING DOWN, DEPRESSED OR HOPELESS: 0
SUM OF ALL RESPONSES TO PHQ9 QUESTIONS 1 & 2: 0
1. LITTLE INTEREST OR PLEASURE IN DOING THINGS: 0

## 2020-12-01 NOTE — TELEPHONE ENCOUNTER
Patient says she was supposed to have a VVC with Dr. Vicky Angelo yesterday to look in her mouth but never received her call. Please call and advise.

## 2020-12-04 LAB — SARS-COV-2, IGG: NEGATIVE

## 2020-12-11 ENCOUNTER — TELEPHONE (OUTPATIENT)
Dept: PRIMARY CARE CLINIC | Age: 55
End: 2020-12-11

## 2020-12-15 NOTE — TELEPHONE ENCOUNTER
Patient know that insurance did not cover the nerve attack. Give her a savings card from the office.

## 2020-12-31 ENCOUNTER — TELEPHONE (OUTPATIENT)
Dept: PRIMARY CARE CLINIC | Age: 55
End: 2020-12-31

## 2020-12-31 ENCOUNTER — OFFICE VISIT (OUTPATIENT)
Dept: PRIMARY CARE CLINIC | Age: 55
End: 2020-12-31
Payer: COMMERCIAL

## 2020-12-31 VITALS
HEART RATE: 63 BPM | SYSTOLIC BLOOD PRESSURE: 112 MMHG | WEIGHT: 160 LBS | BODY MASS INDEX: 25.71 KG/M2 | HEIGHT: 66 IN | TEMPERATURE: 97.5 F | OXYGEN SATURATION: 100 % | DIASTOLIC BLOOD PRESSURE: 71 MMHG

## 2020-12-31 DIAGNOSIS — R04.0 RIGHT-SIDED EPISTAXIS: ICD-10-CM

## 2020-12-31 DIAGNOSIS — K59.04 CHRONIC IDIOPATHIC CONSTIPATION: Primary | ICD-10-CM

## 2020-12-31 DIAGNOSIS — L50.9 HIVES: ICD-10-CM

## 2020-12-31 DIAGNOSIS — G44.001 INTRACTABLE CLUSTER HEADACHE SYNDROME, UNSPECIFIED CHRONICITY PATTERN: ICD-10-CM

## 2020-12-31 DIAGNOSIS — R51.9 TEMPORAL HEADACHE: ICD-10-CM

## 2020-12-31 DIAGNOSIS — B37.0 THRUSH: ICD-10-CM

## 2020-12-31 DIAGNOSIS — M48.061 SPINAL STENOSIS OF LUMBAR REGION WITHOUT NEUROGENIC CLAUDICATION: ICD-10-CM

## 2020-12-31 PROCEDURE — 99214 OFFICE O/P EST MOD 30 MIN: CPT | Performed by: INTERNAL MEDICINE

## 2020-12-31 RX ORDER — POLYETHYLENE GLYCOL 3350 17 G/17G
34 POWDER, FOR SOLUTION ORAL DAILY
Qty: 510 G | Refills: 11 | Status: SHIPPED | OUTPATIENT
Start: 2020-12-31 | End: 2021-07-01 | Stop reason: SDUPTHER

## 2020-12-31 RX ORDER — EPINEPHRINE 0.3 MG/.3ML
0.3 INJECTION SUBCUTANEOUS ONCE
Qty: 0.3 ML | Refills: 5 | Status: SHIPPED | OUTPATIENT
Start: 2020-12-31 | End: 2022-06-23 | Stop reason: SDUPTHER

## 2020-12-31 RX ORDER — CLOTRIMAZOLE 10 MG/1
10 LOZENGE ORAL; TOPICAL
Qty: 50 TABLET | Refills: 0 | Status: SHIPPED | OUTPATIENT
Start: 2020-12-31 | End: 2021-01-10

## 2020-12-31 RX ORDER — RIMEGEPANT SULFATE 75 MG/75MG
1 TABLET, ORALLY DISINTEGRATING ORAL DAILY
Qty: 8 TABLET | Refills: 1 | Status: SHIPPED | OUTPATIENT
Start: 2020-12-31 | End: 2022-04-07

## 2020-12-31 RX ORDER — BACLOFEN 5 MG/1
5 TABLET ORAL 3 TIMES DAILY PRN
Qty: 90 TABLET | Refills: 5 | Status: SHIPPED | OUTPATIENT
Start: 2020-12-31 | End: 2022-01-17

## 2020-12-31 RX ORDER — BUTALBITAL AND ACETAMINOPHEN 25; 325 MG/1; MG/1
1 TABLET ORAL DAILY PRN
Qty: 30 TABLET | Refills: 0 | Status: SHIPPED | OUTPATIENT
Start: 2020-12-31 | End: 2021-01-06 | Stop reason: SDUPTHER

## 2020-12-31 NOTE — TELEPHONE ENCOUNTER
[facility] received a call from:    Name of Caller: Gilson Torres    Relationship to patient:Pharmcists     Organization name: [employer] [employer] contact number: [phone]  Fax- [phone]  Reason for call: The medication that was sent over to be filled Fermin Rocio is not available at the pharmacy. PCP needs to send the medication to a different pharmacy.

## 2021-01-03 ASSESSMENT — ENCOUNTER SYMPTOMS
SWOLLEN GLANDS: 0
BLURRED VISION: 0

## 2021-01-04 ENCOUNTER — TELEPHONE (OUTPATIENT)
Dept: PRIMARY CARE CLINIC | Age: 56
End: 2021-01-04

## 2021-01-06 DIAGNOSIS — R51.9 TEMPORAL HEADACHE: ICD-10-CM

## 2021-01-06 RX ORDER — BUTALBITAL AND ACETAMINOPHEN 25; 325 MG/1; MG/1
1 TABLET ORAL DAILY PRN
Qty: 30 TABLET | Refills: 0 | Status: SHIPPED | OUTPATIENT
Start: 2021-01-06 | End: 2021-01-14 | Stop reason: SDUPTHER

## 2021-01-08 RX ORDER — BUTALBITAL AND ACETAMINOPHEN 25; 325 MG/1; MG/1
1 TABLET ORAL DAILY PRN
Qty: 30 TABLET | Refills: 0 | OUTPATIENT
Start: 2021-01-08

## 2021-01-11 ENCOUNTER — TELEPHONE (OUTPATIENT)
Dept: PRIMARY CARE CLINIC | Age: 56
End: 2021-01-11

## 2021-01-11 NOTE — TELEPHONE ENCOUNTER
Tell patient not to get the fioracet without caffeine because it has codiene. Give patient nurtec samples and a nurtec saving card.

## 2021-01-11 NOTE — TELEPHONE ENCOUNTER
[facility] said we ordered Butabital and acetaminophen on 31st and then again last week. They said they have Fioricet available, which is Butalbital with acetaminophen and caffeine  50/325/40. Is it OK to fill this instead? If it is OK, please change script.

## 2021-01-13 ENCOUNTER — TELEPHONE (OUTPATIENT)
Dept: PRIMARY CARE CLINIC | Age: 56
End: 2021-01-13

## 2021-01-13 NOTE — TELEPHONE ENCOUNTER
Pt is going to do a covid test today around 11:40am, because her spouse tested positive for Covid. She has a very bad H/A. Can you prescribe something to treat H/A? Also, temp raghavendra from 101.5 to 99.1 after taking Tylenol. pls return her call. Thank you!      Cell: [phone]

## 2021-01-14 ENCOUNTER — VIRTUAL VISIT (OUTPATIENT)
Dept: PRIMARY CARE CLINIC | Age: 56
End: 2021-01-14
Payer: COMMERCIAL

## 2021-01-14 VITALS — DIASTOLIC BLOOD PRESSURE: 74 MMHG | SYSTOLIC BLOOD PRESSURE: 104 MMHG | OXYGEN SATURATION: 98 % | HEART RATE: 73 BPM

## 2021-01-14 DIAGNOSIS — U07.1 COVID-19 VIRUS INFECTION: Primary | ICD-10-CM

## 2021-01-14 DIAGNOSIS — R51.9 TEMPORAL HEADACHE: ICD-10-CM

## 2021-01-14 PROCEDURE — 99213 OFFICE O/P EST LOW 20 MIN: CPT | Performed by: INTERNAL MEDICINE

## 2021-01-14 RX ORDER — BUTALBITAL AND ACETAMINOPHEN 25; 325 MG/1; MG/1
1 TABLET ORAL DAILY PRN
Qty: 30 TABLET | Refills: 0 | Status: SHIPPED | OUTPATIENT
Start: 2021-01-14 | End: 2021-01-15 | Stop reason: SDUPTHER

## 2021-01-14 NOTE — PROGRESS NOTES
Hal Ge (:  1965) is a 48 y.o. female,Established patient, here for evaluation of the following chief complaint(s): No chief complaint on file. ASSESSMENT/PLAN:   Diagnosis Orders   1. COVID-19 virus infection : purchase pulse ox and check tid and go to er if 92% or less. Cholecalciferol (VITAMIN D3) 125 MCG (5000 UT) CAPS    zinc 50 MG CAPS   2. Temporal headache  DISCONTINUED: Butalbital-Acetaminophen  MG TABS       SUBJECTIVE/OBJECTIVE:  Tuesday night awakened on 21 with a pounding handing , hoarseness, nausea and mild non productive cough. No shortness of breath. Patient lost taste and smell and temperature of 101.9 at the highest and sweats and chills. Patient is urinating and taking some fluids. Spouse tested positive on 21 and patient had a test that was negative on 21. Review of Systems   Constitutional: Positive for activity change, appetite change, chills and fatigue. Negative for diaphoresis, fever and unexpected weight change. HENT: Positive for congestion. Negative for hearing loss and postnasal drip. Eyes: Negative. Respiratory: Positive for cough. Negative for chest tightness, shortness of breath, wheezing and stridor. Cardiovascular: Negative. Endocrine: Negative. Genitourinary: Negative. Allergic/Immunologic: Negative. Neurological: Positive for headaches. Hematological: Negative. No flowsheet data found. Physical Exam    [INSTRUCTIONS:  \"[x]\" Indicates a positive item  \"[]\" Indicates a negative item  -- DELETE ALL ITEMS NOT EXAMINED]    Constitutional: [] Appears well-developed and well-nourished [x] No apparent distress      [x] Abnormal - hoarse voice and looks like does not feel well.   Mental status: [x] Alert and awake  [x] Oriented to person/place/time [x] Able to follow commands    [] Abnormal -     Eyes:   EOM    [x]  Normal    [] Abnormal -   Sclera  [x]  Normal    [] Abnormal - Discharge [x]  None visible   [] Abnormal -     HENT: [x] Normocephalic, atraumatic  [] Abnormal -   [x] Mouth/Throat: Mucous membranes are moist    External Ears [x] Normal  [] Abnormal -    Neck: [x] No visualized mass [] Abnormal -     Pulmonary/Chest: [x] Respiratory effort normal   [x] No visualized signs of difficulty breathing or respiratory distress        [] Abnormal -      Musculoskeletal:   [] Normal gait with no signs of ataxia         [x] Normal range of motion of neck        [] Abnormal -     Neurological:        [x] No Facial Asymmetry (Cranial nerve 7 motor function) (limited exam due to video visit)          [x] No gaze palsy        [] Abnormal -          Skin:        [x] No significant exanthematous lesions or discoloration noted on facial skin         [] Abnormal -            Psychiatric:       [x] Normal Affect [] Abnormal -        [x] No Hallucinations    Other pertinent observable physical exam findings:-          On this date 01/17/21 I have spent 20 minutes reviewing previous notes, test results and face to face (virtual) with the patient discussing the diagnosis and importance of compliance with the treatment plan as well as documenting on the day of the visit. Candelario Acosta is a 48 y.o. female being evaluated by a Virtual Visit (video visit) encounter to address concerns as mentioned above. A caregiver was present when appropriate. Due to this being a TeleHealth encounter (During DSK-49 public health emergency), evaluation of the following organ systems was limited: Vitals/Constitutional/EENT/Resp/CV/GI//MS/Neuro/Skin/Heme-Lymph-Imm. Pursuant to the emergency declaration under the [address] [place] [address] and the [employer] and [employer] Act, this Virtual Visit was conducted with patient's (and/or legal guardian's) consent, to reduce the patient's risk of exposure to COVID-19 and provide necessary medical care. The patient (and/or legal guardian) has also been advised to contact this office for worsening conditions or problems, and seek emergency medical treatment and/or call 911 if deemed necessary. Patient identification was verified at the start of the visit: Yes    Services were provided through a video synchronous discussion virtually to substitute for in-person clinic visit. Patient was located at home and provider was located in office or at home. An electronic signature was used to authenticate this note.     --Vern North MD

## 2021-01-15 DIAGNOSIS — R51.9 TEMPORAL HEADACHE: ICD-10-CM

## 2021-01-15 RX ORDER — BUTALBITAL AND ACETAMINOPHEN 25; 325 MG/1; MG/1
1 TABLET ORAL DAILY PRN
Qty: 30 TABLET | Refills: 0 | Status: SHIPPED | OUTPATIENT
Start: 2021-01-15 | End: 2021-02-11

## 2021-01-17 ASSESSMENT — ENCOUNTER SYMPTOMS
WHEEZING: 0
ALLERGIC/IMMUNOLOGIC NEGATIVE: 1
CHEST TIGHTNESS: 0
EYES NEGATIVE: 1
STRIDOR: 0
SHORTNESS OF BREATH: 0
COUGH: 1

## 2021-01-21 ENCOUNTER — TELEPHONE (OUTPATIENT)
Dept: PRIMARY CARE CLINIC | Age: 56
End: 2021-01-21

## 2021-01-21 DIAGNOSIS — J20.9 ACUTE BRONCHITIS, UNSPECIFIED ORGANISM: Primary | ICD-10-CM

## 2021-01-21 RX ORDER — DOXYCYCLINE HYCLATE 100 MG
100 TABLET ORAL 2 TIMES DAILY
Qty: 14 TABLET | Refills: 0 | Status: SHIPPED | OUTPATIENT
Start: 2021-01-21 | End: 2021-01-28

## 2021-01-22 NOTE — TELEPHONE ENCOUNTER
Patient has Covid 19 infection . Positive test 1/15/21 and symptoms started 4 days prior. Spouse is also positive. O2 sats 98 % with pulse of 82 and taking in fluids. Concerned because started bringing up green phlegm 2 days ago. Patient does not need antibiotics for Covid-19 infection but concern for secondary bacterial infection with the phlegm changing from white to green so will send in doxycycline hundred milligrams twice a day for one week. Encouraged to take doxycycline with foods to avoid G. [employer]. upset.

## 2021-01-30 ENCOUNTER — OFFICE VISIT (OUTPATIENT)
Dept: PRIMARY CARE CLINIC | Age: 56
End: 2021-01-30
Payer: COMMERCIAL

## 2021-01-30 DIAGNOSIS — Z20.822 SUSPECTED COVID-19 VIRUS INFECTION: Primary | ICD-10-CM

## 2021-01-30 PROCEDURE — 99421 OL DIG E/M SVC 5-10 MIN: CPT | Performed by: NURSE PRACTITIONER

## 2021-01-31 LAB — SARS-COV-2, NAA: ABNORMAL

## 2021-02-01 ENCOUNTER — TELEPHONE (OUTPATIENT)
Dept: FAMILY MEDICINE CLINIC | Age: 56
End: 2021-02-01

## 2021-02-01 NOTE — TELEPHONE ENCOUNTER
Prior result note is error. Specimen was rejected. Patient was notified that she needs to be retested.

## 2021-02-06 ENCOUNTER — OFFICE VISIT (OUTPATIENT)
Dept: PRIMARY CARE CLINIC | Age: 56
End: 2021-02-06
Payer: COMMERCIAL

## 2021-02-06 DIAGNOSIS — Z20.822 SUSPECTED COVID-19 VIRUS INFECTION: Primary | ICD-10-CM

## 2021-02-06 PROCEDURE — 99211 OFF/OP EST MAY X REQ PHY/QHP: CPT | Performed by: NURSE PRACTITIONER

## 2021-02-06 NOTE — PROGRESS NOTES
Nesha Bonilla received a viral test for COVID-19. They were educated on isolation and quarantine as appropriate. For any symptoms, they were directed to seek care from their PCP, given contact information to establish with a doctor, directed to an urgent care or the emergency room.

## 2021-02-07 LAB — SARS-COV-2: NOT DETECTED

## 2021-02-11 ENCOUNTER — OFFICE VISIT (OUTPATIENT)
Dept: PRIMARY CARE CLINIC | Age: 56
End: 2021-02-11
Payer: COMMERCIAL

## 2021-02-11 VITALS
DIASTOLIC BLOOD PRESSURE: 73 MMHG | HEART RATE: 63 BPM | HEIGHT: 66 IN | TEMPERATURE: 97.1 F | OXYGEN SATURATION: 100 % | SYSTOLIC BLOOD PRESSURE: 119 MMHG | BODY MASS INDEX: 25.71 KG/M2 | WEIGHT: 160 LBS

## 2021-02-11 DIAGNOSIS — Z86.19 HISTORY OF HELICOBACTER PYLORI INFECTION: Primary | ICD-10-CM

## 2021-02-11 DIAGNOSIS — L50.9 HIVES: ICD-10-CM

## 2021-02-11 DIAGNOSIS — Z00.00 HEALTHCARE MAINTENANCE: ICD-10-CM

## 2021-02-11 DIAGNOSIS — M54.17 RADICULOPATHY, LUMBOSACRAL REGION: ICD-10-CM

## 2021-02-11 DIAGNOSIS — Z86.19 HISTORY OF HELICOBACTER PYLORI INFECTION: ICD-10-CM

## 2021-02-11 PROCEDURE — 99214 OFFICE O/P EST MOD 30 MIN: CPT | Performed by: INTERNAL MEDICINE

## 2021-02-11 RX ORDER — TOPIRAMATE 25 MG/1
25 TABLET ORAL NIGHTLY
Qty: 60 TABLET | Refills: 3 | Status: SHIPPED | OUTPATIENT
Start: 2021-02-11 | End: 2021-11-08

## 2021-02-11 ASSESSMENT — PATIENT HEALTH QUESTIONNAIRE - PHQ9
2. FEELING DOWN, DEPRESSED OR HOPELESS: 0
1. LITTLE INTEREST OR PLEASURE IN DOING THINGS: 0
SUM OF ALL RESPONSES TO PHQ9 QUESTIONS 1 & 2: 0

## 2021-02-11 NOTE — PROGRESS NOTES
2021    Demetrice Hernandez (:  1965) is a 48 y.o. 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 pylori, pseudoclaudication improved with lumbar laminectomy in 2017, chronic constipation: Started on Linzess 145 mg, recent spouse and patient Covid infection now more than 10 days out from diagnosis comes in to establish care. She feels a couple times that the she has used Nurtec it has helped her with her headache. She has yet to schedule her MRI. Spite back surgery she still is troubled by back pain. 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. She found 50 mg twice daily Topamax too sedating, but noticed worsening pain on low-dose Lyrica 25 mg at bedtime when she went off the Topamax and would like to resume it. Review of Systems   Constitutional: Negative for activity change, appetite change, fatigue and unexpected weight change. HENT: Negative for dental problem, sinus pain, sore throat and trouble swallowing. Eyes: Negative for pain and visual disturbance. Respiratory: Negative for apnea, cough, chest tightness, shortness of breath and wheezing. Cardiovascular: Negative for chest pain and palpitations. Gastrointestinal: Negative for abdominal pain, blood in stool, constipation, diarrhea, nausea, rectal pain and vomiting. Endocrine: Negative for cold intolerance, heat intolerance, polydipsia, polyphagia and polyuria. Genitourinary: Negative for difficulty urinating, dysuria, flank pain, frequency, hematuria, pelvic pain, urgency, vaginal bleeding and vaginal discharge. Musculoskeletal: Negative for arthralgias, back pain, gait problem, joint swelling, myalgias, neck pain and neck stiffness. Skin: Negative for color change and rash.    Neurological: Negative for Cholecalciferol (VITAMIN D) 50 MCG (2000 UT) CAPS capsule Take 1 capsule by mouth once daily Yes Berna Gowers, MD   triamcinolone (KENALOG) 0.05 % OINT ointment Apply topically 2 times daily To back of the neck Yes Berna Gowers, MD   tacrolimus (PROTOPIC) 0.1 % ointment Apply to affected area BID Yes Ewelina Crane MD   azelastine (ASTELIN) 0.1 % nasal spray 1 spray by Nasal route 2 times daily Use in each nostril as directed Yes Berna Gowers, MD   zinc 50 MG CAPS Take 50 mg by mouth daily for 14 days  Berna Gowers, MD   EPINEPHrine (EPIPEN 2-DAMIAN) 0.3 MG/0.3ML SOAJ injection Inject 0.3 mLs into the muscle once for 1 dose Use as directed for allergic reaction  Berna Gowers, MD        Allergies   Allergen Reactions    Fruit Acid Concentrate      Tomato, strawberries    Penicillins Hives    Pollen Extract     Tomato Anaphylaxis     Vomiting  Vomiting    Bee Pollen     Fruit & Vegetable Daily  [Cholestatin]     Fruit Extracts     Influenza Vac Split Quad     Meloxicam      Rapid weight gain    Sumatriptan      Sick weak.  Versed [Midazolam] Other (See Comments)     Extremely sleepy with a dose of 2 mg. Reversal used To wake patient up. She states this happened at her colonoscopy the week before also.  Influenza Vaccines Rash     Hives with vaccine.        Past Medical History:   Diagnosis Date    Allergic rhinitis     Chronic back pain     l4-L5 lumbar spinal stenosis    Constipation     Diverticulosis     Hyperlipidemia     Intractable cluster headache syndrome     Mild intermittent asthma without complication 9/1/1296       Past Surgical History:   Procedure Laterality Date    COLONOSCOPY N/A 9/13/2019    COLONOSCOPY biopsy sigmoid colon polyp performed by Michelle Vargas MD at [address]    endometriosis    HYSTERECTOMY, TOTAL ABDOMINAL      one ovary remains    LUMBAR FUSION Left 11/10/2017    LEFT L4-L5, L5-S1 Fear of current or ex partner: Not on file     Emotionally abused: Not on file     Physically abused: Not on file     Forced sexual activity: Not on file   Other Topics Concern    Not on file   Social History Narrative    Not on file        Family History   Problem Relation Age of Onset    Heart Disease Mother     High Blood Pressure Mother     Diabetes Mother     Heart Disease Father     Stroke Father     High Blood Pressure Father     Heart Disease Paternal Grandmother        Vitals:    02/11/21 1539   BP: 119/73   Pulse: 63   Temp: 97.1 °F (36.2 °C)   TempSrc: Oral   SpO2: 100%   Weight: 160 lb (72.6 kg)   Height: 5' 6\" (1.676 m)     Estimated body mass index is 25.82 kg/m² as calculated from the following:    Height as of this encounter: 5' 6\" (1.676 m). Weight as of this encounter: 160 lb (72.6 kg). PHYSICAL EXAM  GENERAL:  Pleasant  female who looks her stated age, awake alert and oriented x3, no acute distress. HEENT:  Normocephalic atraumatic. Pupils equal round reactive light and accommodation, extra ocular muscles are intact. Oropharynx is clear moist without injection or exudate. Tongue and palate move normally. Turbinates appear normal.  Tympanic membranes appear normal.  NECK:  Supple nontender. No carotid bruits. Brisk carotid upstrokes, no JVD. No thyromegaly. LYMPH:  No supraclavicular cervical axillary or inguinal lymphadenopathy. LUNGS:  Clear to auscultation bilaterally. Excellent air entry. No inspiratory crackles or expiratory wheezes. HEART:  Regular rate and rhythm without pathologic murmur rub gallop S3 or S4. ABDOMEN:  Soft, nontender. Normal bowel sounds. No guarding. No masses. UROGENITAL:  Deferred  EXTREMITIES:  Warm and well perfused without clubbing cyanosis or edema. 2+ pulses in all 4 extremities. Capillary refill less than 2 seconds. NEURO:  Cranial nerves 2-12 grossly intact. Normal muscle bulk and tone.   No resting tremor, Neutrophils Absolute 10/14/2020 2.1     Lymphocytes Absolute 10/14/2020 1.4     Monocytes Absolute 10/14/2020 0.3     Eosinophils Absolute 10/14/2020 0.0     Basophils Absolute 10/14/2020 0.0     Sodium 10/14/2020 141     Potassium 10/14/2020 4.0     Chloride 10/14/2020 106     CO2 10/14/2020 24     Anion Gap 10/14/2020 11     Glucose 10/14/2020 96     BUN 10/14/2020 15     CREATININE 10/14/2020 <0.5*    GFR Non- 10/14/2020 >60     GFR  10/14/2020 >60     Calcium 10/14/2020 9.7     Total Protein 10/14/2020 7.0     Albumin 10/14/2020 5.1*    Albumin/Globulin Ratio 10/14/2020 2.7*    Total Bilirubin 10/14/2020 1.5*    Alkaline Phosphatase 10/14/2020 49     ALT 10/14/2020 21     AST 10/14/2020 18     Globulin 10/14/2020 1.9     Hemoglobin A1C 10/14/2020 5.6     eAG 10/14/2020 114.0     TSH Reflex FT4 10/14/2020 1.66     Phosphorus 10/14/2020 2.6    Admission on 06/21/2020, Discharged on 06/22/2020   Component Date Value    WBC 06/21/2020 3.6*    RBC 06/21/2020 4.27     Hemoglobin 06/21/2020 13.4     Hematocrit 06/21/2020 40.1     MCV 06/21/2020 93.9     MCH 06/21/2020 31.4     MCHC 06/21/2020 33.4     RDW 06/21/2020 13.3     Platelets 36/83/5006 210     MPV 06/21/2020 8.0     Neutrophils % 06/21/2020 34.5     Lymphocytes % 06/21/2020 56.9     Monocytes % 06/21/2020 6.4     Eosinophils % 06/21/2020 1.8     Basophils % 06/21/2020 0.4     Neutrophils Absolute 06/21/2020 1.2*    Lymphocytes Absolute 06/21/2020 2.1     Monocytes Absolute 06/21/2020 0.2     Eosinophils Absolute 06/21/2020 0.1     Basophils Absolute 06/21/2020 0.0     Sodium 06/21/2020 142     Potassium reflex Magnesi* 06/21/2020 3.7     Chloride 06/21/2020 108     CO2 06/21/2020 21     Anion Gap 06/21/2020 13     Glucose 06/21/2020 87     BUN 06/21/2020 13     CREATININE 06/21/2020 0.6     GFR Non- 06/21/2020 >60     GFR  06/21/2020 >60  Calcium 06/21/2020 8.4     Total Protein 06/21/2020 6.1*    Albumin 06/21/2020 4.3     Albumin/Globulin Ratio 06/21/2020 2.4*    Total Bilirubin 06/21/2020 0.8     Alkaline Phosphatase 06/21/2020 35*    ALT 06/21/2020 16     AST 06/21/2020 16     Globulin 06/21/2020 1.8     Lipase 06/21/2020 37.0     Hep A IgM 06/21/2020 Non-reactive     Hep B Core Ab, IgM 06/21/2020 Non-reactive     Hep B S Ag Interp 06/21/2020 Non-reactive     Hep C Ab Interp 06/21/2020 Non-reactive    Admission on 02/24/2020, Discharged on 02/24/2020   Component Date Value    WBC 02/24/2020 3.6*    RBC 02/24/2020 4.51     Hemoglobin 02/24/2020 14.0     Hematocrit 02/24/2020 42.1     MCV 02/24/2020 93.2     MCH 02/24/2020 31.1     MCHC 02/24/2020 33.3     RDW 02/24/2020 13.1     Platelets 26/78/2653 259     MPV 02/24/2020 8.2     Protime 02/24/2020 11.3     INR 02/24/2020 0.97     aPTT 02/24/2020 30.8          ASSESSMENT/PLAN  1. History of Helicobacter pylori infection  GI doctor wishes to document eradication, evidently would prefer to do breath test rather than fecal antigen.  - H. Pylori Breath Test, Adult; Future    2. Radiculopathy, lumbosacral region  We will restart topiramate 25 mg at bedtime in conjunction with Lyrica 25 mg p.o. nightly. 3. Healthcare maintenance  Colonoscopy last month evidently normal.  CMP 4 months ago unremarkable other than minimally elevated bilirubin and albumin likely dehydration. TSH 1.7 A1c 5.6 vitamin D 43; lipid panel  HDL 77 at that time previously screened negative for hepatitis C and HIV. Would update some of these October 2021. Mammogram ordered in October pending due to Covid. Offer repeat Tdap December 2021, pneumococcal P 23, at follow-up. 4.  Bitemporal headache  Left mandibular molar shows signs of wear, wonder about malocclusion.   Small TMJ joint click, though patient does not notice that her headache is particularly associated with eating, and I cannot get a bruxism history. That said wonder if bite guard would be of benefit, as patient is back with her dentist.  Patient to schedule MRI previously ordered at her convenience. 5.  GERD. History of H. pylori. EGD August 2020 raise question of NUD though patient not on TCA but PPI Carafate. H. pylori detected on biopsy, subsequently treated. Able to pass 46 [place] dilator without mucosal tearing. Results of multiple biopsies not available in our system, 2 small patches proximal esophagus, 2 mm columnar epithelium island just above the Z-line biopsied. Return in about 3 months (around 5/11/2021). It was a pleasure to visit with Ms. Triana today. Answered all questions as best I could.   MD Tre   Time 39 minutes

## 2021-02-11 NOTE — PATIENT INSTRUCTIONS
Check w your dentist, mild TMJ may be fueling your headaches, and bite guard at night might reduce TMJ pain/headache.   H pylori breath ordered  lyrica 25 hs and topamax 25mg hs   Blood work waiting for you  MRI to be scheduled at your convenience

## 2021-02-12 LAB
ALBUMIN SERPL-MCNC: 4.5 G/DL (ref 3.4–5)
ANION GAP SERPL CALCULATED.3IONS-SCNC: 9 MMOL/L (ref 3–16)
BASOPHILS ABSOLUTE: 0 K/UL (ref 0–0.2)
BASOPHILS RELATIVE PERCENT: 0.7 %
BUN BLDV-MCNC: 15 MG/DL (ref 7–20)
CALCIUM SERPL-MCNC: 8.8 MG/DL (ref 8.3–10.6)
CHLORIDE BLD-SCNC: 105 MMOL/L (ref 99–110)
CO2: 26 MMOL/L (ref 21–32)
CREAT SERPL-MCNC: 0.5 MG/DL (ref 0.6–1.1)
EOSINOPHILS ABSOLUTE: 0.1 K/UL (ref 0–0.6)
EOSINOPHILS RELATIVE PERCENT: 2.1 %
GFR AFRICAN AMERICAN: >60
GFR NON-AFRICAN AMERICAN: >60
GLUCOSE BLD-MCNC: 89 MG/DL (ref 70–99)
HCT VFR BLD CALC: 41 % (ref 36–48)
HEMOGLOBIN: 13.5 G/DL (ref 12–16)
LYMPHOCYTES ABSOLUTE: 1.5 K/UL (ref 1–5.1)
LYMPHOCYTES RELATIVE PERCENT: 38.6 %
MCH RBC QN AUTO: 30.7 PG (ref 26–34)
MCHC RBC AUTO-ENTMCNC: 32.9 G/DL (ref 31–36)
MCV RBC AUTO: 93.5 FL (ref 80–100)
MONOCYTES ABSOLUTE: 0.3 K/UL (ref 0–1.3)
MONOCYTES RELATIVE PERCENT: 8.1 %
NEUTROPHILS ABSOLUTE: 1.9 K/UL (ref 1.7–7.7)
NEUTROPHILS RELATIVE PERCENT: 50.5 %
PDW BLD-RTO: 13.1 % (ref 12.4–15.4)
PHOSPHORUS: 2.8 MG/DL (ref 2.5–4.9)
PLATELET # BLD: 190 K/UL (ref 135–450)
PMV BLD AUTO: 9.3 FL (ref 5–10.5)
POTASSIUM SERPL-SCNC: 4.3 MMOL/L (ref 3.5–5.1)
RBC # BLD: 4.39 M/UL (ref 4–5.2)
SODIUM BLD-SCNC: 140 MMOL/L (ref 136–145)
WBC # BLD: 3.8 K/UL (ref 4–11)

## 2021-02-15 LAB — H PYLORI BREATH TEST: POSITIVE

## 2021-02-19 RX ORDER — FLUCONAZOLE 150 MG/1
150 TABLET ORAL ONCE
COMMUNITY
End: 2021-02-20 | Stop reason: SDUPTHER

## 2021-02-20 RX ORDER — FLUCONAZOLE 150 MG/1
150 TABLET ORAL ONCE
Qty: 1 TABLET | Refills: 0 | Status: SHIPPED | OUTPATIENT
Start: 2021-02-20 | End: 2021-02-20

## 2021-02-22 DIAGNOSIS — B37.9 YEAST INFECTION: Primary | ICD-10-CM

## 2021-02-22 RX ORDER — FLUCONAZOLE 150 MG/1
150 TABLET ORAL ONCE
Qty: 1 TABLET | Refills: 0 | Status: SHIPPED | OUTPATIENT
Start: 2021-02-22 | End: 2021-02-22

## 2021-03-02 DIAGNOSIS — R68.82 LOW LIBIDO: ICD-10-CM

## 2021-03-02 RX ORDER — ESTERIFIED ESTROGENS AND METHYLTESTOSTERONE 1.25; 2.5 MG/1; MG/1
TABLET, FILM COATED ORAL
Qty: 90 TABLET | Refills: 0 | Status: SHIPPED | OUTPATIENT
Start: 2021-03-02 | End: 2021-05-28

## 2021-03-03 ENCOUNTER — TELEPHONE (OUTPATIENT)
Dept: PRIMARY CARE CLINIC | Age: 56
End: 2021-03-03

## 2021-03-17 ENCOUNTER — HOSPITAL ENCOUNTER (OUTPATIENT)
Dept: MAMMOGRAPHY | Age: 56
Discharge: HOME | End: 2021-03-17
Payer: COMMERCIAL

## 2021-03-17 DIAGNOSIS — Z12.31 ENCOUNTER FOR SCREENING MAMMOGRAM FOR BREAST CANCER: ICD-10-CM

## 2021-03-17 PROCEDURE — 77067 SCR MAMMO BI INCL CAD: CPT

## 2021-03-31 ENCOUNTER — HOSPITAL ENCOUNTER (OUTPATIENT)
Dept: MAMMOGRAPHY | Age: 56
Discharge: HOME | End: 2021-03-31
Payer: COMMERCIAL

## 2021-03-31 DIAGNOSIS — R92.8 ABNORMAL MAMMOGRAM: ICD-10-CM

## 2021-03-31 PROCEDURE — G0279 TOMOSYNTHESIS, MAMMO: HCPCS

## 2021-04-05 ENCOUNTER — TELEPHONE (OUTPATIENT)
Dept: PRIMARY CARE CLINIC | Age: 56
End: 2021-04-05

## 2021-04-16 ENCOUNTER — TELEPHONE (OUTPATIENT)
Dept: PRIMARY CARE CLINIC | Age: 56
End: 2021-04-16

## 2021-04-16 DIAGNOSIS — N76.0 ACUTE VAGINITIS: Primary | ICD-10-CM

## 2021-04-16 RX ORDER — FLUCONAZOLE 150 MG/1
150 TABLET ORAL ONCE
Qty: 1 TABLET | Refills: 0 | Status: SHIPPED | OUTPATIENT
Start: 2021-04-16 | End: 2021-04-16

## 2021-05-12 DIAGNOSIS — M54.16 LUMBAR RADICULOPATHY: ICD-10-CM

## 2021-05-12 RX ORDER — PREGABALIN 25 MG/1
25 CAPSULE ORAL 2 TIMES DAILY
Qty: 60 CAPSULE | Refills: 5 | Status: SHIPPED | OUTPATIENT
Start: 2021-05-12 | End: 2021-11-29

## 2021-05-12 RX ORDER — PREGABALIN 25 MG/1
25 CAPSULE ORAL 2 TIMES DAILY
Qty: 60 CAPSULE | Refills: 5 | Status: CANCELLED | OUTPATIENT
Start: 2021-05-12 | End: 2022-05-12

## 2021-05-27 DIAGNOSIS — R68.82 LOW LIBIDO: ICD-10-CM

## 2021-05-28 RX ORDER — ESTERIFIED ESTROGENS AND METHYLTESTOSTERONE 1.25; 2.5 MG/1; MG/1
TABLET, FILM COATED ORAL
Qty: 90 TABLET | Refills: 0 | Status: SHIPPED | OUTPATIENT
Start: 2021-05-28 | End: 2021-09-02

## 2021-07-01 ENCOUNTER — TELEPHONE (OUTPATIENT)
Dept: INTERNAL MEDICINE CLINIC | Age: 56
End: 2021-07-01

## 2021-07-01 DIAGNOSIS — H65.00 ACUTE SEROUS OTITIS MEDIA, RECURRENCE NOT SPECIFIED, UNSPECIFIED LATERALITY: ICD-10-CM

## 2021-07-01 DIAGNOSIS — K59.04 CHRONIC IDIOPATHIC CONSTIPATION: ICD-10-CM

## 2021-07-01 DIAGNOSIS — K21.00 GASTROESOPHAGEAL REFLUX DISEASE WITH ESOPHAGITIS: ICD-10-CM

## 2021-07-01 NOTE — TELEPHONE ENCOUNTER
[facility] on 07/01/21 informing patient appointment has been cancelled, do to Dr. Joan Leyva are not seeing any patients, outside of [facility] location.

## 2021-07-01 NOTE — TELEPHONE ENCOUNTER
----- Message from [facility] sent at 2021  9:55 AM EDT -----  Subject: Appointment Request    Reason for Call: New Patient Request Appointment    QUESTIONS  Type of Appointment? New Patient/New to Provider  Reason for appointment request? Requested Provider unavailable - Markusyoon Gaston  Additional Information for Provider? Needs AWV, had allergies shot and   right arm swelled up. she saw Niraj Gayle before and would like to see her   again  ---------------------------------------------------------------------------  --------------  [address]  What is the best way for the office to contact you? OK to leave message on   voicemail  Preferred Call Back Phone Number? [phone]  ---------------------------------------------------------------------------  --------------  SCRIPT ANSWERS  Relationship to Patient? Self  Appointment reason? Establish Care/Find a provider  Is this the first appointment to establish care for a ? No  Have you been diagnosed with, awaiting test results for, or told that you   are suspected of having COVID-19 (Coronavirus)? (If patient has tested   negative or was tested as a requirement for work, school, or travel and   not based on symptoms, answer no)? No  Do you currently have flu-like symptoms including fever or chills, cough,   shortness of breath, difficulty breathing, or new loss of taste or smell? No  Have you had close contact with someone with COVID-19 in the last 14 days? No  (Service Expert  click yes below to proceed with [employer] As Usual   Scheduling)?  Yes

## 2021-07-02 RX ORDER — LANSOPRAZOLE 30 MG/1
CAPSULE, DELAYED RELEASE ORAL
Qty: 30 CAPSULE | Refills: 5 | Status: SHIPPED | OUTPATIENT
Start: 2021-07-02 | End: 2021-09-02

## 2021-07-02 RX ORDER — FEXOFENADINE HCL 180 MG/1
180 TABLET ORAL DAILY
Qty: 30 TABLET | Refills: 5 | Status: SHIPPED | OUTPATIENT
Start: 2021-07-02 | End: 2022-08-17

## 2021-07-02 RX ORDER — POLYETHYLENE GLYCOL 3350 17 G/17G
34 POWDER, FOR SOLUTION ORAL DAILY
Qty: 510 G | Refills: 11 | Status: SHIPPED | OUTPATIENT
Start: 2021-07-02 | End: 2022-07-08 | Stop reason: SDUPTHER

## 2021-07-07 ENCOUNTER — TELEPHONE (OUTPATIENT)
Dept: INTERNAL MEDICINE CLINIC | Age: 56
End: 2021-07-07

## 2021-07-07 NOTE — TELEPHONE ENCOUNTER
----- Message from Lonell Simmonds sent at 7/1/2021 12:20 PM EDT -----  Subject: Message to Provider    QUESTIONS  Information for Provider? patient called because she received a call from   Chayito Mcdaniel from the office and was returning the call if she could call the   patient back it would be greatly appreciated  ---------------------------------------------------------------------------  --------------  [address]  What is the best way for the office to contact you? OK to leave message on   voicemail  Preferred Call Back Phone Number? [phone]  ---------------------------------------------------------------------------  --------------  SCRIPT ANSWERS  Relationship to Patient?  Self

## 2021-07-08 ENCOUNTER — NURSE TRIAGE (OUTPATIENT)
Dept: OTHER | Facility: CLINIC | Age: 56
End: 2021-07-08

## 2021-07-08 NOTE — TELEPHONE ENCOUNTER
Reason for Disposition   MODERATE OR MILD pain that comes and goes (cramps) lasts > 24 hours    Answer Assessment - Initial Assessment Questions  1. LOCATION: \"Where does it hurt? \"       Upper abdomen, above belly button    2. RADIATION: \"Does the pain shoot anywhere else? \" (e.g., chest, back)      Denies    3. ONSET: \"When did the pain begin? \" (e.g., minutes, hours or days ago)       About 2 days ago, when started taking colon cleanse    4. SUDDEN: \"Gradual or sudden onset? \"      Cramping- gradually got worse    5. PATTERN \"Does the pain come and go, or is it constant? \"     - If constant: \"Is it getting better, staying the same, or worsening? \"       (Note: Constant means the pain never goes away completely; most serious pain is constant and it progresses)      - If intermittent: \"How long does it last?\" \"Do you have pain now? \"      (Note: Intermittent means the pain goes away completely between bouts)      States cramping    6. SEVERITY: \"How bad is the pain? \"  (e.g., Scale 1-10; mild, moderate, or severe)    - MILD (1-3): doesn't interfere with normal activities, abdomen soft and not tender to touch     - MODERATE (4-7): interferes with normal activities or awakens from sleep, tender to touch     - SEVERE (8-10): excruciating pain, doubled over, unable to do any normal activities       4/10    7. RECURRENT SYMPTOM: \"Have you ever had this type of abdominal pain before? \" If so, ask: \"When was the last time? \" and \"What happened that time? \"       History of GERD    8. CAUSE: \"What do you think is causing the abdominal pain? \"      Unsure    9. RELIEVING/AGGRAVATING FACTORS: \"What makes it better or worse? \" (e.g., movement, antacids, bowel movement)      Denies    10. OTHER SYMPTOMS: \"Has there been any vomiting, diarrhea, constipation, or urine problems? \"        Burning sensation in stomach and throat- history of GERD, states woke up this morning and tongue is brown    11.  PREGNANCY: \"Is there any chance you are pregnant? \" \"When was your last menstrual period? \"        n/a    Protocols used: ABDOMINAL PAIN - FEMALE-ADULT-[place]    Received call from Glenn Reeder at [facility] with Red Flag Complaint. Brief description of triage: Pt started taking colon cleanse 2 days ago for constipation, experiencing abdominal cramping 4/10, burning sensation in stomach and chest area (history of GERD), and states woke up this morning and tongue was brown. Triage indicates for patient to Be seen today/ [facility] as back up    Care advice provided, patient verbalizes understanding; denies any other questions or concerns; instructed to call back for any new or worsening symptoms. Writer provided warm transfer to [facility] for appointment scheduling. Attention Provider: Thank you for allowing me to participate in the care of your patient. The patient was connected to triage in response to information provided to the [facility]. Please do not respond through this encounter as the response is not directed to a shared pool.

## 2021-07-09 ENCOUNTER — OFFICE VISIT (OUTPATIENT)
Dept: PRIMARY CARE CLINIC | Age: 56
End: 2021-07-09
Payer: COMMERCIAL

## 2021-07-09 VITALS
WEIGHT: 163.2 LBS | BODY MASS INDEX: 26.34 KG/M2 | OXYGEN SATURATION: 98 % | DIASTOLIC BLOOD PRESSURE: 60 MMHG | SYSTOLIC BLOOD PRESSURE: 100 MMHG | TEMPERATURE: 97.7 F

## 2021-07-09 DIAGNOSIS — R10.9 ABDOMINAL CRAMPS: Primary | ICD-10-CM

## 2021-07-09 DIAGNOSIS — A04.8 H. PYLORI INFECTION: ICD-10-CM

## 2021-07-09 DIAGNOSIS — R10.9 ABDOMINAL CRAMPS: ICD-10-CM

## 2021-07-09 PROCEDURE — 99213 OFFICE O/P EST LOW 20 MIN: CPT | Performed by: INTERNAL MEDICINE

## 2021-07-09 RX ORDER — BISACODYL 10 MG
10 SUPPOSITORY, RECTAL RECTAL PRN
Qty: 6 SUPPOSITORY | Refills: 1 | Status: SHIPPED | OUTPATIENT
Start: 2021-07-09 | End: 2021-08-08

## 2021-07-09 RX ORDER — SODIUM PHOSPHATE, DIBASIC AND SODIUM PHOSPHATE, MONOBASIC 7; 19 G/133ML; G/133ML
1 ENEMA RECTAL
Qty: 1 BOTTLE | Refills: 1 | COMMUNITY
Start: 2021-07-09 | End: 2021-07-09

## 2021-07-09 RX ORDER — DICYCLOMINE HYDROCHLORIDE 10 MG/1
10 CAPSULE ORAL 4 TIMES DAILY
Qty: 60 CAPSULE | Refills: 1 | Status: SHIPPED | OUTPATIENT
Start: 2021-07-09

## 2021-07-09 SDOH — ECONOMIC STABILITY: FOOD INSECURITY: WITHIN THE PAST 12 MONTHS, YOU WORRIED THAT YOUR FOOD WOULD RUN OUT BEFORE YOU GOT MONEY TO BUY MORE.: NEVER TRUE

## 2021-07-09 SDOH — ECONOMIC STABILITY: FOOD INSECURITY: WITHIN THE PAST 12 MONTHS, THE FOOD YOU BOUGHT JUST DIDN'T LAST AND YOU DIDN'T HAVE MONEY TO GET MORE.: NEVER TRUE

## 2021-07-09 ASSESSMENT — SOCIAL DETERMINANTS OF HEALTH (SDOH): HOW HARD IS IT FOR YOU TO PAY FOR THE VERY BASICS LIKE FOOD, HOUSING, MEDICAL CARE, AND HEATING?: NOT HARD AT ALL

## 2021-07-09 NOTE — PROGRESS NOTES
2021    Ada Del Rio (:  1965) is a 48 y.o. female, here for evaluation of the following medical concerns:    No chief complaint on file. 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 pylori, pseudoclaudication improved but not resolved with lumbar laminectomy in 2017 still on Lyrica and Topamax, chronic constipation on Linzess 145 mg, who comes in for worsening constipation refractory to Linzess and miralax for which she undertook an OTC cleanse with some stool output, but since crampy abdominal pain and nausea without emesis, still passing flatus, unaffected by food. No rectal bleeding or fever. Not on narcotics, but has struggled with constipation in recent years despite stooling daily in her teens and twenties (against hirschsprungs). She is moving her bowels weekly it seems. Negative CT 2020 for the same concern, namely upper abdl pain after colon cleanse, though that time no constipation antecedent apparently. Review of Systems   Constitutional: Negative for activity change, appetite change, fatigue and unexpected weight change. HENT: Negative for dental problem, sinus pain, sore throat and trouble swallowing. Eyes: Negative for pain and visual disturbance. Respiratory: Negative for apnea, cough, chest tightness, shortness of breath and wheezing. Cardiovascular: Negative for chest pain and palpitations. Gastrointestinal: Negative for abdominal pain, blood in stool, constipation, diarrhea, nausea, rectal pain and vomiting. Endocrine: Negative for cold intolerance, heat intolerance, polydipsia, polyphagia and polyuria. Genitourinary: Negative for difficulty urinating, dysuria, flank pain, frequency, hematuria, pelvic pain, urgency, vaginal bleeding and vaginal discharge. Musculoskeletal: Negative for arthralgias, back pain, gait problem, joint swelling, myalgias, neck pain and neck stiffness.    Skin: Negative for color change and rash. Neurological: Negative for dizziness, tremors, syncope, speech difficulty, weakness, light-headedness and headaches. Hematological: Negative for adenopathy. Does not bruise/bleed easily. Psychiatric/Behavioral: Negative for agitation, behavioral problems, decreased concentration, sleep disturbance and suicidal ideas. The patient is not nervous/anxious and is not hyperactive. Prior to Visit Medications    Medication Sig Taking? Authorizing Provider   lansoprazole (PREVACID) 30 MG delayed release capsule Take 1 capsule by mouth once daily  Nick Leavitt MD   fexofenadine (ALLEGRA) 180 MG tablet Take 1 tablet by mouth daily Stop zyrtec  Nick Leavitt MD   polyethylene glycol (SM CLEARLAX) 17 GM/SCOOP powder Take 34 g by mouth daily  Nick Leavitt MD   Est Estrogens-Methyltest DS 1.25-2.5 MG TABS Take 1 tablet by mouth once daily  Nick Leavitt MD   Cholecalciferol (VITAMIN D) 50 MCG (2000 UT) CAPS capsule Take 1 capsule by mouth once daily  Nick Leavitt MD   pregabalin (LYRICA) 25 MG capsule Take 1 capsule by mouth 2 times daily.   Nick Leavitt MD   topiramate (TOPAMAX) 25 MG tablet Take 1 tablet by mouth nightly  Nick Leavitt MD   Cholecalciferol (VITAMIN D3) 125 MCG (5000 UT) CAPS Take 1 capsule by mouth daily  David Daniel MD   zinc 50 MG CAPS Take 50 mg by mouth daily for 14 days  David Daniel MD   Baclofen (LIORESAL) 5 MG tablet Take 1 tablet by mouth 3 times daily as needed (back pain)  David Daniel MD   EPINEPHrine (EPIPEN 2-DAMIAN) 0.3 MG/0.3ML SOAJ injection Inject 0.3 mLs into the muscle once for 1 dose Use as directed for allergic reaction  David Daniel MD   Rimegepant Sulfate (NURTEC) 75 MG TBDP Take 1 tablet by mouth daily  David Daniel MD   sucralfate (CARAFATE) 1 GM tablet Take 1 tablet by mouth 4 times daily  David Daniel MD   calcium carbonate-vitamin D (CALCIUM 600+D) 600-200 MG-UNIT TABS Take 1 each by mouth 2 times daily  Guanakito Roach MD   albuterol sulfate HFA (VENTOLIN HFA) 108 (90 Base) MCG/ACT inhaler Inhale 2 puffs into the lungs every 6 hours as needed for Wheezing  Guanakito Roach MD   ondansetron (ZOFRAN) 4 MG tablet Take 1-2 tablets by mouth every 12 hours as needed for Nausea  Basia Wetzel APRN - CNP   triamcinolone (KENALOG) 0.05 % OINT ointment Apply topically 2 times daily To back of the neck  Guanakito Roach MD   tacrolimus (PROTOPIC) 0.1 % ointment Apply to affected area BID  Jesus Zavala MD   azelastine (ASTELIN) 0.1 % nasal spray 1 spray by Nasal route 2 times daily Use in each nostril as directed  Guanakito Roach MD        Allergies   Allergen Reactions    Fruit Acid Concentrate      Tomato, strawberries    Penicillins Hives    Pollen Extract     Tomato Anaphylaxis     Vomiting  Vomiting    Bee Pollen     Fruit & Vegetable Daily  [Cholestatin]     Fruit Extracts     Influenza Vac Split Quad     Meloxicam      Rapid weight gain    Sumatriptan      Sick weak.  Versed [Midazolam] Other (See Comments)     Extremely sleepy with a dose of 2 mg. Reversal used To wake patient up. She states this happened at her colonoscopy the week before also.  Influenza Vaccines Rash     Hives with vaccine.        Past Medical History:   Diagnosis Date    Allergic rhinitis     Chronic back pain     l4-L5 lumbar spinal stenosis    Constipation     Diverticulosis     Hyperlipidemia     Intractable cluster headache syndrome     Mild intermittent asthma without complication 3/1/4379       Past Surgical History:   Procedure Laterality Date    COLONOSCOPY N/A 9/13/2019    COLONOSCOPY biopsy sigmoid colon polyp performed by Elizabeth Carballo MD at [address]    endometriosis    HYSTERECTOMY, TOTAL ABDOMINAL      one ovary remains    LUMBAR FUSION Left 11/10/2017    LEFT L4-L5, L5-S1 TRANSFORAMINAL LUMBAR INTERBODY FUSION WITH with CT navigation    LUMBAR NERVE BLOCK N/A 9/18/2019    L3L4 INTERLAMINAR EPIDURAL STEROID INJECTION WITH FLUOROSCOPY performed by Garett Adam MD at [address] Right 8/5/2019    RIGHT L3 AND L4 TRANSFORAMINAL EPIDURAL STEROID INJECTION WITH FLUOROSCOPY performed by Garett Adam MD at [address] [facility] SALPINGO-OOPHORECTOMY      unilat - rt - [address] STIMULATOR SURGERY N/A 2/24/2020    SPINAL CORD STIMULATOR TRIAL WITH FLUOROSCOPY (53105, [id number], [id number]) -  [facility] performed by Garett Adam MD at [address] [facility] ENDOSCOPY N/A 9/13/2019    EGD gastric BIOPSY and GE Junction biopsy and esophageal brush for radha performed by Hipolito Brooks MD at [address] [facility] Marital status:      Spouse name: Not on file    Number of children: Not on file    Years of education: Not on file    Highest education level: Not on file   Occupational History    Not on file   Tobacco Use    Smoking status: Never Smoker    Smokeless tobacco: Never Used   Vaping Use    Vaping Use: Never used   Substance and Sexual Activity    Alcohol use: Yes     Comment: 3-4 times a year    Drug use: No    Sexual activity: Yes     Partners: Male   Other Topics Concern    Not on file   Social History Narrative    Not on file     Social Determinants of Health     Financial Resource Strain:     Difficulty of Paying Living Expenses:    Food Insecurity:     Worried About [address] in the Last Year:     [address] in the Last Year:    Transportation Needs:     Lack of Transportation (Medical):      Lack of Transportation (Non-Medical):    Physical Activity:     Days of Exercise per Week:     Minutes of Exercise per Session:    Stress:     Feeling of Stress :    Social Connections:     Frequency of Communication with Friends and Family:     Frequency of Social Gatherings with Friends and Family:     Attends Caodaism Services:     Active Member of Clubs or Organizations:     Attends Club or Organization Meetings:     Marital Status:    Intimate Partner Violence:     Fear of Current or Ex-Partner:     Emotionally Abused:     Physically Abused:     Sexually Abused:         Family History   Problem Relation Age of Onset    Heart Disease Mother     High Blood Pressure Mother     Diabetes Mother     Heart Disease Father     Stroke Father     High Blood Pressure Father     Heart Disease Paternal Grandmother        There were no vitals filed for this visit. Estimated body mass index is 25.82 kg/m² as calculated from the following:    Height as of 2/11/21: 5' 6\" (1.676 m). Weight as of 2/11/21: 160 lb (72.6 kg). PHYSICAL EXAM  GENERAL:  Pleasant  female who looks her stated age, awake alert and oriented x3, no acute distress. .  ABDOMEN:  Soft,tender without distention or rebound. Diminished bowel sounds. No guarding. No masses. PSYCH:  No psychomotor retardation or agitation. Good eye contact. Unrestricted affect range. Mood congruent with affect. Linear thought.     LABS  Lab Review   Orders Only on 02/11/2021   Component Date Value    Sodium 02/11/2021 140     Potassium 02/11/2021 4.3     Chloride 02/11/2021 105     CO2 02/11/2021 26     Anion Gap 02/11/2021 9     Glucose 02/11/2021 89     BUN 02/11/2021 15     CREATININE 02/11/2021 0.5*    GFR Non- 02/11/2021 >60     GFR  02/11/2021 >60     Calcium 02/11/2021 8.8     Phosphorus 02/11/2021 2.8     Albumin 02/11/2021 4.5     WBC 02/11/2021 3.8*    RBC 02/11/2021 4.39     Hemoglobin 02/11/2021 13.5     Hematocrit 02/11/2021 41.0     MCV 02/11/2021 93.5     MCH 02/11/2021 30.7     MCHC 02/11/2021 32.9     RDW 02/11/2021 13.1     Platelets 60/81/7327 190     MPV 02/11/2021 9.3     Neutrophils % 02/11/2021 50.5     Lymphocytes % 02/11/2021 38.6     Monocytes % 02/11/2021 8.1     Eosinophils % 02/11/2021 2.1     Basophils % 02/11/2021 0.7     Neutrophils Absolute 02/11/2021 1.9     Lymphocytes Absolute 02/11/2021 1.5     Monocytes Absolute 02/11/2021 0.3     Eosinophils Absolute 02/11/2021 0.1     Basophils Absolute 02/11/2021 0.0     H Pylori Breath Test 02/11/2021 Positive*   Office Visit on 02/06/2021   Component Date Value    SARS-CoV-2 02/06/2021 Not Detected    Office Visit on 01/30/2021   Component Date Value    SARS-CoV-2, GENA 01/30/2021 INVALID*   Orders Only on 12/01/2020   Component Date Value    SARS-CoV-2, IgG 12/01/2020 Negative    Orders Only on 10/14/2020   Component Date Value    Vitamin B-12 10/14/2020 820     Cholesterol, Total 10/14/2020 212*    Triglycerides 10/14/2020 63     HDL 10/14/2020 77*    LDL Calculated 10/14/2020 122*    VLDL Cholesterol Calcula* 10/14/2020 13     Color, UA 10/14/2020 YELLOW     Clarity, UA 10/14/2020 Clear     Glucose, Ur 10/14/2020 Negative     Bilirubin Urine 10/14/2020 Negative     Ketones, Urine 10/14/2020 TRACE*    Specific [place], UA 10/14/2020 1.023     Blood, Urine 10/14/2020 Negative     pH, UA 10/14/2020 6.0     Protein, UA 10/14/2020 Negative     Urobilinogen, Urine 10/14/2020 1.0     Nitrite, Urine 10/14/2020 Negative     Leukocyte Esterase, Urine 10/14/2020 Negative     Microscopic Examination 10/14/2020 Not Indicated     Urine Type 10/14/2020 Cleancatch     Vit D, 25-Hydroxy 10/14/2020 42.5     WBC 10/14/2020 3.8*    RBC 10/14/2020 5.02     Hemoglobin 10/14/2020 15.4     Hematocrit 10/14/2020 46.3     MCV 10/14/2020 92.2     MCH 10/14/2020 30.7     MCHC 10/14/2020 33.3     RDW 10/14/2020 13.2     Platelets 76/21/8378 218     MPV 10/14/2020 8.6     Neutrophils % 10/14/2020 56.1     Lymphocytes % 10/14/2020 35.9     Monocytes % 10/14/2020 6.6     Eosinophils % 10/14/2020 1.0     Basophils % 10/14/2020 0.4     Neutrophils Absolute 10/14/2020 2.1     Lymphocytes Absolute 10/14/2020 1.4     Monocytes Absolute 10/14/2020 0.3     Eosinophils Absolute 10/14/2020 0.0     Basophils Absolute 10/14/2020 0.0     Sodium 10/14/2020 141     Potassium 10/14/2020 4.0     Chloride 10/14/2020 106     CO2 10/14/2020 24     Anion Gap 10/14/2020 11     Glucose 10/14/2020 96     BUN 10/14/2020 15     CREATININE 10/14/2020 <0.5*    GFR Non- 10/14/2020 >60     GFR  10/14/2020 >60     Calcium 10/14/2020 9.7     Total Protein 10/14/2020 7.0     Albumin 10/14/2020 5.1*    Albumin/Globulin Ratio 10/14/2020 2.7*    Total Bilirubin 10/14/2020 1.5*    Alkaline Phosphatase 10/14/2020 49     ALT 10/14/2020 21     AST 10/14/2020 18     Globulin 10/14/2020 1.9     Hemoglobin A1C 10/14/2020 5.6     eAG 10/14/2020 114.0     TSH Reflex FT4 10/14/2020 1.66     Phosphorus 10/14/2020 2.6    Admission on 06/21/2020, Discharged on 06/22/2020   Component Date Value    WBC 06/21/2020 3.6*    RBC 06/21/2020 4.27     Hemoglobin 06/21/2020 13.4     Hematocrit 06/21/2020 40.1     MCV 06/21/2020 93.9     MCH 06/21/2020 31.4     MCHC 06/21/2020 33.4     RDW 06/21/2020 13.3     Platelets 02/45/1376 210     MPV 06/21/2020 8.0     Neutrophils % 06/21/2020 34.5     Lymphocytes % 06/21/2020 56.9     Monocytes % 06/21/2020 6.4     Eosinophils % 06/21/2020 1.8     Basophils % 06/21/2020 0.4     Neutrophils Absolute 06/21/2020 1.2*    Lymphocytes Absolute 06/21/2020 2.1     Monocytes Absolute 06/21/2020 0.2     Eosinophils Absolute 06/21/2020 0.1     Basophils Absolute 06/21/2020 0.0     Sodium 06/21/2020 142     Potassium reflex Magnesi* 06/21/2020 3.7     Chloride 06/21/2020 108     CO2 06/21/2020 21     Anion Gap 06/21/2020 13     Glucose 06/21/2020 87     BUN 06/21/2020 13     CREATININE 06/21/2020 0.6     GFR Non- 06/21/2020 >60     GFR  06/21/2020 >60  Calcium 06/21/2020 8.4     Total Protein 06/21/2020 6.1*    Albumin 06/21/2020 4.3     Albumin/Globulin Ratio 06/21/2020 2.4*    Total Bilirubin 06/21/2020 0.8     Alkaline Phosphatase 06/21/2020 35*    ALT 06/21/2020 16     AST 06/21/2020 16     Globulin 06/21/2020 1.8     Lipase 06/21/2020 37.0     Hep A IgM 06/21/2020 Non-reactive     Hep B Core Ab, IgM 06/21/2020 Non-reactive     Hep B S Ag Interp 06/21/2020 Non-reactive     Hep C Ab Interp 06/21/2020 Non-reactive          ASSESSMENT/PLAN  1. History of Helicobacter pylori infection  EGD 8/10/20 for abnl CT (thick antrum) H pyl positive 2/2021, some non nodular inlet patches were bx'd, pathol rpt unavail. GI re eradicated. Should re test.  - H. Pylori Breath Test, Adult; Future    2. Radiculopathy, lumbosacral region  Restarted topiramate 25 mg at bedtime in conjunction with Lyrica 25 mg p.o. nightly. 3. Healthcare maintenance  Colonoscopy 2021 evidently normal.  TSH 1.7 A1c 5.6 vitamin D 43; lipid panel  HDL 77 at that time previously screened negative for hepatitis C and HIV. Would update some of these October 2021. Mammogram 3/2021 normal.    Offer repeat Tdap December 2021, pneumococcal P 23, at follow-up. 4.  Bitemporal headache  Left mandibular molar shows signs of wear, wonder about malocclusion. Small TMJ joint click, though patient does not notice that her headache is particularly associated with eating, and I cannot get a bruxism history. That said wonder if bite guard would be of benefit, as patient is back with her dentist.  Patient chose not to pursue MRI previously ordered at her convenience. 5.  GERD. History of H. pylori. EGD August 2020 raise question of NUD though patient not on TCA but PPI Carafate. H. pylori detected on biopsy, subsequently treated. Able to pass 46 Lithuanian dilator without mucosal tearing.   Results of multiple biopsies not available in our system, 2 small patches proximal esophagus, 2 mm columnar epithelium island just above the Z-line biopsied. 6. Constipation. Presumptive constipation predominant IBS. Colonoscopy 9/3/19 to the cecum but not TI found extremely long, redundant colon with excision small rectal polyp. Bacterial overgrowth possible, visceral hypersensitivity, NUD, biliary disease in ddx. Start with Fleets enema followed by dulcolax, may need AXR assess stool burden and trial of lactulose vs Zelnorm vs Motegrity. Address H pylori, above. Trial nortripthyline if other interventions ineffective or unaffordable. May involve Dr Tejinder Vazquez again. No follow-ups on file. It was a pleasure to visit with Ms. Triana today. Answered all questions as best I could.   Trey Sepulveda MD   Time 22minutes

## 2021-07-09 NOTE — PATIENT INSTRUCTIONS
1. Labs today  2 Fleets enema  3.  then dulcolax suppository daily for 5 days in a row  4.  CT ED if worsens

## 2021-07-10 LAB
A/G RATIO: 2.4 (ref 1.1–2.2)
ALBUMIN SERPL-MCNC: 4.6 G/DL (ref 3.4–5)
ALP BLD-CCNC: 43 U/L (ref 40–129)
ALT SERPL-CCNC: 29 U/L (ref 10–40)
AMYLASE: 64 U/L (ref 25–115)
ANION GAP SERPL CALCULATED.3IONS-SCNC: 9 MMOL/L (ref 3–16)
AST SERPL-CCNC: 23 U/L (ref 15–37)
BILIRUB SERPL-MCNC: 1.1 MG/DL (ref 0–1)
BUN BLDV-MCNC: 11 MG/DL (ref 7–20)
CALCIUM SERPL-MCNC: 9 MG/DL (ref 8.3–10.6)
CHLORIDE BLD-SCNC: 106 MMOL/L (ref 99–110)
CO2: 25 MMOL/L (ref 21–32)
CREAT SERPL-MCNC: 0.5 MG/DL (ref 0.6–1.1)
GFR AFRICAN AMERICAN: >60
GFR NON-AFRICAN AMERICAN: >60
GLOBULIN: 1.9 G/DL
GLUCOSE BLD-MCNC: 85 MG/DL (ref 70–99)
LACTIC ACID: 0.9 MMOL/L (ref 0.4–2)
LIPASE: 19 U/L (ref 13–60)
POTASSIUM SERPL-SCNC: 4.4 MMOL/L (ref 3.5–5.1)
SODIUM BLD-SCNC: 140 MMOL/L (ref 136–145)
TOTAL PROTEIN: 6.5 G/DL (ref 6.4–8.2)

## 2021-08-18 ENCOUNTER — OFFICE VISIT (OUTPATIENT)
Dept: PRIMARY CARE CLINIC | Age: 56
End: 2021-08-18
Payer: COMMERCIAL

## 2021-08-18 VITALS
TEMPERATURE: 98.1 F | SYSTOLIC BLOOD PRESSURE: 132 MMHG | WEIGHT: 162 LBS | HEIGHT: 66 IN | HEART RATE: 76 BPM | OXYGEN SATURATION: 99 % | BODY MASS INDEX: 26.03 KG/M2 | DIASTOLIC BLOOD PRESSURE: 87 MMHG

## 2021-08-18 DIAGNOSIS — M25.551 RIGHT HIP PAIN: ICD-10-CM

## 2021-08-18 DIAGNOSIS — J01.01 ACUTE RECURRENT MAXILLARY SINUSITIS: ICD-10-CM

## 2021-08-18 DIAGNOSIS — M48.062 PSEUDOCLAUDICATION: Primary | ICD-10-CM

## 2021-08-18 PROCEDURE — 99214 OFFICE O/P EST MOD 30 MIN: CPT | Performed by: INTERNAL MEDICINE

## 2021-08-18 RX ORDER — AZITHROMYCIN 250 MG/1
250 TABLET, FILM COATED ORAL SEE ADMIN INSTRUCTIONS
Qty: 6 TABLET | Refills: 0 | Status: SHIPPED | OUTPATIENT
Start: 2021-08-18 | End: 2021-08-23

## 2021-08-18 NOTE — PROGRESS NOTES
2021    Juan M Melchor (:  1965) is a 48 y.o. female, here for evaluation of the following medical concerns:    No chief complaint on file. 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 pylori, pseudoclaudication improved but not resolved with lumbar laminectomy in 2017 still requiring Lyrica and Topamax, chronic constipation on Linzess 145 mg, who comes in with several concerns:  1. Sinusitis. Patient describes greater than 7-10-day history of nasal congestion pressure pain cough occasionally productive of green sputum, without fever dyspnea or itchy eyes dental thermal sensitivity or tooth pain. No headache anosmia GI changes rash. Currently not vaccinated for Covid, has not done Covid test since onset of symptoms. 2.  FMLA renewal.  3.  Right buttock pain. Patient reports 1 month history of pain in her but that she describes as a \"knot,\" unrelated to exercise. Has been waking from sleep. She is worried because she had a prior native hip septic arthritis per her report. No recent instrumentation including dental cleaning. No fevers. No joint warmth or swelling that she has noted. She has increased her Lyrica baclofen and turmeric with some benefit. She has not contacted her orthopedics team (spine, hip) about the symptoms. Review of Systems   Constitutional: Negative for activity change, appetite change, fatigue and unexpected weight change. HENT: Negative for dental problem, sinus pain, sore throat and trouble swallowing. Eyes: Negative for pain and visual disturbance. Respiratory: Negative for apnea, cough, chest tightness, shortness of breath and wheezing. Cardiovascular: Negative for chest pain and palpitations. Gastrointestinal: Negative for abdominal pain, blood in stool, constipation, diarrhea, nausea, rectal pain and vomiting.    Endocrine: Negative for cold intolerance, heat intolerance, polydipsia, Sulfate (NURTEC) 75 MG TBDP Take 1 tablet by mouth daily  Patient not taking: Reported on 7/9/2021  Katherine Turner MD   sucralfate (CARAFATE) 1 GM tablet Take 1 tablet by mouth 4 times daily  Katherine Turner MD   calcium carbonate-vitamin D (CALCIUM 600+D) 600-200 MG-UNIT TABS Take 1 each by mouth 2 times daily  Katherine Turner MD   albuterol sulfate HFA (VENTOLIN HFA) 108 (90 Base) MCG/ACT inhaler Inhale 2 puffs into the lungs every 6 hours as needed for Wheezing  Katherine Turner MD   ondansetron (ZOFRAN) 4 MG tablet Take 1-2 tablets by mouth every 12 hours as needed for Nausea  HANS Velásquez CNP   triamcinolone (KENALOG) 0.05 % OINT ointment Apply topically 2 times daily To back of the neck  Katherine Turner MD   tacrolimus (PROTOPIC) 0.1 % ointment Apply to affected area BID  Rosa Knapp MD   azelastine (ASTELIN) 0.1 % nasal spray 1 spray by Nasal route 2 times daily Use in each nostril as directed  Katherine Turner MD        Allergies   Allergen Reactions    Fruit Acid Concentrate      Tomato, strawberries    Penicillins Hives    Pollen Extract     Tomato Anaphylaxis     Vomiting  Vomiting    Bee Pollen     Fruit & Vegetable Daily  [Cholestatin]     Fruit Extracts     Influenza Vac Split Quad     Meloxicam      Rapid weight gain    Sumatriptan      Sick weak.  Versed [Midazolam] Other (See Comments)     Extremely sleepy with a dose of 2 mg. Reversal used To wake patient up. She states this happened at her colonoscopy the week before also.  Influenza Vaccines Rash     Hives with vaccine.        Past Medical History:   Diagnosis Date    Allergic rhinitis     Chronic back pain     l4-L5 lumbar spinal stenosis    Constipation     Diverticulosis     Hyperlipidemia     Intractable cluster headache syndrome     Mild intermittent asthma without complication 8/5/9507       Past Surgical History:   Procedure Laterality Date    COLONOSCOPY N/A 9/13/2019    COLONOSCOPY biopsy sigmoid colon polyp performed by Tere Solis MD at [address]    endometriosis    HYSTERECTOMY, TOTAL ABDOMINAL      one ovary remains    LUMBAR FUSION Left 11/10/2017    LEFT L4-L5, L5-S1 TRANSFORAMINAL LUMBAR INTERBODY FUSION WITH with CT navigation    LUMBAR NERVE BLOCK N/A 9/18/2019    L3L4 INTERLAMINAR EPIDURAL STEROID INJECTION WITH FLUOROSCOPY performed by Nakul Guillen MD at [address] [facility] Right 8/5/2019    RIGHT L3 AND L4 TRANSFORAMINAL EPIDURAL STEROID INJECTION WITH FLUOROSCOPY performed by Nakul Guillen MD at [address] [facility] SALPINGO-OOPHORECTOMY      unilat - rt - [address] STIMULATOR SURGERY N/A 2/24/2020    SPINAL CORD STIMULATOR TRIAL WITH FLUOROSCOPY (46886, [id number], [id number]) -  Hannah Gaston performed by Nakul Guillen MD at [address] [facility] ENDOSCOPY N/A 9/13/2019    EGD gastric BIOPSY and GE Junction biopsy and esophageal brush for radha performed by Tere Solis MD at [address] [facility] Marital status:      Spouse name: Not on file    Number of children: Not on file    Years of education: Not on file    Highest education level: Not on file   Occupational History    Not on file   Tobacco Use    Smoking status: Never Smoker    Smokeless tobacco: Never Used   Vaping Use    Vaping Use: Never used   Substance and Sexual Activity    Alcohol use: Yes     Comment: 3-4 times a year    Drug use: No    Sexual activity: Yes     Partners: Male   Other Topics Concern    Not on file   Social History Narrative    Not on file     Social Determinants of Health     Financial Resource Strain: Low Risk     Difficulty of Paying Living Expenses: Not hard at all   Food Insecurity: No Food Insecurity    Worried About [address] in the Last Year: Never true    [facility] in the Last Year: Never true Transportation Needs:     Lack of Transportation (Medical):  Lack of Transportation (Non-Medical):    Physical Activity:     Days of Exercise per Week:     Minutes of Exercise per Session:    Stress:     Feeling of Stress :    Social Connections:     Frequency of Communication with Friends and Family:     Frequency of Social Gatherings with Friends and Family:     Attends Religion Services:     Active Member of Clubs or Organizations:     Attends Club or Organization Meetings:     Marital Status:    Intimate Partner Violence:     Fear of Current or Ex-Partner:     Emotionally Abused:     Physically Abused:     Sexually Abused:         Family History   Problem Relation Age of Onset    Heart Disease Mother     High Blood Pressure Mother     Diabetes Mother     Heart Disease Father     Stroke Father     High Blood Pressure Father     Heart Disease Paternal Grandmother        There were no vitals filed for this visit. Estimated body mass index is 26.34 kg/m² as calculated from the following:    Height as of 2/11/21: 5' 6\" (1.676 m). Weight as of 7/9/21: 163 lb 3.2 oz (74 kg). PHYSICAL EXAM  GENERAL:  Pleasant  female who looks her stated age, awake alert and oriented x3, no acute distress. HEENT: Tympanic membranes appear normal.  Right maxillary sinus tenderness. Clear to yellow rhinorrhea in the nares, oropharynx clear. NECK: Supple nontender. No neck masses. LYMPH: No cervical or supraclavicular lymphadenopathy. LUNG: Clear to auscultation bilaterally. ABDOMEN:  Soft,tender without distention or rebound. Diminished bowel sounds. No guarding. No masses. EXTREMITIES: Warm and well perfused. 2+ pulses. No clubbing cyanosis or edema. MUSCULOSKELETAL:  PSYCH:  No psychomotor retardation or agitation. Good eye contact. Unrestricted affect range. Mood congruent with affect. Linear thought.     LABS  Lab Review   Orders Only on 07/09/2021   Component Date Value    ALLERGEN SEE NOTE 07/09/2021 See Note    Orders Only on 07/09/2021   Component Date Value    Candida Albicans 07/09/2021 <0.10    Orders Only on 07/09/2021   Component Date Value    Diphtheria IgG Ab 07/09/2021 0.4     Tetanus IgG Ab 07/09/2021 4.1    Orders Only on 07/09/2021   Component Date Value    Complement Activity, Tot* 07/09/2021 27.20*   Orders Only on 07/09/2021   Component Date Value    IgG 1 07/09/2021 414     IgG 2 07/09/2021 179     IgG 3 07/09/2021 8*    IgG 4 07/09/2021 3    Orders Only on 07/09/2021   Component Date Value    IgM 07/09/2021 37.0*   Orders Only on 07/09/2021   Component Date Value    IgA 07/09/2021 77.0    Orders Only on 07/09/2021   Component Date Value    WBC 07/09/2021 2.8*    RBC 07/09/2021 4.49     Hemoglobin 07/09/2021 14.4     Hematocrit 07/09/2021 42.1     MCV 07/09/2021 93.7     MCH 07/09/2021 32.0     MCHC 07/09/2021 34.2     RDW 07/09/2021 13.4     Platelets 07/18/7989 200     MPV 07/09/2021 8.8     Neutrophils % 07/09/2021 48.9     Lymphocytes % 07/09/2021 41.3     Monocytes % 07/09/2021 5.9     Eosinophils % 07/09/2021 3.3     Basophils % 07/09/2021 0.6     Neutrophils Absolute 07/09/2021 1.4*    Lymphocytes Absolute 07/09/2021 1.2     Monocytes Absolute 07/09/2021 0.2     Eosinophils Absolute 07/09/2021 0.1     Basophils Absolute 07/09/2021 0.0    Orders Only on 07/09/2021   Component Date Value    Amylase 07/09/2021 64     Lactic Acid 07/09/2021 0.9     Lipase 07/09/2021 19.0     Sodium 07/09/2021 140     Potassium 07/09/2021 4.4     Chloride 07/09/2021 106     CO2 07/09/2021 25     Anion Gap 07/09/2021 9     Glucose 07/09/2021 85     BUN 07/09/2021 11     CREATININE 07/09/2021 0.5*    GFR Non- 07/09/2021 >60     GFR  07/09/2021 >60     Calcium 07/09/2021 9.0     Total Protein 07/09/2021 6.5     Albumin 07/09/2021 4.6     Albumin/Globulin Ratio 07/09/2021 2.4*    Total Bilirubin 07/09/2021 1.1*    Alkaline Phosphatase 07/09/2021 43     ALT 07/09/2021 29     AST 07/09/2021 23     Globulin 07/09/2021 1.9    Orders Only on 02/11/2021   Component Date Value    Sodium 02/11/2021 140     Potassium 02/11/2021 4.3     Chloride 02/11/2021 105     CO2 02/11/2021 26     Anion Gap 02/11/2021 9     Glucose 02/11/2021 89     BUN 02/11/2021 15     CREATININE 02/11/2021 0.5*    GFR Non- 02/11/2021 >60     GFR  02/11/2021 >60     Calcium 02/11/2021 8.8     Phosphorus 02/11/2021 2.8     Albumin 02/11/2021 4.5     WBC 02/11/2021 3.8*    RBC 02/11/2021 4.39     Hemoglobin 02/11/2021 13.5     Hematocrit 02/11/2021 41.0     MCV 02/11/2021 93.5     MCH 02/11/2021 30.7     MCHC 02/11/2021 32.9     RDW 02/11/2021 13.1     Platelets 88/60/5012 190     MPV 02/11/2021 9.3     Neutrophils % 02/11/2021 50.5     Lymphocytes % 02/11/2021 38.6     Monocytes % 02/11/2021 8.1     Eosinophils % 02/11/2021 2.1     Basophils % 02/11/2021 0.7     Neutrophils Absolute 02/11/2021 1.9     Lymphocytes Absolute 02/11/2021 1.5     Monocytes Absolute 02/11/2021 0.3     Eosinophils Absolute 02/11/2021 0.1     Basophils Absolute 02/11/2021 0.0     H Pylori Breath Test 02/11/2021 Positive*   There may be more visits with results that are not included. ASSESSMENT/PLAN  1. [facility] paperwork      2. Radiculopathy, lumbosacral region. Postlaminectomy syndrome. History of native hip septic arthritis. Patient has chronic low back pain burning, with radiation down the right leg. Status post left L4-S1 fusion 2017 spinal cord stimulator without benefit 2020. Restarted topiramate 25 mg at bedtime in conjunction with Lyrica 25 mg p.o. nightly at last visit, continue baclofen Lyrica turmeric will need prescription to support and she will let us know. Will obtain updated plain film views hip and spine, CRP.     3. Healthcare maintenance  Colonoscopy 2021 evidently

## 2021-08-18 NOTE — PATIENT INSTRUCTIONS
1. Get the covid nasal  test  2. Azithromycin for sinusitis  3. Will call when FMLA available  4.  Blood test today checking for infection

## 2021-08-24 ENCOUNTER — TELEPHONE (OUTPATIENT)
Dept: PRIMARY CARE CLINIC | Age: 56
End: 2021-08-24

## 2021-09-01 DIAGNOSIS — R68.82 LOW LIBIDO: ICD-10-CM

## 2021-09-01 DIAGNOSIS — K21.00 GASTROESOPHAGEAL REFLUX DISEASE WITH ESOPHAGITIS: ICD-10-CM

## 2021-09-02 RX ORDER — LANSOPRAZOLE 30 MG/1
CAPSULE, DELAYED RELEASE ORAL
Qty: 30 CAPSULE | Refills: 3 | Status: SHIPPED | OUTPATIENT
Start: 2021-09-02 | End: 2022-03-02 | Stop reason: SDUPTHER

## 2021-09-02 RX ORDER — ESTERIFIED ESTROGENS AND METHYLTESTOSTERONE 1.25; 2.5 MG/1; MG/1
TABLET, FILM COATED ORAL
Qty: 90 TABLET | Refills: 1 | Status: SHIPPED | OUTPATIENT
Start: 2021-09-02 | End: 2022-04-06

## 2021-11-08 RX ORDER — TOPIRAMATE 25 MG/1
25 TABLET ORAL NIGHTLY
Qty: 60 TABLET | Refills: 1 | Status: SHIPPED | OUTPATIENT
Start: 2021-11-08 | End: 2022-02-10

## 2022-01-15 DIAGNOSIS — M48.061 SPINAL STENOSIS OF LUMBAR REGION WITHOUT NEUROGENIC CLAUDICATION: ICD-10-CM

## 2022-01-17 RX ORDER — BACLOFEN 5 MG/1
TABLET ORAL
Qty: 90 TABLET | Refills: 0 | Status: SHIPPED | OUTPATIENT
Start: 2022-01-17 | End: 2022-04-06

## 2022-03-02 ENCOUNTER — TELEPHONE (OUTPATIENT)
Dept: ADMINISTRATIVE | Age: 57
End: 2022-03-02

## 2022-03-02 DIAGNOSIS — K21.00 GASTROESOPHAGEAL REFLUX DISEASE WITH ESOPHAGITIS: ICD-10-CM

## 2022-03-02 RX ORDER — LANSOPRAZOLE 30 MG/1
CAPSULE, DELAYED RELEASE ORAL
Qty: 90 CAPSULE | Refills: 3 | Status: SHIPPED | OUTPATIENT
Start: 2022-03-02

## 2022-03-02 NOTE — TELEPHONE ENCOUNTER
Submitted PA for Lansoprazole 30MG dr capsules Via [facility]MM Key: [id number] STATUS: APPROVED effective 01/31/2022 through 03/02/2023    Please notify patient.  Thank you

## 2022-03-26 DIAGNOSIS — M48.061 SPINAL STENOSIS OF LUMBAR REGION WITHOUT NEUROGENIC CLAUDICATION: ICD-10-CM

## 2022-03-29 NOTE — PROGRESS NOTES
2020     Carmen Aviles (:  1965) is a 48 y.o. female, here for evaluation of the following medical concerns:    Epistaxis   The bleeding has been from the right (bleeding from right nostril) nare. The current episode started in the past 7 days. The problem occurs every several days. The problem has been unchanged. The bleeding is associated with nothing. She has tried pressure for the symptoms. The treatment provided moderate relief. Her past medical history is significant for allergies and sinus problems. There is no history of a bleeding disorder. Headache   This is a new (bilateral temporal artery pain with normal sed rate) problem. The problem occurs intermittently. The problem has been waxing and waning. The pain is located in the temporal and left unilateral region. The quality of the pain is described as aching. The pain is at a severity of 8/10. Associated symptoms include back pain and a loss of balance. Pertinent negatives include no abdominal pain, abnormal behavior, anorexia, blurred vision, coughing, ear pain, eye pain, eye redness, fever, hearing loss, insomnia, nausea, neck pain, numbness, photophobia, rhinorrhea, seizures, sinus pressure, sore throat, swollen glands, vomiting or weakness. The symptoms are aggravated by alcohol. She has tried nothing for the symptoms. The treatment provided no relief. There is no history of obesity or pseudotumor cerebri. Patient had H. Pylori rx and feels better with   Recurrent thrush:    Lab Results   Component Value Date    LABA1C 5.6 10/14/2020    LABA1C 5.4 2019    LABA1C 5.3 2018     Lab Results   Component Value Date    LABMICR Not Indicated 10/14/2020    LDLCALC 122 (H) 10/14/2020    CREATININE <0.5 (L) 10/14/2020     Instructed to start align probiotic. It clears up with medication. Negative HIV. Normal dental exam. Does not take steroids or steroid inhalers.       Patient Active Problem List   Diagnosis  Other fatigue    Allergic rhinitis    Vitamin D deficiency    Insomnia    Hyperlipidemia    Neck pain    Constipation    Lumbar stenosis    Candida esophagitis (HCC)    Weight gain    Lumbar radiculopathy    Spondylolisthesis of lumbar region    HNP (herniated nucleus pulposus), lumbar    Muscle spasm    Acute non-recurrent maxillary sinusitis    Skin hypopigmentation    Eczema    Melanonychia striata    Neural foraminal stenosis of lumbar spine    Sacroiliitis (HCC)    Symptomatic menopausal or female climacteric states    Radiculopathy, lumbosacral region    Spinal stenosis of lumbar region    Acquired spondylolisthesis    Acute serous otitis media    Mild intermittent asthma without complication    Gastroesophageal reflux disease with esophagitis    Family history of type 2 diabetes mellitus in mother   [facility] Thrush     Allergies   Allergen Reactions    Fruit Acid Concentrate      Tomato, strawberries    Penicillins Hives    Pollen Extract     Tomato Anaphylaxis     Vomiting  Vomiting    Bee Pollen     Fruit & Vegetable Daily  [Cholestatin]     Fruit Extracts     Influenza Vac Split Quad     Meloxicam      Rapid weight gain    Versed [Midazolam] Other (See Comments)     Extremely sleepy with a dose of 2 mg. Reversal used To wake patient up. She states this happened at her colonoscopy the week before also.  Influenza Vaccines Rash     Hives with vaccine. Review of Systems   Constitutional: Negative for chills, diaphoresis, fatigue and fever. HENT: Positive for congestion, nosebleeds and postnasal drip. Negative for ear pain, hearing loss, rhinorrhea, sinus pressure and sore throat. Eyes: Negative for blurred vision, photophobia, pain and redness. Respiratory: Negative for cough and shortness of breath. Cardiovascular: Negative. Negative for chest pain. Gastrointestinal: Negative for abdominal distention, abdominal pain, anal bleeding, anorexia, blood in stool, constipation, nausea, rectal pain and vomiting. Endocrine: Negative. Genitourinary: Negative. Negative for dysuria and pelvic pain. Musculoskeletal: Positive for back pain. Negative for joint swelling, myalgias and neck pain. Low back pain improved with Lyrica 25 mg twice a day and fibromyalgia controlled with Topamax 25 mg daily. Skin: Negative. Finger nail thickening. Allergic/Immunologic: Negative. Neurological: Positive for headaches and loss of balance. Negative for seizures, weakness and numbness. Lumbar laminectomy November of 2017 at [facility] with resolution of spinal claudication. Improved with epidural   Hematological: Negative. Psychiatric/Behavioral: Negative. The patient does not have insomnia. Prior to Visit Medications    Medication Sig Taking? Authorizing Provider   SM CLEARLAX 17 GM/SCOOP powder TAKE 17 G BY MOUTH ONCE DAILY Yes Corine Smith MD   Rimegepant Sulfate (NURTEC) 75 MG TBDP Take 1 tablet by mouth daily Yes Corine Smith MD   Est Estrogens-Methyltest DS 1.25-2.5 MG TABS Take 1 tablet by mouth once daily Yes Corine Smith MD   pregabalin (LYRICA) 25 MG capsule Take 1 capsule by mouth 2 times daily.  Yes Corine Smith MD   sucralfate (CARAFATE) 1 GM tablet Take 1 tablet by mouth 4 times daily Yes Corine Smith MD   lansoprazole (PREVACID) 30 MG delayed release capsule Take 1 capsule by mouth once daily Yes Corine Smith MD   fexofenadine (ALLEGRA) 180 MG tablet Take 1 tablet by mouth daily Stop zyrtec Yes Corine Smith MD   Baclofen 5 MG TABS Take 1 tablet by mouth 3 times daily as needed (back pain) Yes Corine Smith MD   calcium carbonate-vitamin D (CALCIUM 600+D) 600-200 MG-UNIT TABS Take 1 each by mouth 2 times daily Yes Corine Smith MD Detail Level: Detailed Pupils: Pupils are equal, round, and reactive to light. Neck:      Musculoskeletal: Normal range of motion and neck supple. Thyroid: No thyromegaly. Vascular: No JVD. Trachea: No tracheal deviation. Cardiovascular:      Rate and Rhythm: Normal rate. Heart sounds: Normal heart sounds. No murmur. No gallop. Pulmonary:      Effort: Pulmonary effort is normal. No respiratory distress. Breath sounds: Normal breath sounds. No wheezing or rales. Chest:      Chest wall: No tenderness. Abdominal:      General: Bowel sounds are normal. There is no distension. Palpations: Abdomen is soft. There is no mass. Tenderness: There is no abdominal tenderness. There is no guarding or rebound. Musculoskeletal:         General: No tenderness or deformity. Lymphadenopathy:      Cervical: No cervical adenopathy. Skin:     General: Skin is warm and dry. Coloration: Skin is not pale. Findings: No erythema or rash. Comments: Find papillary ration hyperpigmentation posterior neck consistent with atopic dermatitis   Neurological:      Mental Status: She is alert and oriented to person, place, and time. Cranial Nerves: No cranial nerve deficit. Motor: No abnormal muscle tone. Coordination: Coordination normal.      Deep Tendon Reflexes: Reflexes normal.      Comments: Decreased knee jerks , kurtis biceps and triceps reflexes. Reduced spasm in lower back. Reduced range of motion of lumbar spine. Psychiatric:         Behavior: Behavior normal.         Thought Content: Thought content normal.         Judgment: Judgment normal.         ASSESSMENT/PLAN:   Diagnosis Orders   1. Chronic idiopathic constipation  polyethylene glycol (SM CLEARLAX) 17 GM/SCOOP powder   2.  Spinal stenosis of lumbar region without neurogenic claudication pain is controlled with PRN baclofen  Baclofen (LIORESAL) 5 MG tablet 3. Hives take epi pen with you when you go for the covid vaccine., Needs a refill for EpiPen  EPINEPHrine (EPIPEN 2-DAMIAN) 0.3 MG/0.3ML SOAJ injection    LUIS FELIPE Adan MD, Allergy & Immunology, [facility]    CBC Auto Differential    Renal Function Panel   4. Thrush, recurrent patient is to start probiotics, negative HIDA and no diabetes no recent antibiotics. clotrimazole (MYCELEX) 10 MG casimiro    LUIS FELIPE Adan MD, Allergy & Immunology, [facility]   5. Temporal headache  SEDIMENTATION RATE    Butalbital-Acetaminophen  MG TABS   6. Intractable cluster headache syndrome, unspecified chronicity pattern  Rimegepant Sulfate (NURTEC) 75 MG TBDP   7. Right-sided epistaxis  Aliya Wilkerson MD, Otolaryngology, [facility]       An electronic signature was used to authenticate this note.     --Zain Roberts MD on 12/31/2020 at 10:44 AM Add 88703 Cpt? (Important Note: In 2017 The Use Of 45983 Is Being Tracked By Cms To Determine Future Global Period Reimbursement For Global Periods): yes

## 2022-03-30 RX ORDER — BACLOFEN 5 MG/1
TABLET ORAL
Qty: 90 TABLET | Refills: 0 | OUTPATIENT
Start: 2022-03-30

## 2022-03-30 NOTE — TELEPHONE ENCOUNTER
Inform patient that we gave her a 3-month prescription for 90 tablets in mid January, would expect her to run out mid April. If she using the baclofen more frequently or if she just requesting early to make sure that she does not run out? Also as she reached out to her orthopedics team regarding her worsening pain.   We have not seen her since last August.

## 2022-04-01 DIAGNOSIS — M48.061 SPINAL STENOSIS OF LUMBAR REGION WITHOUT NEUROGENIC CLAUDICATION: ICD-10-CM

## 2022-04-01 DIAGNOSIS — R68.82 LOW LIBIDO: ICD-10-CM

## 2022-04-06 RX ORDER — ESTERIFIED ESTROGENS AND METHYLTESTOSTERONE 1.25; 2.5 MG/1; MG/1
TABLET, FILM COATED ORAL
Qty: 90 TABLET | Refills: 0 | Status: SHIPPED | OUTPATIENT
Start: 2022-04-06 | End: 2022-09-26

## 2022-04-06 RX ORDER — BACLOFEN 5 MG/1
TABLET ORAL
Qty: 90 TABLET | Refills: 0 | Status: SHIPPED | OUTPATIENT
Start: 2022-04-06 | End: 2022-06-27

## 2022-04-07 ENCOUNTER — OFFICE VISIT (OUTPATIENT)
Dept: PRIMARY CARE CLINIC | Age: 57
End: 2022-04-07
Payer: COMMERCIAL

## 2022-04-07 VITALS
HEART RATE: 66 BPM | SYSTOLIC BLOOD PRESSURE: 111 MMHG | TEMPERATURE: 98.2 F | WEIGHT: 170 LBS | BODY MASS INDEX: 27.44 KG/M2 | DIASTOLIC BLOOD PRESSURE: 73 MMHG | OXYGEN SATURATION: 96 %

## 2022-04-07 DIAGNOSIS — J30.1 SEASONAL ALLERGIC RHINITIS DUE TO POLLEN: ICD-10-CM

## 2022-04-07 DIAGNOSIS — Z00.00 ROUTINE ADULT HEALTH MAINTENANCE: ICD-10-CM

## 2022-04-07 DIAGNOSIS — R20.2 ARM PARESTHESIA, LEFT: ICD-10-CM

## 2022-04-07 DIAGNOSIS — Z12.31 BREAST CANCER SCREENING BY MAMMOGRAM: Primary | ICD-10-CM

## 2022-04-07 DIAGNOSIS — R63.5 WEIGHT GAIN: ICD-10-CM

## 2022-04-07 DIAGNOSIS — R20.2 PARESTHESIA OF RIGHT FOOT: ICD-10-CM

## 2022-04-07 DIAGNOSIS — M54.16 LUMBAR RADICULOPATHY: ICD-10-CM

## 2022-04-07 PROCEDURE — 99213 OFFICE O/P EST LOW 20 MIN: CPT | Performed by: INTERNAL MEDICINE

## 2022-04-07 RX ORDER — PREDNISONE 20 MG/1
40 TABLET ORAL DAILY
Qty: 10 TABLET | Refills: 0 | Status: SHIPPED | OUTPATIENT
Start: 2022-04-07 | End: 2022-04-12

## 2022-04-07 RX ORDER — PREGABALIN 25 MG/1
CAPSULE ORAL
Qty: 60 CAPSULE | Refills: 5 | Status: SHIPPED | OUTPATIENT
Start: 2022-04-07 | End: 2022-09-26

## 2022-04-07 ASSESSMENT — PATIENT HEALTH QUESTIONNAIRE - PHQ9
1. LITTLE INTEREST OR PLEASURE IN DOING THINGS: 0
SUM OF ALL RESPONSES TO PHQ QUESTIONS 1-9: 0
SUM OF ALL RESPONSES TO PHQ9 QUESTIONS 1 & 2: 0
SUM OF ALL RESPONSES TO PHQ QUESTIONS 1-9: 0
2. FEELING DOWN, DEPRESSED OR HOPELESS: 0

## 2022-04-07 NOTE — PROGRESS NOTES
2022    Carmel Rodrigues (:  1965) is a 55 y.o. 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 pylori, pseudoclaudication improved but not resolved with lumbar laminectomy in  on baclofen turmeric Lyrica and Topamax active prescriptions for all, chronic constipation on Linzess 145 mg, who comes in for several concerns, detailed in the assessment and plan below. Has not seen Dr. Moshe Chauhan since 2020. (At last visit 2021 concern for right buttock pain, labs imaging not completed, and sinusitis.)    Review of Systems   Constitutional: Negative for activity change, appetite change, fatigue and unexpected weight change. HENT: Negative for dental problem, sinus pain, sore throat and trouble swallowing. Eyes: Negative for pain and visual disturbance. Respiratory: Negative for apnea, cough, chest tightness, shortness of breath and wheezing. Cardiovascular: Negative for chest pain and palpitations. Gastrointestinal: Negative for abdominal pain, blood in stool, constipation, diarrhea, nausea, rectal pain and vomiting. Endocrine: Negative for cold intolerance, heat intolerance, polydipsia, polyphagia and polyuria. Genitourinary: Negative for difficulty urinating, dysuria, flank pain, frequency, hematuria, pelvic pain, urgency, vaginal bleeding and vaginal discharge. Musculoskeletal: Negative for arthralgias, back pain, gait problem, joint swelling, myalgias, neck pain and neck stiffness. Skin: Negative for color change and rash. Neurological: Negative for dizziness, tremors, syncope, speech difficulty, weakness, light-headedness and headaches. Hematological: Negative for adenopathy. Does not bruise/bleed easily. Psychiatric/Behavioral: Negative for agitation, behavioral problems, decreased concentration, sleep disturbance and suicidal ideas. The patient is not nervous/anxious and is not hyperactive. Prior to Visit Medications    Medication Sig Taking?  Authorizing Provider   pregabalin (LYRICA) 25 MG capsule Take 1 capsule by mouth twice daily Yes Aubrie Arellano MD   predniSONE (DELTASONE) 20 MG tablet Take 2 tablets by mouth daily for 5 days Yes Aubrie Arellano MD   Baclofen (LIORESAL) 5 MG tablet TAKE 1 TABLET BY MOUTH THREE TIMES DAILY AS NEEDED FOR BACK PAIN Yes Aubrie Arellano MD   Est Estrogens-Methyltest DS 1.25-2.5 MG TABS Take 1 tablet by mouth once daily Yes Aubrie Arellano MD   lansoprazole (PREVACID) 30 MG delayed release capsule Take 1 capsule by mouth once daily Yes Aubrie Arellano MD   Cholecalciferol (VITAMIN D) 50 MCG (2000 UT) CAPS capsule Take 1 capsule by mouth once daily Yes Aubrie Arellano MD   topiramate (TOPAMAX) 25 MG tablet Take 1 tablet by mouth nightly Yes Aubrie Arellano MD   dicyclomine (BENTYL) 10 MG capsule Take 1 capsule by mouth 4 times daily As needed for cramps Yes Aubrie Arellano MD   fexofenadine (ALLEGRA) 180 MG tablet Take 1 tablet by mouth daily Stop zyrtec Yes Aubrie Arellano MD   polyethylene glycol (SM CLEARLAX) 17 GM/SCOOP powder Take 34 g by mouth daily Yes Aubrie Arellano MD   calcium carbonate-vitamin D (CALCIUM 600+D) 600-200 MG-UNIT TABS Take 1 each by mouth 2 times daily Yes Amber Bear MD   albuterol sulfate HFA (VENTOLIN HFA) 108 (90 Base) MCG/ACT inhaler Inhale 2 puffs into the lungs every 6 hours as needed for Wheezing Yes Amber Bear MD   ondansetron (ZOFRAN) 4 MG tablet Take 1-2 tablets by mouth every 12 hours as needed for Nausea Yes HANS Berg CNP   triamcinolone (KENALOG) 0.05 % OINT ointment Apply topically 2 times daily To back of the neck Yes Amber Bear MD   tacrolimus (PROTOPIC) 0.1 % ointment Apply to affected area BID Yes Rosa Knapp MD   azelastine (ASTELIN) 0.1 % nasal spray 1 spray by Nasal route 2 times daily Use in each nostril as directed Yes Katherine Turner MD   EPINEPHrine (EPIPEN 2-DAMIAN) 0.3 MG/0.3ML SOAJ injection Inject 0.3 mLs into the muscle once for 1 dose Use as directed for allergic reaction  Katherine Turner MD        Allergies   Allergen Reactions    Fruit Acid Concentrate      Tomato, strawberries    Penicillins Hives    Pollen Extract     Tomato Anaphylaxis     Vomiting  Vomiting    Bee Pollen     Fruit & Vegetable Daily  [Cholestatin]     Fruit Extracts     Influenza Vac Split Quad     Meloxicam      Rapid weight gain    Sumatriptan      Sick weak.  Versed [Midazolam] Other (See Comments)     Extremely sleepy with a dose of 2 mg. Reversal used To wake patient up. She states this happened at her colonoscopy the week before also.  Influenza Vaccines Rash     Hives with vaccine.        Past Medical History:   Diagnosis Date    Allergic rhinitis     Chronic back pain     l4-L5 lumbar spinal stenosis    Constipation     Diverticulosis     Hyperlipidemia     Intractable cluster headache syndrome     Mild intermittent asthma without complication 1/8/5196       Past Surgical History:   Procedure Laterality Date    COLONOSCOPY N/A 9/13/2019    COLONOSCOPY biopsy sigmoid colon polyp performed by Jackolyn Romberg, MD at [address]    endometriosis    HYSTERECTOMY, TOTAL ABDOMINAL      one ovary remains    LUMBAR FUSION Left 11/10/2017    LEFT L4-L5, L5-S1 TRANSFORAMINAL LUMBAR INTERBODY FUSION WITH with CT navigation    LUMBAR NERVE BLOCK N/A 9/18/2019    L3L4 INTERLAMINAR EPIDURAL STEROID INJECTION WITH FLUOROSCOPY performed by Carlos Bhatia MD at [address] [place] [address] Right 8/5/2019    RIGHT L3 AND L4 TRANSFORAMINAL EPIDURAL STEROID INJECTION WITH FLUOROSCOPY performed by Carlos Bhatia MD at [address] [place] [address] Status: Not on file   Intimate Partner Violence:     Fear of Current or Ex-Partner: Not on file    Emotionally Abused: Not on file    Physically Abused: Not on file    Sexually Abused: Not on file   Housing Stability:     Unable to Pay for Housing in the Last Year: Not on file    Number of [place] in the Last Year: Not on file    Unstable Housing in the Last Year: Not on file        Family History   Problem Relation Age of Onset    Heart Disease Mother     High Blood Pressure Mother     Diabetes Mother     Heart Disease Father     Stroke Father     High Blood Pressure Father     Heart Disease Paternal Grandmother        Vitals:    04/07/22 1612   BP: 111/73   Pulse: 66   Temp: 98.2 °F (36.8 °C)   TempSrc: Temporal   SpO2: 96%   Weight: 170 lb (77.1 kg)     Estimated body mass index is 27.44 kg/m² as calculated from the following:    Height as of 8/18/21: 5' 6\" (1.676 m). Weight as of this encounter: 170 lb (77.1 kg). PHYSICAL EXAM  GENERAL:  Pleasant  female who looks her stated age, awake alert and oriented x3, no acute distress. HEENT: No asymmetric frontal or maxillary sinus tenderness. Clear rhinorrhea in the nares, erythematous turbinates, oropharynx clear. NECK: Supple nontender. No neck masses. LYMPH: No cervical or supraclavicular lymphadenopathy. EXTREMITIES: Warm and well perfused. 2+ pulses except diminished posterior tibialis pulses bilaterally. brisk capillary refill. No clubbing cyanosis or edema. Adson's test weakly positive on the left. MUSCULOSKELETAL:  NEURO: Negative Tinel's sign at the carpal tunnel and cubital tunnel. Spurling test negative for cervical radiculopathy.  strength finger abduction finger flexion symmetrically strong. On the right lower extremity distal monofilament sensation reduction and lateral calf monofilament sensation reduction. Fairly preserved vibration and temperature dorsal feet.   Strength preserved in the lower extremity. PSYCH:  No psychomotor retardation or agitation. Good eye contact. Unrestricted affect range. Mood congruent with affect. Linear thought.     LABS  Lab Review   Orders Only on 07/09/2021   Component Date Value    ALLERGEN SEE NOTE 07/09/2021 See Note    Orders Only on 07/09/2021   Component Date Value    Candida Albicans 07/09/2021 <0.10    Orders Only on 07/09/2021   Component Date Value    Diphtheria IgG Ab 07/09/2021 0.4     Tetanus IgG Ab 07/09/2021 4.1    Orders Only on 07/09/2021   Component Date Value    Complement Activity, Tot* 07/09/2021 27.20*   Orders Only on 07/09/2021   Component Date Value    IgG 1 07/09/2021 414     IgG 2 07/09/2021 179     IgG 3 07/09/2021 8*    IgG 4 07/09/2021 3    Orders Only on 07/09/2021   Component Date Value    IgM 07/09/2021 37.0*   Orders Only on 07/09/2021   Component Date Value    IgA 07/09/2021 77.0    Orders Only on 07/09/2021   Component Date Value    WBC 07/09/2021 2.8*    RBC 07/09/2021 4.49     Hemoglobin 07/09/2021 14.4     Hematocrit 07/09/2021 42.1     MCV 07/09/2021 93.7     MCH 07/09/2021 32.0     MCHC 07/09/2021 34.2     RDW 07/09/2021 13.4     Platelets 85/49/4528 200     MPV 07/09/2021 8.8     Neutrophils % 07/09/2021 48.9     Lymphocytes % 07/09/2021 41.3     Monocytes % 07/09/2021 5.9     Eosinophils % 07/09/2021 3.3     Basophils % 07/09/2021 0.6     Neutrophils Absolute 07/09/2021 1.4*    Lymphocytes Absolute 07/09/2021 1.2     Monocytes Absolute 07/09/2021 0.2     Eosinophils Absolute 07/09/2021 0.1     Basophils Absolute 07/09/2021 0.0    Orders Only on 07/09/2021   Component Date Value    Amylase 07/09/2021 64     Lactic Acid 07/09/2021 0.9     Lipase 07/09/2021 19.0     Sodium 07/09/2021 140     Potassium 07/09/2021 4.4     Chloride 07/09/2021 106     CO2 07/09/2021 25     Anion Gap 07/09/2021 9     Glucose 07/09/2021 85     BUN 07/09/2021 11     CREATININE 07/09/2021 0.5*    GFR Non- American 07/09/2021 >60     GFR  07/09/2021 >60     Calcium 07/09/2021 9.0     Total Protein 07/09/2021 6.5     Albumin 07/09/2021 4.6     Albumin/Globulin Ratio 07/09/2021 2.4*    Total Bilirubin 07/09/2021 1.1*    Alkaline Phosphatase 07/09/2021 43     ALT 07/09/2021 29     AST 07/09/2021 23     Globulin 07/09/2021 1.9    Orders Only on 02/11/2021   Component Date Value    Sodium 02/11/2021 140     Potassium 02/11/2021 4.3     Chloride 02/11/2021 105     CO2 02/11/2021 26     Anion Gap 02/11/2021 9     Glucose 02/11/2021 89     BUN 02/11/2021 15     CREATININE 02/11/2021 0.5*    GFR Non- 02/11/2021 >60     GFR  02/11/2021 >60     Calcium 02/11/2021 8.8     Phosphorus 02/11/2021 2.8     Albumin 02/11/2021 4.5     WBC 02/11/2021 3.8*    RBC 02/11/2021 4.39     Hemoglobin 02/11/2021 13.5     Hematocrit 02/11/2021 41.0     MCV 02/11/2021 93.5     MCH 02/11/2021 30.7     MCHC 02/11/2021 32.9     RDW 02/11/2021 13.1     Platelets 95/04/1391 190     MPV 02/11/2021 9.3     Neutrophils % 02/11/2021 50.5     Lymphocytes % 02/11/2021 38.6     Monocytes % 02/11/2021 8.1     Eosinophils % 02/11/2021 2.1     Basophils % 02/11/2021 0.7     Neutrophils Absolute 02/11/2021 1.9     Lymphocytes Absolute 02/11/2021 1.5     Monocytes Absolute 02/11/2021 0.3     Eosinophils Absolute 02/11/2021 0.1     Basophils Absolute 02/11/2021 0.0     H Pylori Breath Test 02/11/2021 Positive*   There may be more visits with results that are not included. ASSESSMENT/PLAN  1. Seasonal allergic rhinitis flare  No fever no purulent rhinorrhea on exam posterior oropharynx without injection or exudate though postnasal drip symptoms. Failing Zyrtec Flonase saline spray. No asymmetric sinus tenderness or purulence. Short course prednisone 40 mg daily for 5 days.     Patient expressed interest in antibiotics, though findings do not suggest bacterial sinusitis at this time, though stasis due to allergic inflammation can increase risk. 2. Radiculopathy, lumbosacral region. Postlaminectomy syndrome. Stated history of native hip septic arthritis. Patient historically struggles with chronic low back pain burning, with radiation down the right leg. Over the past few months pattern has evolved where she is not experiencing pain so much as numbness without loss of strength. Status post left L4-S1 fusion 2017 spinal cord stimulator without benefit 2020, the patient indicates that later removal improved the pain. At last visit in August 2021, restarted topiramate 25 mg at bedtime in conjunction with Lyrica 25 mg p.o. nightly baclofen turmeric. 3. Healthcare maintenance  Colonoscopy 2021 evidently normal.  TSH 1.7 A1c 5.6 vitamin D 43; lipid panel  HDL 77 at that time previously screened negative for hepatitis C and HIV. We will update health surveillance screening labs at this time. Ordered mammogram.    Due for Tdap, COVID eligible. 4.  Bitemporal headache  Left mandibular molar shows signs of wear, wonder about malocclusion. Small TMJ joint click, and cannot get a bruxism history. Patient chose not to pursue MRI previously ordered. 5.  GERD. History of H. pylori. EGD August 2020 raise question of NUD though patient not on TCA but PPI Carafate. H. pylori detected on biopsy, subsequently treated. Able to pass 46 [place] dilator without mucosal tearing. Results of multiple biopsies not available in our system, 2 small patches proximal esophagus, 2 mm columnar epithelium island just above the Z-line biopsied. Should re test to document successful eradication, perhaps asymptomatic status is reassuring though patient chose not to complete the test.  Pursue when desires. 6. Constipation. Presumptive constipation predominant IBS.    Colonoscopy 9/3/19 to the cecum but not TI found extremely long, redundant colon with excision small rectal polyp. Bacterial overgrowth possible, visceral hypersensitivity, NUD, biliary disease in ddx. Should patient wish to pursue, would need AXR assess stool burden and trial of lactulose vs Zelnorm vs Motegrity. Address H pylori, above. Trial nortripthyline if other interventions ineffective or unaffordable. 7.  Positional left arm numbness. Only recently, when patient sleeping on right side notices that her left arm goes numb but not weak or cold or pale. Negative Spurling test.  Weak thoracic outlet findings on exam, but more of a paresthesia rather than ischemia presentation, though mild ischemia can present like this. May represent something of a brachial plexopathy stretch. We will start with EMG, may need at least chest x-ray, and plan refer for thoracic outlet physical therapy if EMG unrevealing or suggestive of brachial plexus. Impingement. Suspect Rx azithromycin sent given duration of symptoms. 8.  Weight gain. Vasomotor instability history. Status post hysterectomy 1999.      2014 patient weighed 205 pounds, with intentional weight loss, hard work, diet she got down to 140 pounds x 2017, and since slowly regained 30 pounds over the last 5 years. There was a brief plateau when she was on phentermine in 2019, where her weight stayed between 155 and 160, then resumed steady weight gain. She questions whether her higher dose HRT is an appropriate dose, and might be contributing to weight gain. Literature suggests that although some fluid retention from the estrogen component is possible, that this effect is quite variable. But one published systematic review of 22 peer-reviewed articles suggest that most women do not experience significant weight change with estrogen, but that is not to say that this effect is universal.  She ran out of estrogen a few days ago. We should screen for thyroid disease, diabetes.     She wants to go back on phentermine 37.5 mg daily despite the failure to achieve weight loss when she was on it in 2019. Return in about 4 weeks (around 5/5/2022) for 30 minutes review test results, neuropathy. It was a pleasure to visit with Ms. Kong today. Answered all questions as best I could.   Tj Navas MD   Time 39 minutes

## 2022-04-18 DIAGNOSIS — R20.2 PARESTHESIA OF RIGHT FOOT: ICD-10-CM

## 2022-04-18 DIAGNOSIS — R20.2 ARM PARESTHESIA, LEFT: ICD-10-CM

## 2022-04-18 DIAGNOSIS — R63.5 WEIGHT GAIN: ICD-10-CM

## 2022-04-18 DIAGNOSIS — Z00.00 ROUTINE ADULT HEALTH MAINTENANCE: ICD-10-CM

## 2022-04-18 DIAGNOSIS — M54.16 LUMBAR RADICULOPATHY: ICD-10-CM

## 2022-04-18 LAB
ANION GAP SERPL CALCULATED.3IONS-SCNC: 10 MMOL/L (ref 3–16)
BUN BLDV-MCNC: 18 MG/DL (ref 7–20)
C-REACTIVE PROTEIN: <3 MG/L (ref 0–5.1)
CALCIUM SERPL-MCNC: 9.1 MG/DL (ref 8.3–10.6)
CHLORIDE BLD-SCNC: 106 MMOL/L (ref 99–110)
CHOLESTEROL, TOTAL: 178 MG/DL (ref 0–199)
CO2: 26 MMOL/L (ref 21–32)
CREAT SERPL-MCNC: 0.5 MG/DL (ref 0.6–1.1)
FOLLICLE STIMULATING HORMONE: 34.9 MIU/ML
GFR AFRICAN AMERICAN: >60
GFR NON-AFRICAN AMERICAN: >60
GLUCOSE BLD-MCNC: 96 MG/DL (ref 70–99)
HDLC SERPL-MCNC: 83 MG/DL (ref 40–60)
LDL CHOLESTEROL CALCULATED: 85 MG/DL
LUTEINIZING HORMONE: 34.3 MIU/ML
POTASSIUM SERPL-SCNC: 4.1 MMOL/L (ref 3.5–5.1)
SODIUM BLD-SCNC: 142 MMOL/L (ref 136–145)
TRIGL SERPL-MCNC: 50 MG/DL (ref 0–150)
TSH REFLEX: 1.28 UIU/ML (ref 0.27–4.2)
VITAMIN D 25-HYDROXY: 39.5 NG/ML
VLDLC SERPL CALC-MCNC: 10 MG/DL

## 2022-04-19 ENCOUNTER — TELEPHONE (OUTPATIENT)
Dept: INTERNAL MEDICINE CLINIC | Age: 57
End: 2022-04-19

## 2022-04-19 LAB
ESTIMATED AVERAGE GLUCOSE: 105.4 MG/DL
HBA1C MFR BLD: 5.3 %

## 2022-04-19 NOTE — TELEPHONE ENCOUNTER
Spoke w/patient regarding a NP appt w/Dr Yash Morris. She will consider scheduling w/Nurse Vazquez b/c she wants to see a female provider. Patient wants to be able to switch to Dr Yash Morris if her schedule ever opens up for new patients. Internal transfer policy discussed w/patient. Patient will call back if she decides to schedule w/Nurse Vazquez.

## 2022-04-19 NOTE — TELEPHONE ENCOUNTER
----- Message from [employer] sent at 2022 10:51 AM EDT -----  Subject: Appointment Request    Reason for Call: New Patient Request Appointment    QUESTIONS  Type of Appointment? New Patient/New to Provider  Reason for appointment request? Requested Provider unavailable - Kalia Camara  Additional Information for Provider? Patient was with Dr Booker Brush and was   given Dr. Mariam Echavarria and she does not want a male doctor. She would like to   be put on a waiting list to see Dr Cathy Wood , if all possible or be   referred to another female Dr.  ---------------------------------------------------------------------------  --------------  Adynxx  What is the best way for the office to contact you? OK to leave message on   voicemail  Preferred Call Back Phone Number? [phone]  ---------------------------------------------------------------------------  --------------  SCRIPT ANSWERS  Relationship to Patient? Self  Specialty Confirmation? Primary Care  Is this the first appointment to establish care for a ? No  Have you been diagnosed with, awaiting test results for, or told that you   are suspected of having COVID-19 (Coronavirus)? (If patient has tested   negative or was tested as a requirement for work, school, or travel and   not based on symptoms, answer no)? No  Within the past 10 days have you developed any of the following symptoms   (answer no if symptoms have been present longer than 10 days or began   more than 10 days ago)? Fever or Chills, Cough, Shortness of breath or   difficulty breathing, Loss of taste or smell, Sore throat, Nasal   congestion, Sneezing or runny nose, Fatigue or generalized body aches   (answer no if pain is specific to a body part e.g. back pain), Diarrhea,   Headache? No  Have you had close contact with someone with COVID-19 in the last 7 days? No  (Service Expert  click yes below to proceed with [employer] As Usual   Scheduling)?  Yes

## 2022-04-20 LAB
ALBUMIN SERPL-MCNC: 3.9 G/DL (ref 3.1–4.9)
ALPHA-1-GLOBULIN: 0.2 G/DL (ref 0.2–0.4)
ALPHA-2-GLOBULIN: 0.6 G/DL (ref 0.4–1.1)
BETA GLOBULIN: 1 G/DL (ref 0.9–1.6)
GAMMA GLOBULIN: 0.8 G/DL (ref 0.6–1.8)
SPE/IFE INTERPRETATION: NORMAL
TOTAL PROTEIN: 6.4 G/DL (ref 6.4–8.2)

## 2022-05-12 ENCOUNTER — HOSPITAL ENCOUNTER (OUTPATIENT)
Dept: NEUROLOGY | Age: 57
Discharge: HOME | End: 2022-05-12
Payer: COMMERCIAL

## 2022-05-12 DIAGNOSIS — R20.2 ARM PARESTHESIA, LEFT: ICD-10-CM

## 2022-05-12 DIAGNOSIS — N95.1 PERI-MENOPAUSAL: Primary | ICD-10-CM

## 2022-05-12 DIAGNOSIS — R20.2 PARESTHESIA OF RIGHT FOOT: ICD-10-CM

## 2022-05-12 DIAGNOSIS — M54.16 LUMBAR RADICULOPATHY: ICD-10-CM

## 2022-05-12 PROCEDURE — 95909 NRV CNDJ TST 5-6 STUDIES: CPT

## 2022-05-12 PROCEDURE — 95886 MUSC TEST DONE W/N TEST COMP: CPT

## 2022-05-12 RX ORDER — NALTREXONE HYDROCHLORIDE 50 MG/1
TABLET, FILM COATED ORAL
Qty: 18 TABLET | Refills: 1 | Status: SHIPPED | OUTPATIENT
Start: 2022-05-12 | End: 2022-07-08 | Stop reason: SDUPTHER

## 2022-05-12 RX ORDER — BUPROPION HYDROCHLORIDE 100 MG/1
TABLET, EXTENDED RELEASE ORAL
Qty: 70 TABLET | Refills: 0 | Status: SHIPPED | OUTPATIENT
Start: 2022-05-12 | End: 2022-07-07 | Stop reason: SDUPTHER

## 2022-05-12 NOTE — PROCEDURES
Test Date:  2022    Patient: Paty Wade  : 1965 Physician: Danial Barlow DO   Sex: Female ID#:  Ref Phys: Shakila Worley MD     Patient Complaints:  Patient is a 55year-old female who presents with numbness in the left upper extremity onset 3 months ago. Also numbness right lower limb. Onset after surgery in 2017 Patient with back pain. Patient History / Exam:  PMH + L 4,5 S1 fusion, no neck or left arm surgery. no endocrine disease. PE:  reflexes trace in upper limbs, absent lower limbs. + thumb opposition weakness left hand. NCV & EMG Findings:  Evaluation of the left median (APB) motor nerve showed prolonged distal onset latency (5.2 ms) and reduced amplitude (4.9 mV). The left median sensory nerve showed prolonged distal peak latency (5.2 ms), reduced amplitude (9 µV), and decreased conduction velocity (27 m/s). All remaining nerves (as indicated in the following tables) were within normal limits. All examined muscles (as indicated in the following table) showed no evidence of electrical instability. Impression:  Study is consistent with left carpal tunnel syndrome, moderate severity. no evidence of an acute radiculopathy or other entrapment neuropathy.           Danial Barlow DO        Nerve Conduction Studies  Motor Nerve Results      Latency Amplitude F-Lat Segment Distance CV Comment   Site (ms) Norm (mV) Norm (ms)  (cm) (m/s) Norm    Right Fibular (EDB) Motor   Ankle 4.1  < 6.1 5.9  > 2.0         Bel Fib Head 11.7 - 4.9 -  Bel Fib Head-Ankle 36 47  > 38    Left Median (APB) Motor   Wrist 5.2  < 4.2 4.9  > 5.0         Elbow 8.5 - 7.6 -  Elbow-Wrist 21 64  > 50    Left Ulnar (ADM) Motor   Wrist 3.5  < 4.2 3.6  > 3.0         Bel Elbow 7.1 - 5.7 -  Bel Elbow-Wrist 25 69  > 50    Abv Elbow 8.2 - 4.5 -  Abv Elbow-Bel Elbow 6 55  > 48      Sensory Nerve Results      Latency (Peak) Amplitude (P-P) Segment Distance CV Comment   Site (ms) Norm (µV) Norm  (cm) (m/s) Norm Left Median Sensory   Wrist-Dig II 5.2  < 3.6 9  > 10 Wrist-Dig II 14 27  > 39    Right Sural Sensory   Calf-Lat Mall 2.6  < 4.0 30  > 5 Calf-Lat Mall 14 54  > 35    Left Ulnar Sensory   Wrist-Dig V 3.4  < 3.7 54  > 15 Wrist-Dig V 14 41  > 38        Electromyography     Side Muscle Nerve Root Ins Act Fibs Psw Amp Dur Poly Recrt Int Lavonna Farmer Comment   Left Deltoid Axillary C5-C6 Nml Nml Nml Nml Nml 0 Nml Nml    Left Biceps Musculocut C5-C6 Nml Nml Nml Nml Nml 0 Nml Nml    Left Triceps Radial C6-C8 Nml Nml Nml Nml Nml 0 Nml Nml    Left Brachiorad Radial C5-C6 Nml Nml Nml Nml Nml 0 Nml Nml    Left Pronator Teres Median C6-C7 Nml Nml Nml Nml Nml 0 Nml Nml    Left EIP Post Interosseous,  R... C7-C8 Nml Nml Nml Nml Nml 0 Nml Nml    Left APB Median C8-T1 Nml Nml Nml Nml Nml 0 Nml Nml    Left FDI Ulnar C8-T1 Nml Nml Nml Nml Nml 0 Nml Nml    Left Cervical Paraspinal (Uppe. .. Rami C1-C3 Nml Nml Nml         Left Cervical Paraspinal (Mid) Rami C4-C6 Nml Nml Nml         Left Cervical Paraspinal (Johnathan Blade. .. Rami C7-C8 Nml Nml Nml         Right Gluteus Med Sup Gluteal L5-S1 Nml Nml Nml Nml Nml 0 Nml Nml    Right Vastus Med Femoral L2-L4 Nml Nml Nml Nml Nml 0 Nml Nml    Right Add Longus Obturator L2-L4 Nml Nml Nml Nml Nml 0 Nml Nml    Right Tib Anterior Deep Fibular,  Fibula. .. L4-L5 Nml Nml Nml Nml Nml 0 Nml Nml    Right Fib longus  L5-S1 Nml Nml Nml Nml Nml 0 Nml Nml    Right Gastroc MH Tibial S1-S2 Nml Nml Nml Nml Nml 0 Nml Nml    Right Ext Trinh Long Deep Fibular,  Fibula. .. L5-S1 Nml Nml Nml Nml Nml 0 Nml Nml    Right EDB Deep Fibular,  Fibula. .. L5-S1 Nml Nml Nml Nml Nml 0 Nml Nml    Right AHB Medial Plantar,  Tibi. ..  S1-S2 Nml Nml Nml Nml Nml 0 Nml Nml    Right Lumbo Paraspinal (Upper) Rami L1-L2 Nml Nml Nml         Right Lumbo Paraspinal (Mid) Rami L3-L4 Nml Nml Nml         Right Lumbo Paraspinal (Lower) Rami L5-S1 Nml Nml Nml             Electronically signed by Carlos Robles DO on 5/12/2022 at 9:38 AM

## 2022-06-16 DIAGNOSIS — G56.02 LEFT CARPAL TUNNEL SYNDROME: Primary | ICD-10-CM

## 2022-06-17 ENCOUNTER — TELEPHONE (OUTPATIENT)
Dept: PRIMARY CARE CLINIC | Age: 57
End: 2022-06-17

## 2022-06-23 ENCOUNTER — OFFICE VISIT (OUTPATIENT)
Dept: PRIMARY CARE CLINIC | Age: 57
End: 2022-06-23
Payer: COMMERCIAL

## 2022-06-23 VITALS
DIASTOLIC BLOOD PRESSURE: 80 MMHG | TEMPERATURE: 97.9 F | WEIGHT: 173 LBS | HEART RATE: 72 BPM | BODY MASS INDEX: 27.92 KG/M2 | SYSTOLIC BLOOD PRESSURE: 120 MMHG | OXYGEN SATURATION: 97 %

## 2022-06-23 DIAGNOSIS — Z91.018 TOMATO ALLERGY: ICD-10-CM

## 2022-06-23 DIAGNOSIS — R55 PRE-SYNCOPE: Primary | ICD-10-CM

## 2022-06-23 DIAGNOSIS — L50.9 HIVES: ICD-10-CM

## 2022-06-23 PROCEDURE — 99212 OFFICE O/P EST SF 10 MIN: CPT | Performed by: INTERNAL MEDICINE

## 2022-06-23 RX ORDER — EPINEPHRINE 0.3 MG/.3ML
0.3 INJECTION SUBCUTANEOUS ONCE
Qty: 3 EACH | Refills: 1 | Status: SHIPPED | OUTPATIENT
Start: 2022-06-23 | End: 2022-09-26

## 2022-06-23 ASSESSMENT — PATIENT HEALTH QUESTIONNAIRE - PHQ9
SUM OF ALL RESPONSES TO PHQ QUESTIONS 1-9: 0
SUM OF ALL RESPONSES TO PHQ QUESTIONS 1-9: 0
SUM OF ALL RESPONSES TO PHQ9 QUESTIONS 1 & 2: 0
2. FEELING DOWN, DEPRESSED OR HOPELESS: 0
1. LITTLE INTEREST OR PLEASURE IN DOING THINGS: 0
SUM OF ALL RESPONSES TO PHQ QUESTIONS 1-9: 0
SUM OF ALL RESPONSES TO PHQ QUESTIONS 1-9: 0

## 2022-06-23 NOTE — PROGRESS NOTES
2022    Soto Ruth (:  1965) is a 55 y.o. female, here for evaluation of the following medical concerns:    No chief complaint on file. 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 pylori, pseudoclaudication improved but not resolved with lumbar laminectomy in 2017 on baclofen turmeric Lyrica and Topamax active prescriptions for all, chronic constipation on Linzess 145 mg, who comes in for a presyncopal spell strongly reminiscent of prior allergen exposures while occasioning in [facility].  Patient states known food allergies to strawberries tomatoes and peanuts. Sometime ago, she saw an allergist, desensitization to peanuts did not apparently work. The patient felt well before her trip, had a good trip, but on the evening of arrival in [facility], after having a couple of bites of her dinner, began to experience lightheadedness and dizziness nausea progressing to shortness of breath graying out of vision for which the in-house physician evaluated her. She brings the record which shows blood pressure was down to 80/40 pulse 145, no EKG performed. She responded to IV fluids ketorolac and Zofran. She spent about 4 hours in his in-house clinic. She then went to her room and slept, and felt better the next day. She did not experience recurrence of symptoms for the remainder of the trip, and specifically no fever diarrhea swelling wheeze hives. Review of Systems   Constitutional: Negative for activity change, appetite change, fatigue and unexpected weight change. HENT: Negative for dental problem, sinus pain, sore throat and trouble swallowing. Eyes: Negative for pain and visual disturbance. Respiratory: Negative for apnea, cough, chest tightness, shortness of breath and wheezing. Cardiovascular: Negative for chest pain and palpitations.    Gastrointestinal: Negative for abdominal pain, blood in stool, constipation, diarrhea, nausea, rectal pain and vomiting. Endocrine: Negative for cold intolerance, heat intolerance, polydipsia, polyphagia and polyuria. Genitourinary: Negative for difficulty urinating, dysuria, flank pain, frequency, hematuria, pelvic pain, urgency, vaginal bleeding and vaginal discharge. Musculoskeletal: Negative for arthralgias, back pain, gait problem, joint swelling, myalgias, neck pain and neck stiffness. Skin: Negative for color change and rash. Neurological: Negative for dizziness, tremors, syncope, speech difficulty, weakness, light-headedness and headaches. Hematological: Negative for adenopathy. Does not bruise/bleed easily. Psychiatric/Behavioral: Negative for agitation, behavioral problems, decreased concentration, sleep disturbance and suicidal ideas. The patient is not nervous/anxious and is not hyperactive. Prior to Visit Medications    Medication Sig Taking? Authorizing Provider   estrogens, conjugated,-methylTESTOSTERone (EST ESTROGENS-METHYLTEST HS) 0.625-1.25 MG per tablet Take 1 tablet by mouth daily.   Hugo Strickland MD   naltrexone (DEPADE) 50 MG tablet Take 12.5 mg in the morning daily for a week increase to 12.5 mg twice daily for a week then 25 mg in the morning 12.5 mg in the evening for a week then 25 mg twice a day thereafter  Hugo Strickland MD   buPROPion [facility] SR) 100 MG extended release tablet Take 100 mg in the morning daily for a week increase to 100 mg twice daily for a week then 200 mg in the morning 100 mg in the evening for a week then 200 mg twice a day thereafter  Hugo Strickland MD   pregabalin (LYRICA) 25 MG capsule Take 1 capsule by mouth twice daily  Hugo Strickland MD   Baclofen (LIORESAL) 5 MG tablet TAKE 1 TABLET BY MOUTH THREE TIMES DAILY AS NEEDED FOR BACK PAIN  Hugo Strickland MD   Est Estrogens-Methyltest DS 1.25-2.5 MG TABS Take 1 tablet by mouth once daily  Hugo Strickland MD   lansoprazole (PREVACID) 30 MG delayed release capsule Take 1 capsule by mouth once daily  Neetu Barraza MD   Cholecalciferol (VITAMIN D) 50 MCG (2000 UT) CAPS capsule Take 1 capsule by mouth once daily  Neetu Barraza MD   topiramate (TOPAMAX) 25 MG tablet Take 1 tablet by mouth nightly  Neetu Barraza MD   dicyclomine (BENTYL) 10 MG capsule Take 1 capsule by mouth 4 times daily As needed for cramps  Neetu Barraza MD   fexofenadine (ALLEGRA) 180 MG tablet Take 1 tablet by mouth daily Stop zyrtec  Neetu Barraza MD   polyethylene glycol (SM CLEARLAX) 17 GM/SCOOP powder Take 34 g by mouth daily  Neetu Barraza MD   EPINEPHrine (EPIPEN 2-DAMIAN) 0.3 MG/0.3ML SOAJ injection Inject 0.3 mLs into the muscle once for 1 dose Use as directed for allergic reaction  Shade Castro MD   calcium carbonate-vitamin D (CALCIUM 600+D) 600-200 MG-UNIT TABS Take 1 each by mouth 2 times daily  Shade Castro MD   albuterol sulfate HFA (VENTOLIN HFA) 108 (90 Base) MCG/ACT inhaler Inhale 2 puffs into the lungs every 6 hours as needed for Wheezing  Shade Castro MD   ondansetron (ZOFRAN) 4 MG tablet Take 1-2 tablets by mouth every 12 hours as needed for Nausea  Onita Fly, APRN - CNP   triamcinolone (KENALOG) 0.05 % OINT ointment Apply topically 2 times daily To back of the neck  Shade Castro MD   tacrolimus (PROTOPIC) 0.1 % ointment Apply to affected area BID  Allyn Mahmood MD   azelastine (ASTELIN) 0.1 % nasal spray 1 spray by Nasal route 2 times daily Use in each nostril as directed  Shade Castro MD        Allergies   Allergen Reactions    Fruit Acid Concentrate      Tomato, strawberries    Penicillins Hives    Pollen Extract     Tomato Anaphylaxis     Vomiting  Vomiting    Bee Pollen     Fruit & Vegetable Daily  [Cholestatin]     Fruit Extracts     Influenza Vac Split Quad     Meloxicam      Rapid weight gain    Sumatriptan      Sick weak.      Versed [Midazolam] Other (See Comments)     Extremely sleepy with a dose of 2 mg. Reversal used To wake patient up. She states this happened at her colonoscopy the week before also.  Influenza Vaccines Rash     Hives with vaccine.        Past Medical History:   Diagnosis Date    Allergic rhinitis     Chronic back pain     l4-L5 lumbar spinal stenosis    Constipation     Diverticulosis     Hyperlipidemia     Intractable cluster headache syndrome     Mild intermittent asthma without complication 8/9/8392       Past Surgical History:   Procedure Laterality Date    COLONOSCOPY N/A 9/13/2019    COLONOSCOPY biopsy sigmoid colon polyp performed by Van Davis MD at [facility]    endometriosis   [facility] Left 11/10/2017    LEFT L4-L5, L5-S1 TRANSFORAMINAL LUMBAR INTERBODY FUSION WITH with CT navigation    LUMBAR NERVE BLOCK N/A 9/18/2019    L3L4 INTERLAMINAR EPIDURAL STEROID INJECTION WITH FLUOROSCOPY performed by Ty Arroyo MD at [facility] Right 8/5/2019    RIGHT L3 AND L4 TRANSFORAMINAL EPIDURAL STEROID INJECTION WITH FLUOROSCOPY performed by Ty Arroyo MD at [facility] STIMULATOR SURGERY N/A 2/24/2020    SPINAL CORD STIMULATOR TRIAL WITH FLUOROSCOPY ([id number],[id number] [id number]) -  Laura Reddish performed by Ty Arroyo MD at [facility]      one ovary remains    TUBAL LIGATION  1993    UPPER GASTROINTESTINAL ENDOSCOPY N/A 9/13/2019    EGD gastric BIOPSY and GE Junction biopsy and esophageal brush for radha performed by Van Davis MD at [address] [place] [address] [address] Marital status:      Spouse name: Not on file    Number of children: Not on file    Years of education: Not on file    Highest education level: Not on file   Occupational History    Not on file   Tobacco Use    Smoking status: Never Smoker    Smokeless tobacco: Never Used   Vaping Use    Vaping Use: Never used Substance and Sexual Activity    Alcohol use: Yes     Comment: 3-4 times a year    Drug use: No    Sexual activity: Yes     Partners: Male   Other Topics Concern    Not on file   Social History Narrative    Not on file     Social Determinants of Health     Financial Resource Strain: Low Risk     Difficulty of Paying Living Expenses: Not hard at all   Food Insecurity: No Food Insecurity    Worried About [address] in the Last Year: Never true    [address] in the Last Year: Never true   Transportation Needs:     Lack of Transportation (Medical): Not on file    Lack of Transportation (Non-Medical): Not on file   Physical Activity:     Days of Exercise per Week: Not on file    Minutes of Exercise per Session: Not on file   Stress:     Feeling of Stress : Not on file   Social Connections:     Frequency of Communication with Friends and Family: Not on file    Frequency of Social Gatherings with Friends and Family: Not on file    Attends Yazdanism Services: Not on file    Active Member of [address] [place] [address] or Organizations: Not on file    Attends Club or Organization Meetings: Not on file    Marital Status: Not on file   Intimate Partner Violence:     Fear of Current or Ex-Partner: Not on file    Emotionally Abused: Not on file    Physically Abused: Not on file    Sexually Abused: Not on file   Housing Stability:     Unable to Pay for Housing in the Last Year: Not on file    Number of [place] in the Last Year: Not on file    Unstable Housing in the Last Year: Not on file        Family History   Problem Relation Age of Onset    Heart Disease Mother     High Blood Pressure Mother     Diabetes Mother     Heart Disease Father     Stroke Father     High Blood Pressure Father     Heart Disease Paternal Grandmother        There were no vitals filed for this visit. Estimated body mass index is 27.44 kg/m² as calculated from the following:    Height as of 8/18/21: 5' 6\" (1.676 m). Weight as of 4/7/22: 170 lb (77.1 kg). PHYSICAL EXAM  GENERAL:  Pleasant  female who looks her stated age, awake alert and oriented x3, no acute distress. PSYCH:  No psychomotor retardation or agitation. Good eye contact. Unrestricted affect range. Mood congruent with affect. Linear thought.     LABS  Lab Review   Orders Only on 04/18/2022   Component Date Value    Total Protein 04/18/2022 6.4     Albumin 04/18/2022 3.9     Alpha-1-Globulin 04/18/2022 0.2     Alpha-2-Globulin 04/18/2022 0.6     Beta Globulin 04/18/2022 1.0     Gamma Globulin 04/18/2022 0.8     LH 04/18/2022 34.3     FSH 04/18/2022 34.9     Hemoglobin A1C 04/18/2022 5.3     eAG 04/18/2022 105.4     CRP 04/18/2022 <3.0     Cholesterol, Total 04/18/2022 178     Triglycerides 04/18/2022 50     HDL 04/18/2022 83*    LDL Calculated 04/18/2022 85     VLDL Cholesterol Calcula* 04/18/2022 10     Vit D, 25-Hydroxy 04/18/2022 39.5     Sodium 04/18/2022 142     Potassium 04/18/2022 4.1     Chloride 04/18/2022 106     CO2 04/18/2022 26     Anion Gap 04/18/2022 10     Glucose 04/18/2022 96     BUN 04/18/2022 18     CREATININE 04/18/2022 0.5*    GFR Non- 04/18/2022 >60     GFR  04/18/2022 >60     Calcium 04/18/2022 9.1     TSH 04/18/2022 1.28     SPE/TERE Interpretation 04/18/2022 REVIEWED    Orders Only on 07/09/2021   Component Date Value    ALLERGEN SEE NOTE 07/09/2021 See Note    Orders Only on 07/09/2021   Component Date Value    Candida Albicans 07/09/2021 <0.10    Orders Only on 07/09/2021   Component Date Value    Diphtheria IgG Ab 07/09/2021 0.4     Tetanus IgG Ab 07/09/2021 4.1    Orders Only on 07/09/2021   Component Date Value    Complement Activity, Tot* 07/09/2021 27.20*   Orders Only on 07/09/2021   Component Date Value    IgG 1 07/09/2021 414     IgG 2 07/09/2021 179     IgG 3 07/09/2021 8*    IgG 4 07/09/2021 3    Orders Only on 07/09/2021   Component Date Value    IgM 07/09/2021 37.0*   Orders Only on 07/09/2021   Component Date Value    IgA 07/09/2021 77.0    Orders Only on 07/09/2021   Component Date Value    WBC 07/09/2021 2.8*    RBC 07/09/2021 4.49     Hemoglobin 07/09/2021 14.4     Hematocrit 07/09/2021 42.1     MCV 07/09/2021 93.7     MCH 07/09/2021 32.0     MCHC 07/09/2021 34.2     RDW 07/09/2021 13.4     Platelets 63/60/0136 200     MPV 07/09/2021 8.8     Neutrophils % 07/09/2021 48.9     Lymphocytes % 07/09/2021 41.3     Monocytes % 07/09/2021 5.9     Eosinophils % 07/09/2021 3.3     Basophils % 07/09/2021 0.6     Neutrophils Absolute 07/09/2021 1.4*    Lymphocytes Absolute 07/09/2021 1.2     Monocytes Absolute 07/09/2021 0.2     Eosinophils Absolute 07/09/2021 0.1     Basophils Absolute 07/09/2021 0.0    Orders Only on 07/09/2021   Component Date Value    Amylase 07/09/2021 64     Lactic Acid 07/09/2021 0.9     Lipase 07/09/2021 19.0     Sodium 07/09/2021 140     Potassium 07/09/2021 4.4     Chloride 07/09/2021 106     CO2 07/09/2021 25     Anion Gap 07/09/2021 9     Glucose 07/09/2021 85     BUN 07/09/2021 11     CREATININE 07/09/2021 0.5*    GFR Non- 07/09/2021 >60     GFR  07/09/2021 >60     Calcium 07/09/2021 9.0     Total Protein 07/09/2021 6.5     Albumin 07/09/2021 4.6     Albumin/Globulin Ratio 07/09/2021 2.4*    Total Bilirubin 07/09/2021 1.1*    Alkaline Phosphatase 07/09/2021 43     ALT 07/09/2021 29     AST 07/09/2021 23     Globulin 07/09/2021 1.9    There may be more visits with results that are not included. ASSESSMENT/PLAN  1. Presyncope. Multiple food allergies. Patient was hypotensive without hives or angioedema, responded to Toradol IV fluids Zofran but seemingly did not receive methylprednisolone or Benadryl.   However patient reports feeling lightheaded dizzy with visual disturbance short of breath nauseous when exposed to tomatoes peanuts and strawberries in the past.  Though she carefully specified that her dinner should not have any of these, her symptoms were in her view exactly like the symptoms that she experienced when she was exposed to these. The patient does not feel that she was markedly dehydrated, had only had some wine with dinner, despite it being a travel day from [place] to [facility]. Fortunately she experienced no further symptoms for the remainder of her trip, despite being physically active outside, occasionally drinking alcohol. The in-house physician did not treat her for angioedema or urticaria based on my review of the documentation the patient thoughtfully provides. We advocated continued vigilance to promote avoidance, but optimize access to EpiPen's advisable. Prescription provided. She had an  EpiPen with her today, but left it at home when she was traveling abroad. 2. Radiculopathy, lumbosacral region. Postlaminectomy syndrome. Stated history of native hip septic arthritis. Patient historically struggles with chronic low back pain burning, with radiation down the right leg that has evolved to a painless numbness without loss of strength. Status post left L4-S1 fusion  spinal cord stimulator without benefit . In  prescribed topiramate 25 mg at bedtime in conjunction with Lyrica 25 mg p.o. nightly baclofen turmeric. 3. Healthcare maintenance  Colonoscopy  evidently normal.  Up-to-date with health maintenance labs . Previously screened negative for hepatitis C and HIV. We will update health surveillance screening labs at this time. Ordered mammogram 2022, not yet completed, encouraged to do so. Offered Tdap, historically patient declines. Reviewed vaccinated versus unvaccinated hospitalization and mortality data from COVID infection in this COVID vaccination eligible patient for her consideration.       4.  Bitemporal headache  Left mandibular molar shows intentional weight loss, hard work, diet she got down to 140 pounds x 2017, and since slowly regained 30 pounds over the last 5 years. There was a brief plateau when she was on phentermine in 2019, where her weight stayed between 155 and 160, then resumed steady weight gain. She questions whether her higher dose HRT is an appropriate dose, and might be contributing to weight gain. Literature suggests that although some fluid retention from the estrogen component is possible, that this effect is quite variable. But one published systematic review of 22 peer-reviewed articles suggest that most women do not experience significant weight change with estrogen, but that is not to say that this effect is universal.  She ran out of estrogen a few days ago. We should screen for thyroid disease, diabetes. She wanted to go back on phentermine 37.5 mg daily despite the failure to achieve weight loss when she was on it in 2019 (only plateaued weight). We proposed trial of Contrave equivalent amount prescribed May 12, she has gained 3 pound since we last saw her. No follow-ups on file. It was a pleasure to visit with Ms. Triana today. Answered all questions as best I could.   Trey Sepulveda MD   Time 18 minutes

## 2022-06-27 ENCOUNTER — HOSPITAL ENCOUNTER (EMERGENCY)
Age: 57
Discharge: HOME | End: 2022-06-27
Payer: COMMERCIAL

## 2022-06-27 ENCOUNTER — APPOINTMENT (OUTPATIENT)
Dept: CT IMAGING | Age: 57
End: 2022-06-27
Payer: COMMERCIAL

## 2022-06-27 VITALS
SYSTOLIC BLOOD PRESSURE: 104 MMHG | TEMPERATURE: 98 F | OXYGEN SATURATION: 100 % | RESPIRATION RATE: 15 BRPM | HEART RATE: 75 BPM | DIASTOLIC BLOOD PRESSURE: 65 MMHG

## 2022-06-27 DIAGNOSIS — R51.9 NONINTRACTABLE HEADACHE, UNSPECIFIED CHRONICITY PATTERN, UNSPECIFIED HEADACHE TYPE: Primary | ICD-10-CM

## 2022-06-27 DIAGNOSIS — M48.061 SPINAL STENOSIS OF LUMBAR REGION WITHOUT NEUROGENIC CLAUDICATION: ICD-10-CM

## 2022-06-27 PROCEDURE — 96372 THER/PROPH/DIAG INJ SC/IM: CPT

## 2022-06-27 PROCEDURE — 99284 EMERGENCY DEPT VISIT MOD MDM: CPT

## 2022-06-27 PROCEDURE — 70450 CT HEAD/BRAIN W/O DYE: CPT

## 2022-06-27 PROCEDURE — 6360000002 HC RX W HCPCS: Performed by: NURSE PRACTITIONER

## 2022-06-27 PROCEDURE — 6370000000 HC RX 637 (ALT 250 FOR IP): Performed by: NURSE PRACTITIONER

## 2022-06-27 RX ORDER — KETOROLAC TROMETHAMINE 30 MG/ML
30 INJECTION, SOLUTION INTRAMUSCULAR; INTRAVENOUS ONCE
Status: COMPLETED | OUTPATIENT
Start: 2022-06-27 | End: 2022-06-27

## 2022-06-27 RX ORDER — DIPHENHYDRAMINE HYDROCHLORIDE 50 MG/ML
25 INJECTION INTRAMUSCULAR; INTRAVENOUS ONCE
Status: COMPLETED | OUTPATIENT
Start: 2022-06-27 | End: 2022-06-27

## 2022-06-27 RX ORDER — BUTALBITAL, ACETAMINOPHEN AND CAFFEINE 50; 325; 40 MG/1; MG/1; MG/1
1 TABLET ORAL EVERY 4 HOURS PRN
Qty: 60 TABLET | Refills: 0 | Status: SHIPPED | OUTPATIENT
Start: 2022-06-27

## 2022-06-27 RX ORDER — IBUPROFEN 800 MG/1
800 TABLET ORAL EVERY 8 HOURS PRN
Qty: 20 TABLET | Refills: 0 | Status: SHIPPED | OUTPATIENT
Start: 2022-06-27

## 2022-06-27 RX ORDER — BUTALBITAL, ACETAMINOPHEN AND CAFFEINE 50; 325; 40 MG/1; MG/1; MG/1
1 TABLET ORAL ONCE
Status: COMPLETED | OUTPATIENT
Start: 2022-06-27 | End: 2022-06-27

## 2022-06-27 RX ORDER — METOCLOPRAMIDE 10 MG/1
10 TABLET ORAL 4 TIMES DAILY PRN
Qty: 60 TABLET | Refills: 0 | Status: SHIPPED | OUTPATIENT
Start: 2022-06-27 | End: 2022-09-26

## 2022-06-27 RX ORDER — METOCLOPRAMIDE 10 MG/1
10 TABLET ORAL ONCE
Status: COMPLETED | OUTPATIENT
Start: 2022-06-27 | End: 2022-06-27

## 2022-06-27 RX ORDER — BACLOFEN 5 MG/1
TABLET ORAL
Qty: 90 TABLET | Refills: 0 | Status: SHIPPED | OUTPATIENT
Start: 2022-06-27 | End: 2022-10-03

## 2022-06-27 RX ADMIN — METOCLOPRAMIDE 10 MG: 10 TABLET ORAL at 14:21

## 2022-06-27 RX ADMIN — KETOROLAC TROMETHAMINE 30 MG: 30 INJECTION, SOLUTION INTRAMUSCULAR at 14:21

## 2022-06-27 RX ADMIN — DIPHENHYDRAMINE HYDROCHLORIDE 25 MG: 50 INJECTION, SOLUTION INTRAMUSCULAR; INTRAVENOUS at 14:21

## 2022-06-27 RX ADMIN — BUTALBITAL, ACETAMINOPHEN, AND CAFFEINE 1 TABLET: 50; 325; 40 TABLET ORAL at 14:21

## 2022-06-27 ASSESSMENT — PAIN DESCRIPTION - DESCRIPTORS: DESCRIPTORS: ACHING

## 2022-06-27 ASSESSMENT — PAIN - FUNCTIONAL ASSESSMENT: PAIN_FUNCTIONAL_ASSESSMENT: 0-10

## 2022-06-27 ASSESSMENT — PAIN DESCRIPTION - LOCATION: LOCATION: HEAD

## 2022-06-27 ASSESSMENT — PAIN SCALES - GENERAL
PAINLEVEL_OUTOF10: 10
PAINLEVEL_OUTOF10: 3
PAINLEVEL_OUTOF10: 10

## 2022-06-27 NOTE — ED PROVIDER NOTES
[address]  [address] [address]  Dept: [phone]  Loc: [address] ENCOUNTER        This patient was not seen or evaluated by the attending physician. I evaluated this patient, the attending physician was available for consultation. CHIEF COMPLAINT    Chief Complaint   Patient presents with    Headache     x4 days, worse yesterday. hx migraines, home medication not working       HPI    Angie Church is a 55 y.o. female who presents with a headache of gradual onset. Onset was 4 days ago. Worsened overnight. She states that she has tried over-the-counter APAP, sinus medications and antihistamines without any relief. She states that she took \"my migraine medicine\" she states that took it from a 10 out of 10 to an 8 out of 10. She states that her headache has subsequently returned to a 10 out of 10 in level. Is bilateral frontal and posterior. Worsens with bright lights and loud noises. No recent head injury or trauma. The duration has been constant since the onset. The quality of the headache is throbbing and severe. The patient has associated nausea but no vomiting or diarrhea. Was not a thunderclap onset. The patient came into the ED for further evaluation and treatment. REVIEW OF SYSTEMS    Neurologic: see HPI, no LOC, no neck stiffness, no dizziness, no double vision  Cardiac: No Chest Pain, No syncope  Respiratory: No cough or difficulty breathing  GI: No Bloody Stool or Diarrhea  : No Dysuria or Hematuria  General: No Fever  All other systems reviewed and are negative. Juarez Acuna      PAST MEDICAL & SURGICAL HISTORY    Past Medical History:   Diagnosis Date    Allergic rhinitis     Chronic back pain     l4-L5 lumbar spinal stenosis    Constipation     Diverticulosis     Hyperlipidemia     Intractable cluster headache syndrome     Mild intermittent asthma without complication 1/2/6207 Past Surgical History:   Procedure Laterality Date    COLONOSCOPY N/A 9/13/2019    COLONOSCOPY biopsy sigmoid colon polyp performed by Juan Pablo Venegas MD at [address] ([address] [facility])  1999    endometriosis    HYSTERECTOMY, TOTAL ABDOMINAL (CERVIX REMOVED)      one ovary remains    LUMBAR FUSION Left 11/10/2017    LEFT L4-L5, L5-S1 TRANSFORAMINAL LUMBAR INTERBODY FUSION WITH with CT navigation    LUMBAR NERVE BLOCK N/A 9/18/2019    L3L4 INTERLAMINAR EPIDURAL STEROID INJECTION WITH FLUOROSCOPY performed by Narciso Shoemaker MD at [address] [facility] Right 8/5/2019    RIGHT L3 AND L4 TRANSFORAMINAL EPIDURAL STEROID INJECTION WITH FLUOROSCOPY performed by Narciso Shoemaker MD at [address] [facility] SALPINGO-OOPHORECTOMY      unilat - rt - [address] STIMULATOR SURGERY N/A 2/24/2020    SPINAL CORD STIMULATOR TRIAL WITH FLUOROSCOPY (60602, 52403, 32484) -  NEVRO performed by Narciso Shoemaker MD at [facility] ENDOSCOPY N/A 9/13/2019    EGD gastric BIOPSY and GE Junction biopsy and esophageal brush for radha performed by Juan Pablo Venegas MD at [address]    Current Outpatient Rx   Medication Sig Dispense Refill    butalbital-acetaminophen-caffeine (FIORICET, ESGIC) -40 MG per tablet Take 1 tablet by mouth every 4 hours as needed for Headaches or Migraine 60 tablet 0    metoclopramide (REGLAN) 10 MG tablet Take 1 tablet by mouth 4 times daily as needed (headache (take with fioicet); and/or nausea) 60 tablet 0    ibuprofen (IBU) 800 MG tablet Take 1 tablet by mouth every 8 hours as needed for Pain 20 tablet 0    Baclofen (LIORESAL) 5 MG tablet TAKE 1 TABLET BY MOUTH THREE TIMES DAILY AS NEEDED FOR BACK PAIN 90 tablet 0    EPINEPHrine (EPIPEN 2-DAMIAN) 0.3 MG/0.3ML SOAJ injection Inject 0.3 mLs into the muscle once for 1 dose Use as directed for allergic reaction 3 each 1    estrogens, conjugated,-methylTESTOSTERone (EST ESTROGENS-METHYLTEST HS) 0.625-1.25 MG per tablet Take 1 tablet by mouth daily.  90 tablet 1    naltrexone (DEPADE) 50 MG tablet Take 12.5 mg in the morning daily for a week increase to 12.5 mg twice daily for a week then 25 mg in the morning 12.5 mg in the evening for a week then 25 mg twice a day thereafter 18 tablet 1    buPROPion (WELLBUTRIN SR) 100 MG extended release tablet Take 100 mg in the morning daily for a week increase to 100 mg twice daily for a week then 200 mg in the morning 100 mg in the evening for a week then 200 mg twice a day thereafter 70 tablet 0    pregabalin (LYRICA) 25 MG capsule Take 1 capsule by mouth twice daily 60 capsule 5    Est Estrogens-Methyltest DS 1.25-2.5 MG TABS Take 1 tablet by mouth once daily 90 tablet 0    lansoprazole (PREVACID) 30 MG delayed release capsule Take 1 capsule by mouth once daily 90 capsule 3    Cholecalciferol (VITAMIN D) 50 MCG (2000 UT) CAPS capsule Take 1 capsule by mouth once daily 90 capsule 1    topiramate (TOPAMAX) 25 MG tablet Take 1 tablet by mouth nightly 90 tablet 1    dicyclomine (BENTYL) 10 MG capsule Take 1 capsule by mouth 4 times daily As needed for cramps 60 capsule 1    fexofenadine (ALLEGRA) 180 MG tablet Take 1 tablet by mouth daily Stop zyrtec 30 tablet 5    polyethylene glycol (SM CLEARLAX) 17 GM/SCOOP powder Take 34 g by mouth daily 510 g 11    calcium carbonate-vitamin D (CALCIUM 600+D) 600-200 MG-UNIT TABS Take 1 each by mouth 2 times daily 60 tablet 11    albuterol sulfate HFA (VENTOLIN HFA) 108 (90 Base) MCG/ACT inhaler Inhale 2 puffs into the lungs every 6 hours as needed for Wheezing 1 Inhaler 3    ondansetron (ZOFRAN) 4 MG tablet Take 1-2 tablets by mouth every 12 hours as needed for Nausea 30 tablet 0    triamcinolone (KENALOG) 0.05 % OINT ointment Apply topically 2 times daily To back of the neck 430 g 0    tacrolimus (PROTOPIC) 0.1 % ointment Apply to affected area BID 30 g 4    azelastine (ASTELIN) 0.1 % nasal spray 1 spray by Nasal route 2 times daily Use in each nostril as directed 1 Bottle 5       ALLERGIES    Allergies   Allergen Reactions    Fruit Acid Concentrate      Tomato, strawberries    Penicillins Hives    Pollen Extract     Tomato Anaphylaxis     Vomiting  Vomiting    Bee Pollen     Fruit & Vegetable Daily  [Cholestatin]     Fruit Extracts     Influenza Vac Split Quad     Meloxicam      Rapid weight gain    Sumatriptan      Sick weak.  Versed [Midazolam] Other (See Comments)     Extremely sleepy with a dose of 2 mg. Reversal used To wake patient up. She states this happened at her colonoscopy the week before also.  Influenza Vaccines Rash     Hives with vaccine. SOCIAL & FAMILY HISTORY    Social History     Socioeconomic History    Marital status:      Spouse name: None    Number of children: None    Years of education: None    Highest education level: None   Occupational History    None   Tobacco Use    Smoking status: Never Smoker    Smokeless tobacco: Never Used   Vaping Use    Vaping Use: Never used   Substance and Sexual Activity    Alcohol use: Yes     Comment: 3-4 times a year    Drug use: No    Sexual activity: Yes     Partners: Male   Other Topics Concern    None   Social History Narrative    None     Social Determinants of Health     Financial Resource Strain: Low Risk     Difficulty of Paying Living Expenses: Not hard at all   Food Insecurity: No Food Insecurity    Worried About Running Out of Food in the Last Year: Never true    [place] of Food in the Last Year: Never true   Transportation Needs:     Lack of Transportation (Medical): Not on file    Lack of Transportation (Non-Medical):  Not on file   Physical Activity:     Days of Exercise per Week: Not on file    Minutes of Exercise per Session: Not on file   Stress:     Feeling of Stress : Not on file   Social Connections:     Frequency of Communication with Friends and Family: Not on file    Frequency of Social Gatherings with Friends and Family: Not on file    Attends Jain Services: Not on file    Active Member of Clubs or Organizations: Not on file    Attends Club or Organization Meetings: Not on file    Marital Status: Not on file   Intimate Partner Violence:     Fear of Current or Ex-Partner: Not on file    Emotionally Abused: Not on file    Physically Abused: Not on file    Sexually Abused: Not on file   Housing Stability:     Unable to Pay for Housing in the Last Year: Not on file    Number of [place] in the Last Year: Not on file    Unstable Housing in the Last Year: Not on file     Family History   Problem Relation Age of Onset    Heart Disease Mother     High Blood Pressure Mother     Diabetes Mother     Heart Disease Father     Stroke Father     High Blood Pressure Father     Heart Disease Paternal Grandmother        PHYSICAL EXAM    VITAL SIGNS: /83   Pulse 76   Temp 98 °F (36.7 °C) (Oral)   Resp 16   LMP 01/13/1999   SpO2 100%    Constitutional:  Well developed, well nourished, no acute distress   Eyes: Pupils equally round and reactive to light, extraocular movement are intact  Vascular: No temporal artery tenderness, Radial and DP pulses 2+ and equal bilaterally  HENT:  Atraumatic, moist mucus membranes, airway patent, no hemotympanum bilaterally  Neck: supple, no JVD, no posterior tenderness, no nuchal rigidity, no meningismus  Respiratory:  Lungs clear to auscultation bilaterally, no retractions   Cardiovascular:  regular rate, no murmurs  GI:  Soft, nontender, normal bowel sounds  Musculoskeletal:  No edema, no acute deformities  Integument:  Well hydrated, no petechiae   Neurologic:  Awake, alert, oriented x4, no aphasia, no slurred speech, CN II-XII intact, normal finger to nose test bilaterally, 5/5 strength in all 4 extremities, no ataxia or gait abnormalities  Psych: Pleasant affect, no hallucinations    RADIOLOGY    CT Head WO Contrast   Preliminary Result   No evidence of acute intracranial abnormality. ED COURSE & MEDICAL DECISION MAKING    Pertinent Labs & Imaging studies reviewed and interpreted. (See chart for details)  See chart for details of medications given during the ED stay. Vitals:    06/27/22 1306   BP: 136/83   Pulse: 76   Resp: 16   Temp: 98 °F (36.7 °C)   TempSrc: Oral   SpO2: 100%       Differential Diagnosis: intracranial abnormality including hemorrhage, Meningitis, Temporal arteritis, Pseudotumor Cerebri, Migraine, Mass, postconcussive syndrome, other    Patient is afebrile and nontoxic in appearance. Neuro exam unremarkable. Is not pregnant as she has had a hysterectomy in '99. CT findings as above. Reevaluation at 1600: Patient states the pain is much improved after medications given in the ED. I believe the patient is safe for discharge at this time. I will prescribe Fioricet, Reglan and NSAIDs and provide referral to neurology. She needs to keep a headache diary until her follow-up. She verbalized understanding to return immediately for any new or worsening symptoms. Remained afebrile and hemodynamically stable throughout her entire ED course and will be discharged home in stable condition    No results found for this visit on 06/27/22. I estimate there is LOW risk for SUBARACHNOID HEMORRHAGE, MENINGITIS, INTRACRANIAL HEMORRHAGE, SUBDURAL HEMATOMA, OR STROKE, thus I consider the discharge disposition reasonable. Camilla Coles and I have discussed the diagnosis and risks, and we agree with discharging home to follow-up with their primary doctor in 2-3 days. We also discussed returning to the Emergency Department immediately if new or worsening symptoms occur. We have discussed the symptoms which are most concerning (e.g., changing or worsening pain, weakness, vomiting, fever) that necessitate immediate return.   Patient verbalized understanding, has no further questions or concerns with normal be discharged home in stable condition. Discharge Vital Signs:  Blood pressure 136/83, pulse 76, temperature 98 °F (36.7 °C), temperature source Oral, resp. rate 16, last menstrual period 01/13/1999, SpO2 100 %, not currently breastfeeding. FINAL IMPRESSION    1.  Nonintractable headache, unspecified chronicity pattern, unspecified headache type        PLAN   Discharge with outpatient follow-up and instructions (see EMR)    (Please note that this note was completed with a voice recognition program.  Every attempt was made to edit the dictations, but inevitably there remain words that are mis-transcribed.)         Errol Manriquez, HANS - JERROD  06/27/22 7828

## 2022-06-27 NOTE — Clinical Note
Silvino Valentino was seen and treated in our emergency department on 6/27/2022. She may return to work on 06/29/2022. If you have any questions or concerns, please don't hesitate to call.       Margarita Zavala, APRN - CNP

## 2022-06-27 NOTE — TELEPHONE ENCOUNTER
Medication:   Requested Prescriptions     Pending Prescriptions Disp Refills    Baclofen (LIORESAL) 5 MG tablet [Pharmacy Med Name: Baclofen 5 MG Oral Tablet] 90 tablet 0     Sig: TAKE 1 TABLET BY MOUTH THREE TIMES DAILY AS NEEDED FOR BACK PAIN        Last Filled:      Patient Phone Number: [phone] (home)     Last appt: 6/23/2022   Next appt: Visit date not found    Last OARRS: No flowsheet data found.

## 2022-07-07 RX ORDER — BUPROPION HYDROCHLORIDE 200 MG/1
TABLET, EXTENDED RELEASE ORAL
Qty: 60 TABLET | Refills: 3 | Status: SHIPPED | OUTPATIENT
Start: 2022-07-07

## 2022-07-08 ENCOUNTER — PATIENT MESSAGE (OUTPATIENT)
Dept: PRIMARY CARE CLINIC | Age: 57
End: 2022-07-08

## 2022-07-08 DIAGNOSIS — M62.838 MUSCLE SPASM: ICD-10-CM

## 2022-07-08 DIAGNOSIS — K59.04 CHRONIC IDIOPATHIC CONSTIPATION: ICD-10-CM

## 2022-07-08 RX ORDER — BUPROPION HYDROCHLORIDE 200 MG/1
TABLET, EXTENDED RELEASE ORAL
Qty: 60 TABLET | Refills: 3 | OUTPATIENT
Start: 2022-07-08

## 2022-07-08 RX ORDER — POLYETHYLENE GLYCOL 3350 17 G/17G
34 POWDER, FOR SOLUTION ORAL DAILY
Qty: 510 G | Refills: 11 | OUTPATIENT
Start: 2022-07-08

## 2022-07-08 RX ORDER — POLYETHYLENE GLYCOL 3350 17 G/17G
34 POWDER, FOR SOLUTION ORAL DAILY
Qty: 510 G | Refills: 11 | Status: SHIPPED | OUTPATIENT
Start: 2022-07-08

## 2022-07-08 RX ORDER — B-COMPLEX WITH VITAMIN C
1 TABLET ORAL 2 TIMES DAILY
Qty: 180 TABLET | Refills: 1 | Status: SHIPPED | OUTPATIENT
Start: 2022-07-08

## 2022-07-08 RX ORDER — NALTREXONE HYDROCHLORIDE 50 MG/1
TABLET, FILM COATED ORAL
Qty: 18 TABLET | Refills: 1 | OUTPATIENT
Start: 2022-07-08

## 2022-07-08 RX ORDER — NALTREXONE HYDROCHLORIDE 50 MG/1
TABLET, FILM COATED ORAL
Qty: 30 TABLET | Refills: 1 | Status: SHIPPED | OUTPATIENT
Start: 2022-07-08 | End: 2022-08-01 | Stop reason: SDUPTHER

## 2022-08-01 ENCOUNTER — OFFICE VISIT (OUTPATIENT)
Dept: PRIMARY CARE CLINIC | Age: 57
End: 2022-08-01
Payer: COMMERCIAL

## 2022-08-01 VITALS
TEMPERATURE: 97.2 F | OXYGEN SATURATION: 95 % | HEART RATE: 80 BPM | SYSTOLIC BLOOD PRESSURE: 118 MMHG | WEIGHT: 169 LBS | BODY MASS INDEX: 27.28 KG/M2 | DIASTOLIC BLOOD PRESSURE: 76 MMHG

## 2022-08-01 DIAGNOSIS — R09.81 NASAL CONGESTION: Primary | ICD-10-CM

## 2022-08-01 PROCEDURE — 99213 OFFICE O/P EST LOW 20 MIN: CPT | Performed by: INTERNAL MEDICINE

## 2022-08-01 RX ORDER — NALTREXONE HYDROCHLORIDE 50 MG/1
TABLET, FILM COATED ORAL
Qty: 30 TABLET | Refills: 1 | Status: SHIPPED | OUTPATIENT
Start: 2022-08-01

## 2022-08-01 SDOH — ECONOMIC STABILITY: FOOD INSECURITY: WITHIN THE PAST 12 MONTHS, THE FOOD YOU BOUGHT JUST DIDN'T LAST AND YOU DIDN'T HAVE MONEY TO GET MORE.: NEVER TRUE

## 2022-08-01 SDOH — ECONOMIC STABILITY: FOOD INSECURITY: WITHIN THE PAST 12 MONTHS, YOU WORRIED THAT YOUR FOOD WOULD RUN OUT BEFORE YOU GOT MONEY TO BUY MORE.: NEVER TRUE

## 2022-08-01 ASSESSMENT — PATIENT HEALTH QUESTIONNAIRE - PHQ9
1. LITTLE INTEREST OR PLEASURE IN DOING THINGS: 0
SUM OF ALL RESPONSES TO PHQ QUESTIONS 1-9: 0
2. FEELING DOWN, DEPRESSED OR HOPELESS: 0
SUM OF ALL RESPONSES TO PHQ QUESTIONS 1-9: 0
SUM OF ALL RESPONSES TO PHQ9 QUESTIONS 1 & 2: 0
SUM OF ALL RESPONSES TO PHQ QUESTIONS 1-9: 0
SUM OF ALL RESPONSES TO PHQ QUESTIONS 1-9: 0

## 2022-08-01 ASSESSMENT — SOCIAL DETERMINANTS OF HEALTH (SDOH): HOW HARD IS IT FOR YOU TO PAY FOR THE VERY BASICS LIKE FOOD, HOUSING, MEDICAL CARE, AND HEATING?: NOT HARD AT ALL

## 2022-08-01 NOTE — PROGRESS NOTES
2022    Vini Arzate (:  1965) is a 55 y.o. female, here for evaluation of the following medical concerns:    No chief complaint on file. 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 pylori, pseudoclaudication improved but not resolved with lumbar laminectomy in 2017 on baclofen turmeric Lyrica and Topamax active prescriptions for all, chronic constipation on Linzess 145 mg, who comes in for a presyncopal spell strongly reminiscent of prior allergen exposures while occasioning in [facility].  Patient states known food allergies to strawberries tomatoes and peanuts. Sometime ago, she saw an allergist, desensitization to peanuts did not apparently work. The patient felt well before her trip, had a good trip, but on the evening of arrival in [facility], after having a couple of bites of her dinner, began to experience lightheadedness and dizziness nausea progressing to shortness of breath graying out of vision for which the in-house physician evaluated her. She brings the record which shows blood pressure was down to 80/40 pulse 145, no EKG performed. She responded to IV fluids ketorolac and Zofran. She spent about 4 hours in his in-house clinic. She then went to her room and slept, and felt better the next day. She did not experience recurrence of symptoms for the remainder of the trip, and specifically no fever diarrhea swelling wheeze hives. Review of Systems   Constitutional: Negative for activity change, appetite change, fatigue and unexpected weight change. HENT: Negative for dental problem, sinus pain, sore throat and trouble swallowing. Eyes: Negative for pain and visual disturbance. Respiratory: Negative for apnea, cough, chest tightness, shortness of breath and wheezing. Cardiovascular: Negative for chest pain and palpitations.    Gastrointestinal: Negative for abdominal pain, blood in stool, constipation, diarrhea, nausea, rectal pain and vomiting. Endocrine: Negative for cold intolerance, heat intolerance, polydipsia, polyphagia and polyuria. Genitourinary: Negative for difficulty urinating, dysuria, flank pain, frequency, hematuria, pelvic pain, urgency, vaginal bleeding and vaginal discharge. Musculoskeletal: Negative for arthralgias, back pain, gait problem, joint swelling, myalgias, neck pain and neck stiffness. Skin: Negative for color change and rash. Neurological: Negative for dizziness, tremors, syncope, speech difficulty, weakness, light-headedness and headaches. Hematological: Negative for adenopathy. Does not bruise/bleed easily. Psychiatric/Behavioral: Negative for agitation, behavioral problems, decreased concentration, sleep disturbance and suicidal ideas. The patient is not nervous/anxious and is not hyperactive. Prior to Visit Medications    Medication Sig Taking?  Authorizing Provider   calcium carbonate-vitamin D (CALCIUM 600+D) 600-200 MG-UNIT TABS Take 1 tablet by mouth 2 times daily  Eloy Johansen MD   naltrexone (DEPADE) 50 MG tablet 25 mg twice a day  Eloy Joahnsen MD   polyethylene glycol (SM CLEARLAX) 17 GM/SCOOP powder Take 34 g by mouth daily  Eloy Johansen MD   buPROPion [facility] SR) 200 MG extended release tablet one 200 MG tab bid  Eloy Johansen MD   Baclofen (LIORESAL) 5 MG tablet TAKE 1 TABLET BY MOUTH THREE TIMES DAILY AS NEEDED FOR BACK PAIN  Eloy Johansen MD   butalbital-acetaminophen-caffeine (FIORICET, ESGIC) -40 MG per tablet Take 1 tablet by mouth every 4 hours as needed for Headaches or Migraine  HANS Cerrato CNP   metoclopramide (REGLAN) 10 MG tablet Take 1 tablet by mouth 4 times daily as needed (headache (take with fioicet); and/or nausea)  HANS Cerrato CNP   ibuprofen (IBU) 800 MG tablet Take 1 tablet by mouth every 8 hours as needed for Pain  HANS Cerrato CNP   EPINEPHrine (EPIPEN 2-DAMIAN) 0.3 MG/0.3ML SOAJ injection Inject 0.3 mLs into the muscle once for 1 dose Use as directed for allergic reaction  Aaron Fields MD   estrogens, conjugated,-methylTESTOSTERone (EST ESTROGENS-METHYLTEST HS) 0.625-1.25 MG per tablet Take 1 tablet by mouth daily.   Aaron Fields MD   pregabalin (LYRICA) 25 MG capsule Take 1 capsule by mouth twice daily  Aaron Fields MD   Est Estrogens-Methyltest DS 1.25-2.5 MG TABS Take 1 tablet by mouth once daily  Aaron Fields MD   lansoprazole (PREVACID) 30 MG delayed release capsule Take 1 capsule by mouth once daily  Aaron Fields MD   Cholecalciferol (VITAMIN D) 50 MCG (2000 UT) CAPS capsule Take 1 capsule by mouth once daily  Aaron Fields MD   topiramate (TOPAMAX) 25 MG tablet Take 1 tablet by mouth nightly  Aaron Fields MD   dicyclomine (BENTYL) 10 MG capsule Take 1 capsule by mouth 4 times daily As needed for cramps  Aaron Fields MD   fexofenadine (ALLEGRA) 180 MG tablet Take 1 tablet by mouth daily Stop zyrtec  Aaron Fields MD   albuterol sulfate HFA (VENTOLIN HFA) 108 (90 Base) MCG/ACT inhaler Inhale 2 puffs into the lungs every 6 hours as needed for Wheezing  Yodit Puentes MD   ondansetron (ZOFRAN) 4 MG tablet Take 1-2 tablets by mouth every 12 hours as needed for Nausea  Verlene Organ, APRN - CNP   triamcinolone (KENALOG) 0.05 % OINT ointment Apply topically 2 times daily To back of the neck  Yodit Puentes MD   tacrolimus (PROTOPIC) 0.1 % ointment Apply to affected area BID  Shauna Rincon MD   azelastine (ASTELIN) 0.1 % nasal spray 1 spray by Nasal route 2 times daily Use in each nostril as directed  Yodit Puentes MD        Allergies   Allergen Reactions    Fruit Acid Concentrate      Tomato, strawberries    Penicillins Hives    Pollen Extract     Tomato Anaphylaxis     Vomiting  Vomiting    Bee Pollen     Fruit & Vegetable Daily  [Cholestatin]     Fruit Extracts     Influenza Vac Split Quad     Meloxicam      Rapid weight gain Sumatriptan      Sick weak. Versed [Midazolam] Other (See Comments)     Extremely sleepy with a dose of 2 mg. Reversal used To wake patient up. She states this happened at her colonoscopy the week before also. Influenza Vaccines Rash     Hives with vaccine.        Past Medical History:   Diagnosis Date    Allergic rhinitis     Chronic back pain     l4-L5 lumbar spinal stenosis    Constipation     Diverticulosis     Hyperlipidemia     Intractable cluster headache syndrome     Mild intermittent asthma without complication 3/2/6094       Past Surgical History:   Procedure Laterality Date    COLONOSCOPY N/A 9/13/2019    COLONOSCOPY biopsy sigmoid colon polyp performed by Veronica Garrison MD at [address] [facility] ([address] [place] [address])  1999    endometriosis    HYSTERECTOMY, TOTAL ABDOMINAL (CERVIX REMOVED)      one ovary remains    LUMBAR FUSION Left 11/10/2017    LEFT L4-L5, L5-S1 TRANSFORAMINAL LUMBAR INTERBODY FUSION WITH with CT navigation    LUMBAR NERVE BLOCK N/A 9/18/2019    L3L4 INTERLAMINAR EPIDURAL STEROID INJECTION WITH FLUOROSCOPY performed by Nancy Mccall MD at [facility] [address] Right 8/5/2019    RIGHT L3 AND L4 TRANSFORAMINAL EPIDURAL STEROID INJECTION WITH FLUOROSCOPY performed by Nancy Mccall MD at [address] [facility]""    SALPINGO-OOPHORECTOMY      unilat - rt - WITH HYSTERECTOMY    SPINAL CORD STIMULATOR SURGERY N/A 2/24/2020    SPINAL CORD STIMULATOR TRIAL WITH FLUOROSCOPY ([id number], [id number], [id number]) -  [facility] Host performed by Nancy Mccall MD at [place] [address] N/A 9/13/2019    EGD gastric BIOPSY and GE Junction biopsy and esophageal brush for radha performed by Veronica Garrison MD at [address] [facility] History     Socioeconomic History    Marital status:      Spouse name: Not on file    Number of children: Not on file    Years of education: Not on file    Highest education level: Not on file   Occupational History Not on file   Tobacco Use    Smoking status: Never    Smokeless tobacco: Never   Vaping Use    Vaping Use: Never used   Substance and Sexual Activity    Alcohol use: Yes     Comment: 3-4 times a year    Drug use: No    Sexual activity: Yes     Partners: Male   Other Topics Concern    Not on file   Social History Narrative    Not on file     Social Determinants of Health     Financial Resource Strain: Not on file   Food Insecurity: Not on file   Transportation Needs: Not on file   Physical Activity: Not on file   Stress: Not on file   Social Connections: Not on file   Intimate Partner Violence: Not on file   Housing Stability: Not on file        Family History   Problem Relation Age of Onset    Heart Disease Mother     High Blood Pressure Mother     Diabetes Mother     Heart Disease Father     Stroke Father     High Blood Pressure Father     Heart Disease Paternal Grandmother        There were no vitals filed for this visit. Estimated body mass index is 27.92 kg/m² as calculated from the following:    Height as of 8/18/21: 5' 6\" (1.676 m). Weight as of 6/23/22: 173 lb (78.5 kg). PHYSICAL EXAM  GENERAL:  Pleasant  female who looks her stated age, awake alert and oriented x3, no acute distress. PSYCH:  No psychomotor retardation or agitation. Good eye contact. Unrestricted affect range. Mood congruent with affect. Linear thought.     LABS  Lab Review   Orders Only on 04/18/2022   Component Date Value    Total Protein 04/18/2022 6.4     Albumin 04/18/2022 3.9     Alpha-1-Globulin 04/18/2022 0.2     Alpha-2-Globulin 04/18/2022 0.6     Beta Globulin 04/18/2022 1.0     Gamma Globulin 04/18/2022 0.8     LH 04/18/2022 34.3     FSH 04/18/2022 34.9     Hemoglobin A1C 04/18/2022 5.3     eAG 04/18/2022 105.4     CRP 04/18/2022 <3.0     Cholesterol, Total 04/18/2022 178     Triglycerides 04/18/2022 50     HDL 04/18/2022 83 (A)    LDL Calculated 04/18/2022 85     VLDL Cholesterol Calcula* 04/18/2022 10 Vit D, 25-Hydroxy 04/18/2022 39.5     Sodium 04/18/2022 142     Potassium 04/18/2022 4.1     Chloride 04/18/2022 106     CO2 04/18/2022 26     Anion Gap 04/18/2022 10     Glucose 04/18/2022 96     BUN 04/18/2022 18     Creatinine 04/18/2022 0.5 (A)    GFR Non- 04/18/2022 >60     GFR  04/18/2022 >60     Calcium 04/18/2022 9.1     TSH 04/18/2022 1.28     SPE/TERE Interpretation 04/18/2022 REVIEWED    Orders Only on 07/09/2021   Component Date Value    ALLERGEN SEE NOTE 07/09/2021 See Note    Orders Only on 07/09/2021   Component Date Value    Candida Albicans 07/09/2021 <0.10    Orders Only on 07/09/2021   Component Date Value    Diphtheria IgG Ab 07/09/2021 0.4     Tetanus IgG Ab 07/09/2021 4.1    Orders Only on 07/09/2021   Component Date Value    Complement Activity, Tot* 07/09/2021 27.20 (A)   Orders Only on 07/09/2021   Component Date Value    IgG 1 07/09/2021 414     IgG 2 07/09/2021 179     IgG 3 07/09/2021 8 (A)    IgG 4 07/09/2021 3    Orders Only on 07/09/2021   Component Date Value    IgM 07/09/2021 37.0 (A)   Orders Only on 07/09/2021   Component Date Value    IgA 07/09/2021 77.0    Orders Only on 07/09/2021   Component Date Value    WBC 07/09/2021 2.8 (A)    RBC 07/09/2021 4.49     Hemoglobin 07/09/2021 14.4     Hematocrit 07/09/2021 42.1     MCV 07/09/2021 93.7     MCH 07/09/2021 32.0     MCHC 07/09/2021 34.2     RDW 07/09/2021 13.4     Platelets 50/95/1321 200     MPV 07/09/2021 8.8     Neutrophils % 07/09/2021 48.9     Lymphocytes % 07/09/2021 41.3     Monocytes % 07/09/2021 5.9     Eosinophils % 07/09/2021 3.3     Basophils % 07/09/2021 0.6     Neutrophils Absolute 07/09/2021 1.4 (A)    Lymphocytes Absolute 07/09/2021 1.2     Monocytes Absolute 07/09/2021 0.2     Eosinophils Absolute 07/09/2021 0.1     Basophils Absolute 07/09/2021 0.0    Orders Only on 07/09/2021   Component Date Value    Amylase 07/09/2021 64     Lactic Acid 07/09/2021 0.9     Lipase 07/09/2021 GERD.  History of H. pylori. EGD August 2020 raise question of NUD though patient not on TCA but PPI Carafate. H. pylori detected on biopsy, subsequently treated. Able to pass 46 Irish dilator without mucosal tearing. Results of multiple biopsies not available in our system, 2 small patches proximal esophagus, 2 mm columnar epithelium island just above the Z-line biopsied. Should re test to document successful eradication, perhaps asymptomatic status is reassuring. Patient chose not to complete the fecal antigen test.  Pursue when desires. 6. Constipation. Presumptive constipation predominant IBS. Colonoscopy 9/3/19 to the cecum but not TI found extremely long, redundant colon with excision small rectal polyp. Bacterial overgrowth possible, visceral hypersensitivity, NUD, biliary disease in ddx. Should patient wish to pursue, would need AXR assess stool burden and trial of lactulose vs Zelnorm vs Motegrity. Address H pylori, above. Trial nortripthyline if other interventions ineffective or unaffordable. 7.  Positional left arm numbness. Only recently, when patient sleeping on right side notices that her left arm goes numb but not weak or cold or pale. Negative Spurling test.  Weak thoracic outlet findings on exam, but more of a paresthesia rather than ischemia presentation, though mild ischemia can present like this. May represent something of a brachial plexopathy stretch. We ordered EMG, may need at least chest x-ray, with plan refer for thoracic outlet physical therapy if EMG unrevealing or suggestive of brachial plexus. Pt chose not to pursue. 8.  Weight gain. Vasomotor instability history. Status post hysterectomy 1999.      2014 patient weighed 205 pounds, with intentional weight loss, hard work, diet she got down to 140 pounds x 2017, and since slowly regained 30 pounds over the last 5 years.   There was a brief plateau when she was on phentermine in 2019, where her weight stayed between 155 and 160, then resumed steady weight gain. She questions whether her higher dose HRT is an appropriate dose, and might be contributing to weight gain. Literature suggests that although some fluid retention from the estrogen component is possible, that this effect is quite variable. But one published systematic review of 22 peer-reviewed articles suggest that most women do not experience significant weight change with estrogen, but that is not to say that this effect is universal.  She ran out of estrogen a few days ago. We should screen for thyroid disease, diabetes. She wanted to go back on phentermine 37.5 mg daily despite the failure to achieve weight loss when she was on it in 2019 (only plateaued weight). We proposed trial of Contrave equivalent amount prescribed May 12, initially gained then lost 4 pounds she has gained 3 pound since we last saw her: 170 pounds April 2022, 169 pounds now. Recommendations require greater than 5% body weight loss, I am skeptical that we are seeing much response here. Patient would like to try another couple of months, prescription provided. 9.  Cough. Voice changes. Many years ago was told she had LER. Patient has worsening minimally productive cough, voice changes; along with occasional wheeze occasional clear rhinorrhea, without fever. Endorses compliance with Prevacid. I have asked her to do Angela pot for a week, follow-up with ENT to reexamine her larynx and nasopharynx for sources for cough and voice changes. No follow-ups on file. It was a pleasure to visit with Ms. Triana today. Answered all questions as best I could.   Juana Joe MD   Time 23 minutes

## 2022-08-01 NOTE — PATIENT INSTRUCTIONS
1.  Goal greater than 5% weight loss on Contrave-like regimen with 3 months therapy, that is 8-1/2 pounds. You are down 4 pounds since June 23, so detention there.     2.  ENT referral

## 2022-08-15 DIAGNOSIS — H65.00 ACUTE SEROUS OTITIS MEDIA, RECURRENCE NOT SPECIFIED, UNSPECIFIED LATERALITY: ICD-10-CM

## 2022-08-16 NOTE — TELEPHONE ENCOUNTER
Future Appointments    Encounter Information    Provider Department Appt Notes   8/24/2022 Manuel Willingham MD [facility] ENT new pt congestion       Past Visits    Date Provider Specialty Visit Type Primary Dx   08/01/2022 Murray Velasquez MD Primary Care Office Visit Nasal congestion

## 2022-08-17 RX ORDER — FEXOFENADINE HYDROCHLORIDE 180 MG/1
TABLET, FILM COATED ORAL
Qty: 30 TABLET | Refills: 3 | Status: SHIPPED | OUTPATIENT
Start: 2022-08-17

## 2022-08-24 ENCOUNTER — OFFICE VISIT (OUTPATIENT)
Dept: ENT CLINIC | Age: 57
End: 2022-08-24
Payer: COMMERCIAL

## 2022-08-24 VITALS
RESPIRATION RATE: 16 BRPM | WEIGHT: 164 LBS | BODY MASS INDEX: 26.36 KG/M2 | DIASTOLIC BLOOD PRESSURE: 73 MMHG | SYSTOLIC BLOOD PRESSURE: 111 MMHG | HEART RATE: 77 BPM | HEIGHT: 66 IN

## 2022-08-24 DIAGNOSIS — R13.10 DYSPHAGIA, UNSPECIFIED TYPE: ICD-10-CM

## 2022-08-24 DIAGNOSIS — R49.0 DYSPHONIA: ICD-10-CM

## 2022-08-24 DIAGNOSIS — R09.81 NASAL CONGESTION: Primary | ICD-10-CM

## 2022-08-24 DIAGNOSIS — J34.2 DEVIATED NASAL SEPTUM: ICD-10-CM

## 2022-08-24 DIAGNOSIS — J04.0 LARYNGITIS: ICD-10-CM

## 2022-08-24 DIAGNOSIS — J34.89 NASAL OBSTRUCTION: ICD-10-CM

## 2022-08-24 DIAGNOSIS — J31.0 CHRONIC RHINITIS: ICD-10-CM

## 2022-08-24 DIAGNOSIS — R49.0 HOARSENESS: Primary | ICD-10-CM

## 2022-08-24 PROCEDURE — 99203 OFFICE O/P NEW LOW 30 MIN: CPT | Performed by: STUDENT IN AN ORGANIZED HEALTH CARE EDUCATION/TRAINING PROGRAM

## 2022-08-24 PROCEDURE — 31231 NASAL ENDOSCOPY DX: CPT | Performed by: STUDENT IN AN ORGANIZED HEALTH CARE EDUCATION/TRAINING PROGRAM

## 2022-08-24 RX ORDER — FLUTICASONE FUROATE 27.5 UG/1
2 SPRAY, METERED NASAL 2 TIMES DAILY
Qty: 1 EACH | Refills: 3 | Status: SHIPPED | OUTPATIENT
Start: 2022-08-24

## 2022-08-24 NOTE — PROGRESS NOTES
[address] [facility] NECK SURGERY  CONSULT      Mario Hylton (:  1965) is a 55 y.o. female, here for evaluation of the following chief complaint(s):  Sinus Problem      ASSESSMENT/PLAN:  1. Nasal congestion  2. Nasal obstruction  3. Dysphagia, unspecified type  4. Dysphonia  5. Laryngitis      This is a very pleasant 55 y.o. female here today for evaluation of the the above-noted complaints. I suspect that the patient's sinonasal complaints are related to nasal allergies. She previously did not tolerate normal strength fluticasone. I would like to start her on Sensa mist to see if this is better tolerated. If she fails this, we may consider a different nasal steroid spray. I have asked the patient to continue her saline irrigations. The patient's laryngitis/dysphonia is likely related to voice overuse through her work. I would like to refer the patient to my colleague in speech and language pathology for videostrobe and voice therapy. Medical Decision Making: The following items were considered in medical decision making:  Independent review of images  Review / order clinical lab tests  Review / order radiology tests  Decision to obtain old records  Review and summation of old records as accessed through [facility] if applicable    SUBJECTIVE/OBJECTIVE:  HPI    Mario Hylton is here today for evaluation of  nasal congestion and voice changes. 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 Has never smoked. Rare alcohol use    Patient has longstanding nasal congestion, facial pain and pressure. Deviously tried azelastine, did not think that this helped. Fluticasone makes head hurt  Gets facial pain and pressure located in between the eyes and radiating to the cheeks. With nasal congestion intermittently. Thinks that saline irrigations have helped.   States that she gets multiple sinus infections a year. Is a smell is okay. REVIEW OF SYSTEMS  The following systems were reviewed and revealed the following in addition to any already discussed in the HPI:    PHYSICAL EXAM    GENERAL: No acute distress, alert and oriented, hoarse/rough voice  EYES: EOMI, Anti-icteric  HENT:   Head: Normocephalic and atraumatic. Face:  Symmetric, facial nerve intact  Right Ear: Normal external ear, normal external auditory canal, intact tympanic membrane with normal mobility and aerated middle ear  Left Ear: Normal external ear, normal external auditory canal, intact tympanic membrane with normal mobility and aerated middle ear  Mouth/Oral Cavity:  normal lips, Uvula is midline, no mucosal lesions, no trismus, normal dentition, normal salivary quality/flow  Oropharynx/Larynx:  normal oropharynx, normal-appearing tonsils  Nose:Normal external nasal appearance. Anterior rhinoscopy shows  a deviated septum preventing view posteriorly. Hypertrophy turbinates. Normal mucosa   NECK: Normal range of motion, no thyromegaly, trachea is midline, no lymphadenopathy, no neck masses, no crepitus  CHEST: Normal respiratory effort, no retractions, breathing comfortably  SKIN: No rashes, normal appearing skin, no evidence of skin lesions/tumors  Neuro:  cranial nerve II-XII intact; normal gait  Cardio:  no edema        PROCEDURE  Nasal Endoscopy (CPT code 05147)       Due to the patients chronic sinus disease and/or history of sinonasal neoplasm for surveillance a nasal endoscopy with or without debridement will be performed to complete a significant physical examination of the patient which cannot be performed by anterior rhinoscopy alone (failure of complete examination of the paranasal sinuses). Failure to provide this procedure may lead to late detection of significant chronic benign disease, acute exacerbation, resolution or failure of early diagnosis of recurrent cancer.  The procedure report is present in the body of the chart. Verbal consent was received. After topical anesthesia and decongestion had been obtained using aerosolized 1% lidocaine and oxymetazoline, a 45 degree rigid endoscope was placed into both nares with the patient in a sitting position. The following was observed:      Septum: intact and deviated   Other:   -The inferior and middle turbinates were examined. The middle meatus, and sphenoethmoid recess was examined bilaterally.    -Hypertrophy of the inferior turbinates. Erythema of the sinonasal mucosa. Some thick secretions. No evidence of polyps and purulence. Vocal cords were visualized and there is evidence of significant edema with no discrete polyps or nodules. .   -There were no complications. Tolerated well without complication. I attest that I was present for and did the entire procedure myself. This note was generated completely or in part utilizing Dragon dictation speech recognition software. Occasionally, words are mistranscribed and despite editing, the text may contain inaccuracies due to incorrect word recognition. If further clarification is needed please contact the office at [phone]. An electronic signature was used to authenticate this note.     --Cecilio Anne MD

## 2022-09-02 ENCOUNTER — PROCEDURE VISIT (OUTPATIENT)
Dept: SPEECH THERAPY | Age: 57
End: 2022-09-02
Payer: COMMERCIAL

## 2022-09-02 DIAGNOSIS — J38.3: ICD-10-CM

## 2022-09-02 DIAGNOSIS — R49.0 MUSCLE TENSION DYSPHONIA: ICD-10-CM

## 2022-09-02 DIAGNOSIS — R49.0 DYSPHONIA: Primary | ICD-10-CM

## 2022-09-02 PROCEDURE — 92520 LARYNGEAL FUNCTION STUDIES: CPT | Performed by: SPEECH-LANGUAGE PATHOLOGIST

## 2022-09-02 PROCEDURE — 92524 BEHAVRAL QUALIT ANALYS VOICE: CPT | Performed by: SPEECH-LANGUAGE PATHOLOGIST

## 2022-09-02 PROCEDURE — 92507 TX SP LANG VOICE COMM INDIV: CPT | Performed by: SPEECH-LANGUAGE PATHOLOGIST

## 2022-09-02 PROCEDURE — 31579 LARYNGOSCOPY TELESCOPIC: CPT | Performed by: SPEECH-LANGUAGE PATHOLOGIST

## 2022-09-02 NOTE — PROGRESS NOTES
[facility] ([facility]) ENT  Videostroboscopic Examination of the Larynx    BACKGROUND HISTORY:  Pt w/ hx of dysphonia; onset multiple years prior, but gradually worsening over the last few months. Characterized dysphonia as roughness and pressed phonation; experiences significant effort during speech resulting in fatigue and sensation of sore throat. 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 Unable to identify factors that improve voice; does worsen during allergy season. Uses Allegra and previously on Flonase, but started causing headaches. Completes daily sinus rinse d/t constant drainage. Consistent globus sensation w/ throat clearing. Denied dyspnea or dysphagia. Hx of GERD and takes Prevacid daily. Surgical/Medical History: See the above. Hydration: >64 oz of water  Smoking History: None  Caffeine Intake: Coffee/pop    PER ENT NOTE, Dr. Mick Montero, 8/24/22: The patient's laryngitis/dysphonia is likely related to voice overuse through her work. I would like to refer the patient to my colleague in speech and language pathology for videostrobe and voice therapy. Perceptual Quality: Pt presented with moderate-severe dysphonia characterized by roughness, breathiness, hypophonic/pressed phonation, and intermittent diplophonia. Experiencing pain on anterior larynx. Acoustic Analysis:  Maximum Sustained Phonation- 9 seconds (aphonic for 4)  Avg Hz- 208  Max Hz- 417  Min Hz- 189    Flexible Stroboscopy Laryngoscopy  Procedure : Flexible Stroboscopy Laryngoscopy  Performed by: LION Irene  Anesthesia: Afrin with 4% lidocaine  Description:  The scope was passed along the floor of the right naris to the level of the larynx. There was no evidence of concerning masses or lesions of the base of tongue, vallecula, epiglottis, aryepiglottic folds, arytenoids, false vocal folds, true vocal folds, or pyriform sinuses. The scope was removed. The patient tolerated the procedure without difficulty.  There were no complications. Pertinent findings: See Assessment and Plan of Care       Initial view of larynx              Adduction; spindle shaped     Adduction; supraglottic compression            Abduction    Abduction; bilateral sulci vs scarring? ASSESSMENT AND PLAN OF CARE:   55 y.o. female w/ hx of dysphonia; initial onset multiple years prior, but worsening over the last few months. VLS revealed bilateral questionable sulci vs scarring on medial-superior surface of TVFs; mild erythema of anterior commissure. Glottic closure characterized in spindle shaped w/ moderately reduced superior-inferior wave d/t decreased airflow. Persistent supraglottic compression of AE folds (L>R) (LM>AP) during both phonation and respiration; occasional instances of AE fold touch closure resulting in false fold phonation. Mild-moderate interarytenoid pachydermia and posterior edema, erythema and thick mucus were observed, indicative of laryngopharyngeal reflux. PND w/ frothy secretions. Subglottis was patent without evidence of narrowing. No mass lesions, paresis or paralysis was noted. EDUCATION AND THERAPY:  Reviewed VLS w/ pt; educated to presence of questionable sulci vs scarring but no known risk factors w/ exception of x1 back surgery in 2017 when intubation most likely used; possibly d/t consistent cough/throat clear. Discussed functional misuse of system, resulting in significant supraglottic tension and incomplete closure, resulting in dysphonia. Introduced to [employer] to begin coordinating respiration/phonation, reduce laryngeal tension, and promote increased vibratory wave of TVFs for decreased stiffness/scarring; required min-mod cues for focus on relaxation, as observed to have cervical tremoring d/t pushing. Resolved after ongoing trials, and able to achieve adequate forward resonance w/ excellent \"buzz\" on the lips and good vocal clarity.  Will continue to target tension-releasing techniques at next session. PATIENT GOALS:  Pt will adhere to vocal hygiene protocol during daily life activities w/ 80% acc given mod cues  Pt will adhere to reflux management protocol during daily life activities w/ 80% acc given mod cues  Pt will perform SOVT techniques during various voicing tasks w/ 80% acc given mod cues  Pt will perform RVT techniques during various voicing tasks w/ 80% acc given mod cues  Pt will perform resistive/abdominal breathing exercises at rest and during phonation w/ 80% acc given mod cues  Pt will complete laryngeal/general relaxation stretches/exercises w/ 80% acc given mod cues    SLP recommended: Yes  Barriers to treatment: None  Potential benefits from rehab include: Improved vocal quality  Frequency: 4 sessions over 6-8/wk  Prognosis is: Good    RECOMMENDATIONS:   Dr. Agnes Winslow was present and reviewed recorded evaluation to assist in diagnosis and provide assessment and plan for treatment. Follow good vocal hygiene behaviors and precautions, including increasing oral hydration. Follow dietary precautions and behavioral lifestyle changes regarding laryngopharyngeal reflux, including taking PPI as prescribed by physician. Voice therapy to focus on re-strengthening and balancing the laryngeal musculature, to promote to open oral front focus, to promote using adequate diaphragmatic breath support to sustain conversational speech. Pt is a good candidate for further medical evaluation/intervention at the discretion of the treating physician. Pt to follow up with ENT/Dr. Agnes Winslow.       CPT Code Units Billed Time Billed Today Date of POC Start Re-Certification Date Referring Provider   19037, 77535, 50994, 31964 4 Unit Time in: 1500  Time out: 1746  Total time: 45 min 9/2/2022 60 days Dr. Agnes Winslow       Thank you,    Virgilio Gotti[address] [place]; [id number]  Voice Specialized Speech-Language Pathologist

## 2022-09-07 RX ORDER — TOPIRAMATE 25 MG/1
25 TABLET ORAL NIGHTLY
Qty: 90 TABLET | Refills: 0 | Status: SHIPPED | OUTPATIENT
Start: 2022-09-07

## 2022-09-07 NOTE — TELEPHONE ENCOUNTER
Future Appointments    Encounter Information    Provider Department Appt Notes   9/26/2022 Misael Alvarado, [facility] Primary Care former [place] pt   10/5/2022 Jorden Lilly MD [facility] ENT f/u voice after seeing Montana Foster     Past Visits    Date Provider Specialty Visit Type Primary Dx   09/02/2022 Daniella Ro, SLP Speech Therapy Procedure visit Dysphonia [R49.0 (ICD-10-CM)]   08/24/2022 Jorden Lilly MD Otolaryngology Office Visit Nasal congestion   08/01/2022 Ivonne Sigala MD Primary Care Office Visit Nasal congestion   06/23/2022 Ivonne Sigala MD Primary Care Office Visit Pre-syncope   04/07/2022 Ivonne Sigala MD Primary Care Office Visit Breast cancer screening by mammogram

## 2022-09-26 ENCOUNTER — OFFICE VISIT (OUTPATIENT)
Dept: PRIMARY CARE CLINIC | Age: 57
End: 2022-09-26
Payer: COMMERCIAL

## 2022-09-26 VITALS
SYSTOLIC BLOOD PRESSURE: 107 MMHG | HEIGHT: 66 IN | WEIGHT: 167 LBS | BODY MASS INDEX: 26.84 KG/M2 | TEMPERATURE: 97.3 F | OXYGEN SATURATION: 93 % | DIASTOLIC BLOOD PRESSURE: 68 MMHG | HEART RATE: 68 BPM

## 2022-09-26 DIAGNOSIS — R06.09 DOE (DYSPNEA ON EXERTION): ICD-10-CM

## 2022-09-26 DIAGNOSIS — R07.81 PLEURITIC CHEST PAIN: ICD-10-CM

## 2022-09-26 DIAGNOSIS — Z12.11 COLON CANCER SCREENING: ICD-10-CM

## 2022-09-26 DIAGNOSIS — M54.16 LUMBAR RADICULOPATHY: ICD-10-CM

## 2022-09-26 DIAGNOSIS — R49.0 CHRONIC HOARSENESS: ICD-10-CM

## 2022-09-26 DIAGNOSIS — G43.009 MIGRAINE WITHOUT AURA AND WITHOUT STATUS MIGRAINOSUS, NOT INTRACTABLE: ICD-10-CM

## 2022-09-26 DIAGNOSIS — J30.89 OTHER ALLERGIC RHINITIS: Primary | ICD-10-CM

## 2022-09-26 DIAGNOSIS — E78.2 MIXED HYPERLIPIDEMIA: ICD-10-CM

## 2022-09-26 DIAGNOSIS — J45.40 MODERATE PERSISTENT ASTHMA WITHOUT COMPLICATION: ICD-10-CM

## 2022-09-26 PROCEDURE — 99214 OFFICE O/P EST MOD 30 MIN: CPT | Performed by: INTERNAL MEDICINE

## 2022-09-26 RX ORDER — CETIRIZINE HYDROCHLORIDE 10 MG/1
10 TABLET ORAL DAILY
Qty: 30 TABLET | Refills: 5 | Status: SHIPPED | OUTPATIENT
Start: 2022-09-26

## 2022-09-26 RX ORDER — RIMEGEPANT SULFATE 75 MG/75MG
75 TABLET, ORALLY DISINTEGRATING ORAL DAILY PRN
Qty: 30 TABLET | Refills: 2 | Status: SHIPPED | OUTPATIENT
Start: 2022-09-26 | End: 2022-10-25 | Stop reason: SDUPTHER

## 2022-09-26 RX ORDER — AZITHROMYCIN 1 G
1 PACKET (EA) ORAL ONCE
COMMUNITY
End: 2022-10-25

## 2022-09-26 ASSESSMENT — ENCOUNTER SYMPTOMS
EYE PAIN: 0
RHINORRHEA: 0
PHOTOPHOBIA: 0
ABDOMINAL PAIN: 0
SORE THROAT: 0
COUGH: 1
NAUSEA: 0
CONSTIPATION: 0
BLOOD IN STOOL: 0
ALLERGIC/IMMUNOLOGIC NEGATIVE: 1
SWOLLEN GLANDS: 0
BACK PAIN: 1
WHEEZING: 1
SPUTUM PRODUCTION: 0
SHORTNESS OF BREATH: 1
RECTAL PAIN: 0
EYE REDNESS: 0
ANAL BLEEDING: 0
SINUS PRESSURE: 0
ABDOMINAL DISTENTION: 0
VOMITING: 0
ORTHOPNEA: 0

## 2022-09-26 NOTE — PROGRESS NOTES
Differential; Future  8. Pleuritic chest pain, pulse ox 93 %   room air with no tachypnea or tachycardia. Low for her age and status. Will further evaluate with CTA with pleuritic chest pain a week ago that resolved. -     CTA CHEST W CONTRAST; Future  9. Migraine without aura and without status migrainosus, not intractable  -     Rimegepant Sulfate (NURTEC) 75 MG TBDP; Take 75 mg by mouth daily as needed (migraine), Disp-30 tablet, R-2Normal      Return in about 6 weeks (around 11/7/2022). Subjective   SUBJECTIVE/OBJECTIVE:  Date: 06/17/2019  Exam Description: MR Lumbar Spine w/wo Contrast         HISTORY:  Lumbar surgery November 2017. Numbness and pain to right low back for several   months. TECHNICAL FACTORS:  Long- and short-axis fat- and water-weighted images were obtained before   and after contrast administration; 1.5T [place] Oval. Contrast Type: Dotarem 0.5mmol/ml    10mL prefilled NDC [id number]. COMPARISON:  None. FINDINGS:  Assume 5 lumbar vertebrae. Lordosis is moderate-to-accentuated. Conus medullaris   termination is normal.     L1-2: Trace disc bulge. L2-3: Trace disc bulge. Facet arthropathy, scant facet fluid. L3-4: Trace disc bulge straightens ventral dural sac. Mild facet arthropathy and hypertrophy. Mild spinal stenosis. L4-5: Left laminectomy and facetectomy;, interbody fusion, posterolateral fusion; interbody   spacer; transpedicle screws and rods. 2mm anterolisthesis; listhesed disc contacting foraminal   right L4 nerve root. Obscuration of anterior extradural/left L4 perineural fat occurs per   precontrast images; enhances postcontrast.     L5-S1: Left laminectomy, interbody fusion, posterolateral fusion; interbody spacer and   transpedicle screws. 2mm anterolisthesis. Listhesis disc eccentric right; very gently   encroaches upon right ventral dural sac and right S1 nerve root.   Obscuration of anterior   extradural and right L5 perineural fat per precontrast images enhances postcontrast.     10mm hyperintense T2/STIR/isointense T1 ellipsoid site at T12 vertebral body; enhances   postcontrast.                       Sinusitis  This is a recurrent (went to urgent care and with purulent nasal drainage and sinus pain  and given zpack and medrol dose pack and tordal.) problem. The current episode started in the past 7 days. The problem has been gradually improving since onset. There has been no fever. Associated symptoms include congestion, coughing, headaches and shortness of breath. Pertinent negatives include no chills, diaphoresis, neck pain, sinus pressure, sore throat or swollen glands. Treatments tried: zpack, prednisone. The treatment provided moderate relief. Shortness of Breath  This is a new (no tachypnea, and not short of breath at rest but pulse ox was only 93% on room air.) problem. The problem has been waxing and waning. The average episode lasts 3 minutes. Associated symptoms include headaches, leg pain and wheezing. Pertinent negatives include no abdominal pain, chest pain, claudication, fever, leg swelling, neck pain, orthopnea, PND, rash, rhinorrhea, sore throat, sputum production, swollen glands, syncope or vomiting. Associated symptoms comments: One weeks ago sharp pleuritic chest pain that resolved. Chronic intermittent leg pain with sciatica. . The patient has no known risk factors for DVT/PE. She has tried nothing (history of asthm but not on inhaler at present. wheezing intermittently, recent negative covid 19 test.) for the symptoms. Her past medical history is significant for asthma. There is no history of CAD, COPD, DVT, a heart failure, PE or a recent surgery. Review of Systems   Constitutional:  Negative for chills, diaphoresis, fatigue and fever. HENT:  Positive for congestion and postnasal drip. Negative for rhinorrhea, sinus pressure and sore throat. Eyes:  Negative for photophobia, pain and redness.    Respiratory: Positive for cough, shortness of breath and wheezing. Negative for sputum production. Cardiovascular: Negative. Negative for chest pain, orthopnea, claudication, leg swelling, syncope and PND. Gastrointestinal:  Negative for abdominal distention, abdominal pain, anal bleeding, blood in stool, constipation, nausea, rectal pain and vomiting. Endocrine: Negative. Genitourinary: Negative. Negative for dysuria and pelvic pain. Musculoskeletal:  Positive for back pain. Negative for joint swelling, myalgias and neck pain. Low back pain improved with Lyrica 25 mg twice a day and fibromyalgia controlled with Topamax 25 mg daily. Skin: Negative. Negative for rash. Finger nail thickening. Allergic/Immunologic: Negative. Neurological:  Positive for headaches. Negative for seizures, weakness and numbness. Lumbar laminectomy November of 2017 at [facility] with resolution of spinal claudication. Improved with epidural   Hematological: Negative. Psychiatric/Behavioral: Negative. Objective   Physical Exam  Constitutional:       General: She is not in acute distress. Appearance: She is well-developed. She is not diaphoretic. HENT:      Head: Normocephalic and atraumatic. Right Ear: Tympanic membrane and external ear normal.      Left Ear: Tympanic membrane and external ear normal.      Nose: Nose normal.   Eyes:      General: No scleral icterus. Right eye: No discharge. Left eye: No discharge. Conjunctiva/sclera: Conjunctivae normal.      Pupils: Pupils are equal, round, and reactive to light. Neck:      Thyroid: No thyromegaly. Vascular: No JVD. Trachea: No tracheal deviation. Cardiovascular:      Rate and Rhythm: Normal rate. Heart sounds: Normal heart sounds. No murmur heard. No gallop. Pulmonary:      Effort: Pulmonary effort is normal. No respiratory distress. Breath sounds: Normal breath sounds. No wheezing or rales. Chest:      Chest wall: No tenderness. Abdominal:      General: Bowel sounds are normal. There is no distension. Palpations: Abdomen is soft. There is no mass. Tenderness: There is no abdominal tenderness. There is no right CVA tenderness, left CVA tenderness, guarding or rebound. Musculoskeletal:         General: No tenderness or deformity. Cervical back: Normal range of motion and neck supple. Right lower leg: No edema. Left lower leg: No edema. Lymphadenopathy:      Cervical: No cervical adenopathy. Skin:     General: Skin is warm and dry. Coloration: Skin is not pale. Findings: No erythema or rash. Comments: Find papillary ration hyperpigmentation posterior neck consistent with atopic dermatitis   Neurological:      Mental Status: She is alert and oriented to person, place, and time. Cranial Nerves: No cranial nerve deficit. Motor: No weakness or abnormal muscle tone. Coordination: Coordination normal.      Gait: Gait normal.      Deep Tendon Reflexes: Reflexes normal.      Comments: Decreased knee jerks , kurtis biceps and triceps reflexes. Reduced spasm in lower back. Reduced range of motion of lumbar spine. Psychiatric:         Mood and Affect: Mood normal.         Behavior: Behavior normal.         Thought Content: Thought content normal.         Judgment: Judgment normal.                An electronic signature was used to authenticate this note.     --Cedrick Guerra MD

## 2022-09-27 DIAGNOSIS — J45.20 MILD INTERMITTENT ASTHMA WITHOUT COMPLICATION: Primary | ICD-10-CM

## 2022-09-27 RX ORDER — ALBUTEROL SULFATE 90 UG/1
2 AEROSOL, METERED RESPIRATORY (INHALATION) EVERY 6 HOURS PRN
Qty: 18 G | Refills: 5 | Status: SHIPPED | OUTPATIENT
Start: 2022-09-27

## 2022-10-01 DIAGNOSIS — M48.061 SPINAL STENOSIS OF LUMBAR REGION WITHOUT NEUROGENIC CLAUDICATION: ICD-10-CM

## 2022-10-01 DIAGNOSIS — R06.09 DOE (DYSPNEA ON EXERTION): ICD-10-CM

## 2022-10-03 DIAGNOSIS — R06.02 SHORTNESS OF BREATH: Primary | ICD-10-CM

## 2022-10-03 LAB
REASON FOR REJECTION: NORMAL
REJECTED TEST: NORMAL

## 2022-10-03 RX ORDER — BACLOFEN 5 MG/1
TABLET ORAL
Qty: 90 TABLET | Refills: 0 | Status: SHIPPED | OUTPATIENT
Start: 2022-10-03

## 2022-10-03 NOTE — TELEPHONE ENCOUNTER
garry Appointments    Encounter Information    Provider Department Appt Notes   10/5/2022 Rachael Holter, MD [facility] E[facility] f/u voice after seeing Rowan Kennedy     Past Visits    Date Provider Specialty Visit Type Primary Dx   09/26/2022 Ana Ruiz MD Primary Care Office Visit Other allergic rhinitis

## 2022-10-10 ENCOUNTER — HOSPITAL ENCOUNTER (OUTPATIENT)
Age: 57
Discharge: HOME | End: 2022-10-10
Payer: COMMERCIAL

## 2022-10-10 ENCOUNTER — HOSPITAL ENCOUNTER (OUTPATIENT)
Dept: GENERAL RADIOLOGY | Age: 57
Discharge: HOME | End: 2022-10-10
Payer: COMMERCIAL

## 2022-10-10 DIAGNOSIS — M54.16 LUMBAR RADICULOPATHY: ICD-10-CM

## 2022-10-10 PROCEDURE — 72110 X-RAY EXAM L-2 SPINE 4/>VWS: CPT

## 2022-10-21 DIAGNOSIS — R06.02 SHORTNESS OF BREATH: ICD-10-CM

## 2022-10-21 LAB
ALBUMIN SERPL-MCNC: 4.8 G/DL (ref 3.4–5)
ANION GAP SERPL CALCULATED.3IONS-SCNC: 11 MMOL/L (ref 3–16)
BASOPHILS ABSOLUTE: 0 K/UL (ref 0–0.2)
BASOPHILS RELATIVE PERCENT: 0.6 %
BUN BLDV-MCNC: 15 MG/DL (ref 7–20)
CALCIUM SERPL-MCNC: 9.3 MG/DL (ref 8.3–10.6)
CHLORIDE BLD-SCNC: 106 MMOL/L (ref 99–110)
CO2: 25 MMOL/L (ref 21–32)
CREAT SERPL-MCNC: 0.7 MG/DL (ref 0.6–1.1)
D DIMER: <0.27 UG/ML FEU (ref 0–0.6)
EOSINOPHILS ABSOLUTE: 0.1 K/UL (ref 0–0.6)
EOSINOPHILS RELATIVE PERCENT: 2 %
GFR SERPL CREATININE-BSD FRML MDRD: >60 ML/MIN/{1.73_M2}
GLUCOSE BLD-MCNC: 79 MG/DL (ref 70–99)
HCT VFR BLD CALC: 41.9 % (ref 36–48)
HEMOGLOBIN: 14.1 G/DL (ref 12–16)
LYMPHOCYTES ABSOLUTE: 1.1 K/UL (ref 1–5.1)
LYMPHOCYTES RELATIVE PERCENT: 38.8 %
MCH RBC QN AUTO: 31.4 PG (ref 26–34)
MCHC RBC AUTO-ENTMCNC: 33.6 G/DL (ref 31–36)
MCV RBC AUTO: 93.4 FL (ref 80–100)
MONOCYTES ABSOLUTE: 0.3 K/UL (ref 0–1.3)
MONOCYTES RELATIVE PERCENT: 9.1 %
NEUTROPHILS ABSOLUTE: 1.4 K/UL (ref 1.7–7.7)
NEUTROPHILS RELATIVE PERCENT: 49.5 %
PDW BLD-RTO: 13.7 % (ref 12.4–15.4)
PHOSPHORUS: 2.5 MG/DL (ref 2.5–4.9)
PLATELET # BLD: 225 K/UL (ref 135–450)
PMV BLD AUTO: 7.3 FL (ref 5–10.5)
POTASSIUM SERPL-SCNC: 4.2 MMOL/L (ref 3.5–5.1)
RBC # BLD: 4.49 M/UL (ref 4–5.2)
SODIUM BLD-SCNC: 142 MMOL/L (ref 136–145)
WBC # BLD: 2.8 K/UL (ref 4–11)

## 2022-10-21 NOTE — RESULT ENCOUNTER NOTE
I am happy to report that the kidney blood tests were normal.  The blood sugar and potassium levels were normal.  [employer] message sent.

## 2022-10-25 DIAGNOSIS — G43.009 MIGRAINE WITHOUT AURA AND WITHOUT STATUS MIGRAINOSUS, NOT INTRACTABLE: ICD-10-CM

## 2022-10-25 DIAGNOSIS — M48.061 NEURAL FORAMINAL STENOSIS OF LUMBAR SPINE: Primary | ICD-10-CM

## 2022-10-25 DIAGNOSIS — K58.1 IRRITABLE BOWEL SYNDROME WITH CONSTIPATION: ICD-10-CM

## 2022-10-25 RX ORDER — METHYLPREDNISOLONE 4 MG/1
TABLET ORAL
Qty: 1 KIT | Refills: 0 | Status: SHIPPED | OUTPATIENT
Start: 2022-10-25 | End: 2022-10-31

## 2022-10-25 RX ORDER — RIMEGEPANT SULFATE 75 MG/75MG
75 TABLET, ORALLY DISINTEGRATING ORAL DAILY PRN
Qty: 30 TABLET | Refills: 2 | Status: SHIPPED | OUTPATIENT
Start: 2022-10-25

## 2022-10-31 ENCOUNTER — TELEPHONE (OUTPATIENT)
Dept: ADMINISTRATIVE | Age: 57
End: 2022-10-31

## 2022-10-31 NOTE — TELEPHONE ENCOUNTER
Submitted PA for Nurtec 75MG dispersible tablets. Key: [id number]  Via CMM STATUS: APPROVED 10/03/2022 through 11/02/2023. Letter attached. If this requires a response please respond to the pool ( [facility] [address]). Thank you please advise patient.

## 2022-11-21 DIAGNOSIS — M54.16 LUMBAR RADICULOPATHY: ICD-10-CM

## 2022-11-21 NOTE — TELEPHONE ENCOUNTER
Medication:   Requested Prescriptions     Pending Prescriptions Disp Refills    pregabalin (LYRICA) 25 MG capsule 60 capsule 5     Sig: Take 1 capsule by mouth twice daily        Last Filled:      Patient Phone Number: [phone] (home)     Last appt: 9/26/2022   Next appt: Visit date not found    Last OARRS: No flowsheet data found.

## 2022-11-23 RX ORDER — PREGABALIN 25 MG/1
CAPSULE ORAL
Qty: 60 CAPSULE | Refills: 5 | Status: SHIPPED | OUTPATIENT
Start: 2022-11-23 | End: 2023-05-12

## 2022-12-06 DIAGNOSIS — M48.061 SPINAL STENOSIS OF LUMBAR REGION WITHOUT NEUROGENIC CLAUDICATION: ICD-10-CM

## 2022-12-06 RX ORDER — BACLOFEN 5 MG/1
TABLET ORAL
Qty: 90 TABLET | Refills: 0 | Status: SHIPPED | OUTPATIENT
Start: 2022-12-06

## 2022-12-06 NOTE — TELEPHONE ENCOUNTER
Future Appointments    This patient does not currently have any appointments scheduled.   Past Visits    Date Provider Specialty Visit Type Primary Dx   09/26/2022 Ruth Sawyer MD Primary Care Office Visit Other allergic rhinitis

## 2022-12-29 RX ORDER — TOPIRAMATE 25 MG/1
25 TABLET ORAL NIGHTLY
Qty: 90 TABLET | Refills: 0 | Status: SHIPPED | OUTPATIENT
Start: 2022-12-29

## 2023-01-09 ENCOUNTER — OFFICE VISIT (OUTPATIENT)
Dept: PRIMARY CARE CLINIC | Age: 58
End: 2023-01-09
Payer: COMMERCIAL

## 2023-01-09 VITALS
OXYGEN SATURATION: 98 % | HEIGHT: 66 IN | TEMPERATURE: 97.8 F | BODY MASS INDEX: 28.57 KG/M2 | HEART RATE: 84 BPM | WEIGHT: 177.8 LBS | DIASTOLIC BLOOD PRESSURE: 54 MMHG | SYSTOLIC BLOOD PRESSURE: 147 MMHG

## 2023-01-09 DIAGNOSIS — M54.31 SCIATICA, RIGHT SIDE: Primary | ICD-10-CM

## 2023-01-09 DIAGNOSIS — R73.03 PREDIABETES: ICD-10-CM

## 2023-01-09 DIAGNOSIS — M54.16 LUMBAR RADICULOPATHY: ICD-10-CM

## 2023-01-09 DIAGNOSIS — M48.061 SPINAL STENOSIS OF LUMBAR REGION WITHOUT NEUROGENIC CLAUDICATION: ICD-10-CM

## 2023-01-09 PROCEDURE — 99214 OFFICE O/P EST MOD 30 MIN: CPT | Performed by: INTERNAL MEDICINE

## 2023-01-09 RX ORDER — TOPIRAMATE 50 MG/1
25 TABLET, FILM COATED ORAL NIGHTLY
Qty: 90 TABLET | Refills: 3 | Status: SHIPPED | OUTPATIENT
Start: 2023-01-09

## 2023-01-09 RX ORDER — METHYLPREDNISOLONE 4 MG/1
TABLET ORAL
Qty: 1 KIT | Refills: 1 | Status: SHIPPED | OUTPATIENT
Start: 2023-01-09 | End: 2023-01-15

## 2023-01-09 RX ORDER — BUPROPION HYDROCHLORIDE 200 MG/1
TABLET, EXTENDED RELEASE ORAL
Qty: 60 TABLET | Refills: 3 | Status: SHIPPED | OUTPATIENT
Start: 2023-01-09

## 2023-01-09 RX ORDER — NALTREXONE HYDROCHLORIDE 50 MG/1
TABLET, FILM COATED ORAL
Qty: 30 TABLET | Refills: 1 | Status: SHIPPED | OUTPATIENT
Start: 2023-01-09

## 2023-01-09 RX ORDER — PREGABALIN 25 MG/1
CAPSULE ORAL
Qty: 60 CAPSULE | Refills: 5 | Status: SHIPPED | OUTPATIENT
Start: 2023-01-09 | End: 2023-06-28

## 2023-01-09 ASSESSMENT — PATIENT HEALTH QUESTIONNAIRE - PHQ9
SUM OF ALL RESPONSES TO PHQ QUESTIONS 1-9: 0
2. FEELING DOWN, DEPRESSED OR HOPELESS: 0
SUM OF ALL RESPONSES TO PHQ QUESTIONS 1-9: 0
SUM OF ALL RESPONSES TO PHQ9 QUESTIONS 1 & 2: 0
1. LITTLE INTEREST OR PLEASURE IN DOING THINGS: 0
SUM OF ALL RESPONSES TO PHQ QUESTIONS 1-9: 0
SUM OF ALL RESPONSES TO PHQ QUESTIONS 1-9: 0

## 2023-01-09 ASSESSMENT — ENCOUNTER SYMPTOMS
NAUSEA: 0
ABDOMINAL PAIN: 0
SWOLLEN GLANDS: 0
VISUAL CHANGE: 0
COUGH: 0
VOMITING: 0
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
## 2023-01-11 ASSESSMENT — ENCOUNTER SYMPTOMS
BLOOD IN STOOL: 0
RHINORRHEA: 0
RECTAL PAIN: 0
ALLERGIC/IMMUNOLOGIC NEGATIVE: 1
ANAL BLEEDING: 0
SORE THROAT: 0
PHOTOPHOBIA: 0
CONSTIPATION: 0
EYE PAIN: 0
BACK PAIN: 1
ABDOMINAL DISTENTION: 0
EYE REDNESS: 0
SINUS PRESSURE: 0

## 2023-01-16 ENCOUNTER — HOSPITAL ENCOUNTER (OUTPATIENT)
Dept: GENERAL RADIOLOGY | Age: 58
Discharge: HOME | End: 2023-01-16
Payer: COMMERCIAL

## 2023-01-16 ENCOUNTER — HOSPITAL ENCOUNTER (OUTPATIENT)
Age: 58
Discharge: HOME | End: 2023-01-16
Payer: COMMERCIAL

## 2023-01-16 DIAGNOSIS — M54.31 SCIATICA, RIGHT SIDE: ICD-10-CM

## 2023-01-16 DIAGNOSIS — R73.03 PREDIABETES: ICD-10-CM

## 2023-01-16 LAB
A/G RATIO: 2.4 (ref 1.1–2.2)
ALBUMIN SERPL-MCNC: 4.7 G/DL (ref 3.4–5)
ALP BLD-CCNC: 51 U/L (ref 40–129)
ALT SERPL-CCNC: 19 U/L (ref 10–40)
ANION GAP SERPL CALCULATED.3IONS-SCNC: 10 MMOL/L (ref 3–16)
AST SERPL-CCNC: 15 U/L (ref 15–37)
BASOPHILS ABSOLUTE: 0 K/UL (ref 0–0.2)
BASOPHILS RELATIVE PERCENT: 0.5 %
BILIRUB SERPL-MCNC: 1 MG/DL (ref 0–1)
BILIRUBIN URINE: NEGATIVE
BLOOD, URINE: NEGATIVE
BUN BLDV-MCNC: 8 MG/DL (ref 7–20)
CALCIUM SERPL-MCNC: 9.5 MG/DL (ref 8.3–10.6)
CHLORIDE BLD-SCNC: 104 MMOL/L (ref 99–110)
CLARITY: CLEAR
CO2: 25 MMOL/L (ref 21–32)
COLOR: YELLOW
COMMENT UA: NORMAL
CREAT SERPL-MCNC: 0.6 MG/DL (ref 0.6–1.1)
EOSINOPHILS ABSOLUTE: 0 K/UL (ref 0–0.6)
EOSINOPHILS RELATIVE PERCENT: 0.2 %
GFR SERPL CREATININE-BSD FRML MDRD: >60 ML/MIN/{1.73_M2}
GLUCOSE BLD-MCNC: 83 MG/DL (ref 70–99)
GLUCOSE URINE: NEGATIVE MG/DL
HCT VFR BLD CALC: 43 % (ref 36–48)
HEMOGLOBIN: 14 G/DL (ref 12–16)
KETONES, URINE: NEGATIVE MG/DL
LEUKOCYTE ESTERASE, URINE: NEGATIVE
LYMPHOCYTES ABSOLUTE: 1.8 K/UL (ref 1–5.1)
LYMPHOCYTES RELATIVE PERCENT: 29.8 %
MCH RBC QN AUTO: 31 PG (ref 26–34)
MCHC RBC AUTO-ENTMCNC: 32.7 G/DL (ref 31–36)
MCV RBC AUTO: 94.8 FL (ref 80–100)
MICROSCOPIC EXAMINATION: NORMAL
MONOCYTES ABSOLUTE: 0.4 K/UL (ref 0–1.3)
MONOCYTES RELATIVE PERCENT: 7.6 %
NEUTROPHILS ABSOLUTE: 3.7 K/UL (ref 1.7–7.7)
NEUTROPHILS RELATIVE PERCENT: 61.9 %
NITRITE, URINE: NEGATIVE
PDW BLD-RTO: 13.7 % (ref 12.4–15.4)
PH UA: 6.5 (ref 5–8)
PLATELET # BLD: 260 K/UL (ref 135–450)
PMV BLD AUTO: 8.1 FL (ref 5–10.5)
POTASSIUM SERPL-SCNC: 4 MMOL/L (ref 3.5–5.1)
PROTEIN UA: NEGATIVE MG/DL
RBC # BLD: 4.53 M/UL (ref 4–5.2)
RBC UA: NORMAL /HPF (ref 0–4)
SODIUM BLD-SCNC: 139 MMOL/L (ref 136–145)
SPECIFIC GRAVITY UA: 1.01 (ref 1–1.03)
TOTAL PROTEIN: 6.7 G/DL (ref 6.4–8.2)
URINE TYPE: NORMAL
UROBILINOGEN, URINE: 0.2 E.U./DL
WBC # BLD: 5.9 K/UL (ref 4–11)
WBC UA: NORMAL /HPF (ref 0–5)

## 2023-01-16 PROCEDURE — 72100 X-RAY EXAM L-S SPINE 2/3 VWS: CPT

## 2023-01-17 LAB
ESTIMATED AVERAGE GLUCOSE: 111.2 MG/DL
HBA1C MFR BLD: 5.5 %

## 2023-01-19 NOTE — RESULT ENCOUNTER NOTE
You are looking at your results. They were all normal.  Normal urinalysis and no diabetes and no anemia and normal white blood cell count.   Normal kidney blood test.

## 2023-02-06 ENCOUNTER — OFFICE VISIT (OUTPATIENT)
Dept: PRIMARY CARE CLINIC | Age: 58
End: 2023-02-06
Payer: COMMERCIAL

## 2023-02-06 VITALS
DIASTOLIC BLOOD PRESSURE: 70 MMHG | TEMPERATURE: 97.4 F | BODY MASS INDEX: 28.25 KG/M2 | SYSTOLIC BLOOD PRESSURE: 110 MMHG | HEART RATE: 72 BPM | OXYGEN SATURATION: 98 % | WEIGHT: 175 LBS

## 2023-02-06 DIAGNOSIS — N95.1 PERI-MENOPAUSAL: ICD-10-CM

## 2023-02-06 DIAGNOSIS — E78.2 MIXED HYPERLIPIDEMIA: ICD-10-CM

## 2023-02-06 DIAGNOSIS — R51.9 SEVERE FRONTAL HEADACHES: ICD-10-CM

## 2023-02-06 DIAGNOSIS — J01.00 ACUTE MAXILLARY SINUSITIS, RECURRENCE NOT SPECIFIED: ICD-10-CM

## 2023-02-06 DIAGNOSIS — M89.8X8 MASS OF SPINE: Primary | ICD-10-CM

## 2023-02-06 DIAGNOSIS — K58.1 IRRITABLE BOWEL SYNDROME WITH CONSTIPATION: ICD-10-CM

## 2023-02-06 PROCEDURE — 99214 OFFICE O/P EST MOD 30 MIN: CPT | Performed by: INTERNAL MEDICINE

## 2023-02-06 RX ORDER — LIRAGLUTIDE 6 MG/ML
0.6 INJECTION, SOLUTION SUBCUTANEOUS DAILY
Qty: 4 ADJUSTABLE DOSE PRE-FILLED PEN SYRINGE | Refills: 5 | Status: SHIPPED | OUTPATIENT
Start: 2023-02-06 | End: 2023-02-11 | Stop reason: SDUPTHER

## 2023-02-06 RX ORDER — DOXYCYCLINE HYCLATE 100 MG
100 TABLET ORAL 2 TIMES DAILY
Qty: 20 TABLET | Refills: 0 | Status: SHIPPED | OUTPATIENT
Start: 2023-02-06 | End: 2023-02-16

## 2023-02-06 RX ORDER — BUPROPION HYDROCHLORIDE 200 MG/1
TABLET, EXTENDED RELEASE ORAL
Qty: 60 TABLET | Refills: 3 | Status: CANCELLED | OUTPATIENT
Start: 2023-02-06

## 2023-02-06 SDOH — ECONOMIC STABILITY: HOUSING INSECURITY
IN THE LAST 12 MONTHS, WAS THERE A TIME WHEN YOU DID NOT HAVE A STEADY PLACE TO SLEEP OR SLEPT IN A SHELTER (INCLUDING NOW)?: NO

## 2023-02-06 SDOH — ECONOMIC STABILITY: FOOD INSECURITY: WITHIN THE PAST 12 MONTHS, THE FOOD YOU BOUGHT JUST DIDN'T LAST AND YOU DIDN'T HAVE MONEY TO GET MORE.: NEVER TRUE

## 2023-02-06 SDOH — ECONOMIC STABILITY: INCOME INSECURITY: HOW HARD IS IT FOR YOU TO PAY FOR THE VERY BASICS LIKE FOOD, HOUSING, MEDICAL CARE, AND HEATING?: NOT HARD AT ALL

## 2023-02-06 SDOH — ECONOMIC STABILITY: FOOD INSECURITY: WITHIN THE PAST 12 MONTHS, YOU WORRIED THAT YOUR FOOD WOULD RUN OUT BEFORE YOU GOT MONEY TO BUY MORE.: NEVER TRUE

## 2023-02-06 ASSESSMENT — ENCOUNTER SYMPTOMS
SORE THROAT: 0
BLOOD IN STOOL: 0
ABDOMINAL PAIN: 0
PHOTOPHOBIA: 0
RHINORRHEA: 0
RECTAL PAIN: 0
COUGH: 0
ANAL BLEEDING: 0
NAUSEA: 0
ALLERGIC/IMMUNOLOGIC NEGATIVE: 1
EYE REDNESS: 0
SHORTNESS OF BREATH: 0
VOMITING: 0
BACK PAIN: 1
CONSTIPATION: 0
EYE PAIN: 0
ABDOMINAL DISTENTION: 0
SINUS PRESSURE: 0

## 2023-02-06 NOTE — PROGRESS NOTES
Lorne Junior (:  1965) is a 65 y.o. female,Established patient, here for evaluation of the following chief complaint(s):  Follow-up         ASSESSMENT/PLAN:  1. Mass of spine  :  will get follow up MRI with back pain. Repeat study below:  No radiation information found for this patient      FINDINGS:  Approximately 10 x 6mm smooth ovoid lesion right side of T12 vertebral body enhances    with contrast.  Appearance consistent with hemangioma. Vertebral body marrow signal otherwise    normal.    2019       A cm ellipsoid lesion at T12 vertebral body; indeterminate etiology. Will request Ms. Triana    to return for additional dataset in efforts to differentiate benign from malignant    characteristics. Addended report will be forwarded. 2019    -     MRI THORACIC SPINE W WO CONTRAST; Future  2. Amber-menopausal : history of hysterectomy, symptoms controlled with estrotest, will refill.  -     estrogens, conjugated,-methylTESTOSTERone (EST ESTROGENS-METHYLTEST HS) 0.625-1.25 MG per tablet; Take 1 tablet by mouth daily. , Disp-90 tablet, R-1Patient doing a slow cross over from 1.25-2.5 EE-MT to 0.625-1.25, by substituting a single low dose tablet starting 1 day a week, increasing by 1 day/week every 2 weeks, achieving conversion in 3 mosNormal  3. Irritable bowel syndrome with constipation: controlled with linzess, will refill.  -     linaclotide (LINZESS) 290 MCG CAPS capsule; Take 1 capsule by mouth every morning (before breakfast), Disp-30 capsule, R-5Normal  4. Severe frontal headaches, new symptoms. Blood pressure normal.  Different from migraine headaches. . constant pressure  -     MRI BRAIN WO CONTRAST; Future  5. Acute maxillary sinusitis, recurrence not specified. Continue zyrtec and saline nasal spray.  -     doxycycline hyclate (VIBRA-TABS) 100 MG tablet; Take 1 tablet by mouth 2 times daily for 10 days, Disp-20 tablet, R-0Normal  6.  Adult BMI 28.0-28.9 kg/sq with co- morbid conditions of degenerative disc disease with back pain and hyperlipidemia. Will need to PA Saxenda. -     liraglutide-weight management (SAXENDA) 18 MG/3ML SOPN; Inject 0.6 mg into the skin daily, Disp-4 Adjustable Dose Pre-filled Pen Syringe, R-5Normal  7. Mixed hyperlipidemia  waxing and waning on diet. Work on diet and exercise. Lab Results   Component Value Date    CHOL 178 04/18/2022    CHOL 212 (H) 10/14/2020    CHOL 234 (H) 09/20/2017     Lab Results   Component Value Date    TRIG 50 04/18/2022    TRIG 63 10/14/2020    TRIG 66 09/20/2017     Lab Results   Component Value Date    HDL 83 (H) 04/18/2022    HDL 77 (H) 10/14/2020     (H) 09/20/2017     Lab Results   Component Value Date    LDLCALC 85 04/18/2022    LDLCALC 122 (H) 10/14/2020    LDLCALC 104 (H) 09/20/2017     Lab Results   Component Value Date    LABVLDL 10 04/18/2022    LABVLDL 13 10/14/2020    LABVLDL 13 09/20/2017     No results found for: CHOLHDLRATIO   -     liraglutide-weight management (SAXENDA) 18 MG/3ML SOPN; Inject 0.6 mg into the skin daily, Disp-4 Adjustable Dose Pre-filled Pen Syringe, R-5Normal      Return in about 4 weeks (around 3/6/2023). Subjective   SUBJECTIVE/OBJECTIVE:  Headache  Headache pattern: HEADACHE ALL THE TIME FOR ONE WEEK. Providers seen:  None  Age of onset (years):  65 (HISTORY OF MIGRAINE BUT THIS HEADACHE STARTED ONE WEEK AGO.)  Longest time without a headache:  Days  Number of ER visits for headache:  7  Did ER treatment alleviate headache symptoms?:  No  Did hospitalization alleviate headache symptoms?:  No  Frequency of headaches impacting ADL: PATIENT WENT TO WORK BUT IT WAS HARD.   Time of day symptoms are worse:  Upon waking up (ALL DAY)  Do headaches wake patient from sleep?: No    Days of the week symptoms are worse:  No specific day of the week (HEADACHES MAKE IT HARD TO DO HOMEWORK)  Quality:  Pressure pushing out (NOT POUNDING LIKE MIGRAINES.)  Location:  Around eyes, behind eyes and front/forehead  Pain severity:  8  Duration:  7 days  Headaches last more than three days?: Yes    Aggravating factors: MOVING AND WALKING AGGRAVATE HEADACHE. Hyperlipidemia  This is a chronic problem. The current episode started more than 1 year ago. The problem is uncontrolled. Recent lipid tests were reviewed and are normal. She has no history of chronic renal disease, diabetes, hypothyroidism, liver disease, obesity or nephrotic syndrome. There are no known factors aggravating her hyperlipidemia. Pertinent negatives include no chest pain, focal sensory loss, focal weakness, leg pain, myalgias or shortness of breath. Current antihyperlipidemic treatment includes diet change and exercise. The current treatment provides moderate improvement of lipids. Compliance problems include adherence to diet and adherence to exercise. Risk factors for coronary artery disease include post-menopausal.     Review of Systems   Constitutional:  Negative for chills, diaphoresis, fatigue and fever. HENT:  Negative for congestion, postnasal drip, rhinorrhea, sinus pressure and sore throat. Eyes:  Negative for photophobia, pain and redness. Respiratory:  Negative for cough and shortness of breath. Cardiovascular: Negative. Negative for chest pain and leg swelling. Gastrointestinal:  Negative for abdominal distention, abdominal pain, anal bleeding, blood in stool, constipation, nausea, rectal pain and vomiting. Endocrine: Negative. Genitourinary: Negative. Negative for dysuria and pelvic pain. Musculoskeletal:  Positive for back pain. Negative for arthralgias, joint swelling, myalgias and neck pain. Low back pain improved with Lyrica 25 mg twice a day and fibromyalgia controlled with Topamax 25 mg daily. Skin: Negative. Negative for rash. Allergic/Immunologic: Negative. Neurological:  Positive for headaches. Negative for focal weakness, seizures, weakness and numbness.         Lumbar laminectomy November of 2017 at [facility] with resolution of spinal claudication. Improved with epidural   Hematological: Negative. Psychiatric/Behavioral: Negative. Objective   Physical Exam  Constitutional:       General: She is not in acute distress. Appearance: She is well-developed. She is not diaphoretic. HENT:      Head: Normocephalic and atraumatic. Right Ear: Tympanic membrane and external ear normal.      Left Ear: Tympanic membrane and external ear normal.      Nose: Nose normal.   Eyes:      General: No scleral icterus. Right eye: No discharge. Left eye: No discharge. Conjunctiva/sclera: Conjunctivae normal.      Pupils: Pupils are equal, round, and reactive to light. Neck:      Thyroid: No thyromegaly. Vascular: No JVD. Trachea: No tracheal deviation. Cardiovascular:      Rate and Rhythm: Normal rate. Heart sounds: Normal heart sounds. No murmur heard. No gallop. Pulmonary:      Effort: Pulmonary effort is normal. No respiratory distress. Breath sounds: Normal breath sounds. No wheezing or rales. Chest:      Chest wall: No tenderness. Abdominal:      General: Bowel sounds are normal. There is no distension. Palpations: Abdomen is soft. There is no mass. Tenderness: There is no abdominal tenderness. There is no right CVA tenderness, left CVA tenderness, guarding or rebound. Musculoskeletal:         General: No tenderness or deformity. Cervical back: Normal range of motion and neck supple. Right lower leg: No edema. Left lower leg: No edema. Lymphadenopathy:      Cervical: No cervical adenopathy. Skin:     General: Skin is warm and dry. Coloration: Skin is not pale. Findings: No erythema or rash. Neurological:      Mental Status: She is alert and oriented to person, place, and time. Cranial Nerves: No cranial nerve deficit. Motor: No weakness or abnormal muscle tone.       Gait: Gait normal.   Comments:              Psychiatric:         Mood and Affect: Mood normal.         Behavior: Behavior normal.         Thought Content: Thought content normal.         Judgment: Judgment normal.                An electronic signature was used to authenticate this note.    --LADONNA MILLER MD

## 2023-02-11 DIAGNOSIS — E78.2 MIXED HYPERLIPIDEMIA: ICD-10-CM

## 2023-02-11 RX ORDER — LIRAGLUTIDE 6 MG/ML
0.6 INJECTION, SOLUTION SUBCUTANEOUS DAILY
Qty: 4 ADJUSTABLE DOSE PRE-FILLED PEN SYRINGE | Refills: 5 | Status: SHIPPED | OUTPATIENT
Start: 2023-02-11

## 2023-03-03 DIAGNOSIS — B37.31 VAGINAL CANDIDIASIS: Primary | ICD-10-CM

## 2023-03-03 RX ORDER — FLUCONAZOLE 150 MG/1
150 TABLET ORAL ONCE
Qty: 1 TABLET | Refills: 0 | Status: SHIPPED | OUTPATIENT
Start: 2023-03-03 | End: 2023-03-03

## 2023-03-06 ENCOUNTER — OFFICE VISIT (OUTPATIENT)
Dept: PRIMARY CARE CLINIC | Age: 58
End: 2023-03-06

## 2023-03-06 VITALS
SYSTOLIC BLOOD PRESSURE: 96 MMHG | DIASTOLIC BLOOD PRESSURE: 62 MMHG | WEIGHT: 174 LBS | TEMPERATURE: 98.1 F | HEART RATE: 87 BPM | OXYGEN SATURATION: 98 % | BODY MASS INDEX: 28.08 KG/M2

## 2023-03-06 DIAGNOSIS — M54.16 LUMBAR RADICULOPATHY: ICD-10-CM

## 2023-03-06 DIAGNOSIS — M46.1 SACROILIITIS (HCC): ICD-10-CM

## 2023-03-06 PROBLEM — E11.9 TYPE 2 DIABETES MELLITUS (HCC): Status: ACTIVE | Noted: 2023-03-06

## 2023-03-06 RX ORDER — PREGABALIN 25 MG/1
25 CAPSULE ORAL 3 TIMES DAILY
Qty: 90 CAPSULE | Refills: 2 | Status: SHIPPED | OUTPATIENT
Start: 2023-03-06 | End: 2023-03-09 | Stop reason: SDUPTHER
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
## 2023-03-09 ENCOUNTER — TELEPHONE (OUTPATIENT)
Dept: PRIMARY CARE CLINIC | Age: 58
End: 2023-03-09

## 2023-03-09 DIAGNOSIS — M54.16 LUMBAR RADICULOPATHY: ICD-10-CM

## 2023-03-09 RX ORDER — PREGABALIN 25 MG/1
25 CAPSULE ORAL 3 TIMES DAILY
Qty: 90 CAPSULE | Refills: 2 | Status: SHIPPED | OUTPATIENT
Start: 2023-03-09 | End: 2023-04-08

## 2023-03-09 NOTE — TELEPHONE ENCOUNTER
[address] called in regarding the directions for the Lyrica that was sent in for patient. They need to clarify the directions.      Best call back number: [phone]

## 2023-03-12 ASSESSMENT — ENCOUNTER SYMPTOMS
SORE THROAT: 0
CONSTIPATION: 0
BACK PAIN: 1
ABDOMINAL PAIN: 0
BLOOD IN STOOL: 0
PHOTOPHOBIA: 0
SINUS PRESSURE: 0
ANAL BLEEDING: 0
COUGH: 0
ALLERGIC/IMMUNOLOGIC NEGATIVE: 1
RECTAL PAIN: 0
NAUSEA: 0
EYE REDNESS: 0
RHINORRHEA: 0
SHORTNESS OF BREATH: 0
ABDOMINAL DISTENTION: 0
BOWEL INCONTINENCE: 0
EYE PAIN: 0
VOMITING: 0

## 2023-03-16 DIAGNOSIS — M48.062 SPINAL STENOSIS OF LUMBAR REGION WITH NEUROGENIC CLAUDICATION: Primary | ICD-10-CM

## 2023-03-16 RX ORDER — METHYLPREDNISOLONE 4 MG/1
TABLET ORAL
Qty: 1 KIT | Refills: 0 | Status: SHIPPED | OUTPATIENT
Start: 2023-03-16 | End: 2023-03-22

## 2023-04-21 DIAGNOSIS — B96.89 ACUTE BACTERIAL BRONCHITIS: Primary | ICD-10-CM

## 2023-04-21 DIAGNOSIS — J20.8 ACUTE BACTERIAL BRONCHITIS: Primary | ICD-10-CM

## 2023-04-21 RX ORDER — AZITHROMYCIN 250 MG/1
250 TABLET, FILM COATED ORAL SEE ADMIN INSTRUCTIONS
Qty: 6 TABLET | Refills: 0 | Status: SHIPPED | OUTPATIENT
Start: 2023-04-21 | End: 2023-04-26

## 2023-05-08 ENCOUNTER — OFFICE VISIT (OUTPATIENT)
Dept: PRIMARY CARE CLINIC | Age: 58
End: 2023-05-08
Payer: COMMERCIAL

## 2023-05-08 VITALS
DIASTOLIC BLOOD PRESSURE: 65 MMHG | SYSTOLIC BLOOD PRESSURE: 96 MMHG | OXYGEN SATURATION: 97 % | TEMPERATURE: 97.9 F | HEART RATE: 70 BPM | WEIGHT: 175 LBS | BODY MASS INDEX: 28.25 KG/M2

## 2023-05-08 DIAGNOSIS — E11.9 TYPE 2 DIABETES MELLITUS WITHOUT COMPLICATION, WITHOUT LONG-TERM CURRENT USE OF INSULIN (HCC): ICD-10-CM

## 2023-05-08 DIAGNOSIS — J30.89 OTHER ALLERGIC RHINITIS: Primary | ICD-10-CM

## 2023-05-08 DIAGNOSIS — G43.711 INTRACTABLE CHRONIC MIGRAINE WITHOUT AURA AND WITH STATUS MIGRAINOSUS: ICD-10-CM

## 2023-05-08 PROCEDURE — 3044F HG A1C LEVEL LT 7.0%: CPT | Performed by: INTERNAL MEDICINE

## 2023-05-08 PROCEDURE — 99214 OFFICE O/P EST MOD 30 MIN: CPT | Performed by: INTERNAL MEDICINE

## 2023-05-08 RX ORDER — AZELASTINE 1 MG/ML
1 SPRAY, METERED NASAL 2 TIMES DAILY
Qty: 30 ML | Refills: 5 | Status: SHIPPED | OUTPATIENT
Start: 2023-05-08

## 2023-05-08 RX ORDER — LIRAGLUTIDE 6 MG/ML
1.8 INJECTION, SOLUTION SUBCUTANEOUS DAILY
Qty: 4 ADJUSTABLE DOSE PRE-FILLED PEN SYRINGE | Refills: 5 | Status: SHIPPED | OUTPATIENT
Start: 2023-05-08

## 2023-05-08 RX ORDER — MAGNESIUM OXIDE 400 MG/1
400 TABLET ORAL DAILY
Qty: 30 TABLET | Refills: 1 | Status: SHIPPED | OUTPATIENT
Start: 2023-05-08

## 2023-05-08 RX ORDER — NADOLOL 20 MG/1
20 TABLET ORAL DAILY
Qty: 30 TABLET | Refills: 3 | Status: SHIPPED | OUTPATIENT
Start: 2023-05-08

## 2023-05-08 NOTE — PROGRESS NOTES
Taramike Artist (:  1965) is a 65 y.o. female,Established patient, here for evaluation of the following chief complaint(s):  Follow-up         ASSESSMENT/PLAN:  1. Other allergic rhinitis, not controlled, continue zyrtec and flonase and add astelin. -     azelastine (ASTELIN) 0.1 % nasal spray; 1 spray by Nasal route 2 times daily Use in each nostril as directed, Disp-30 mL, R-5Normal  2. Intractable chronic migraine without aura and with status migrainosus, has order for mri of brain and has not done yet. Explained the importance of mri to rule out strokes, aneurysm, tumors with persistent headaches with change in the pattern of headache to being daily in stead of  infrequent. -     nadolol (CORGARD) 20 MG tablet; Take 1 tablet by mouth daily, Disp-30 tablet, R-3Normal  -     magnesium oxide (MAG-OX) 400 MG tablet; Take 1 tablet by mouth daily, Disp-30 tablet, R-1Normal  3. Type 2 diabetes mellitus without complication, without long-term current use of insulin ([address] controlled on Saxenda, continue with diet and exercise. Lab Results   Component Value Date    LABA1C 5.5 2023    LABA1C 5.3 2022    LABA1C 5.6 10/14/2020     Lab Results   Component Value Date    LABMICR Not Indicated 2023    LDLCALC 85 2022    CREATININE 0.6 2023       4. BMI 28.0-28.9,adult improving and tolerating Saxenda 1.2 mg qd, will increase to 1.8 mg qd and if tolerate increase to 2.4 mg qd in 30 days and again to maintenance dose of 3 mg qd, if tolerated. Wt Readings from Last 3 Encounters:   23 175 lb (79.4 kg)   23 174 lb (78.9 kg)   23 175 lb (79.4 kg)   Weight 177 lbs on 23 and 206  lbs on 1/13/15    -     liraglutide-weight management (SAXENDA) 18 MG/3ML SOPN; Inject 1.8 mg into the skin daily, Disp-4 Adjustable Dose Pre-filled Pen Syringe, R-5Normal      Return in about 6 weeks (around 2023).          Subjective   SUBJECTIVE/OBJECTIVE:  Headache  Headache

## 2023-05-12 ASSESSMENT — ENCOUNTER SYMPTOMS
VOMITING: 0
EYE PAIN: 0
SORE THROAT: 0
BLOOD IN STOOL: 0
ABDOMINAL DISTENTION: 0
SINUS PRESSURE: 1
RECTAL PAIN: 0
SINUS COMPLAINT: 1
EYE REDNESS: 0
BACK PAIN: 0
CONSTIPATION: 0
COUGH: 0
ALLERGIC/IMMUNOLOGIC NEGATIVE: 1
ABDOMINAL PAIN: 0
RHINORRHEA: 0
PHOTOPHOBIA: 0
NAUSEA: 0
HOARSE VOICE: 0
ANAL BLEEDING: 0
SHORTNESS OF BREATH: 0
SWOLLEN GLANDS: 0

## 2023-06-19 ENCOUNTER — OFFICE VISIT (OUTPATIENT)
Dept: PRIMARY CARE CLINIC | Age: 58
End: 2023-06-19
Payer: COMMERCIAL

## 2023-06-19 VITALS
HEART RATE: 66 BPM | SYSTOLIC BLOOD PRESSURE: 104 MMHG | WEIGHT: 174.2 LBS | DIASTOLIC BLOOD PRESSURE: 66 MMHG | OXYGEN SATURATION: 98 % | BODY MASS INDEX: 28.12 KG/M2

## 2023-06-19 DIAGNOSIS — A09 DIARRHEA OF INFECTIOUS ORIGIN: Primary | ICD-10-CM

## 2023-06-19 DIAGNOSIS — R10.30 LOWER ABDOMINAL PAIN: ICD-10-CM

## 2023-06-19 DIAGNOSIS — R63.5 WEIGHT GAIN: ICD-10-CM

## 2023-06-19 DIAGNOSIS — R10.13 EPIGASTRIC PAIN: ICD-10-CM

## 2023-06-19 DIAGNOSIS — Z12.31 VISIT FOR SCREENING MAMMOGRAM: ICD-10-CM

## 2023-06-19 DIAGNOSIS — N76.1 CHRONIC VAGINITIS: ICD-10-CM

## 2023-06-19 PROCEDURE — 99214 OFFICE O/P EST MOD 30 MIN: CPT | Performed by: INTERNAL MEDICINE

## 2023-06-19 RX ORDER — FLUCONAZOLE 150 MG/1
150 TABLET ORAL ONCE
Qty: 1 TABLET | Refills: 0 | Status: SHIPPED | OUTPATIENT
Start: 2023-06-19 | End: 2023-06-19

## 2023-06-19 RX ORDER — LIRAGLUTIDE 6 MG/ML
2.4 INJECTION, SOLUTION SUBCUTANEOUS DAILY
Qty: 4 ADJUSTABLE DOSE PRE-FILLED PEN SYRINGE | Refills: 2 | Status: SHIPPED | OUTPATIENT
Start: 2023-06-19

## 2023-06-19 ASSESSMENT — ENCOUNTER SYMPTOMS
PHOTOPHOBIA: 0
BLOOD IN STOOL: 0
SHORTNESS OF BREATH: 0
SWOLLEN GLANDS: 0
ABDOMINAL DISTENTION: 0
FLATUS: 1
RHINORRHEA: 0
NAUSEA: 0
EYE REDNESS: 0
SINUS PRESSURE: 0
VOMITING: 0
ABDOMINAL PAIN: 1
SORE THROAT: 0
HOARSE VOICE: 0
BLOATING: 1
DIARRHEA: 1
COUGH: 0
BACK PAIN: 0
RECTAL PAIN: 0
EYE PAIN: 0
CONSTIPATION: 0
ANAL BLEEDING: 0
ALLERGIC/IMMUNOLOGIC NEGATIVE: 1

## 2023-06-19 NOTE — PROGRESS NOTES
Uma Cousin (:  1965) is a 65 y.o. female,Established patient, here for evaluation of the following chief complaint(s): Allergic Rhinitis  (Headache) and Diarrhea    Wt Readings from Last 3 Encounters:   23 174 lb 3.2 oz (79 kg)   23 175 lb (79.4 kg)   23 174 lb (78.9 kg)          ASSESSMENT/PLAN:  1. Diarrhea of infectious origin for 2 weeks. Will get stool cultures. Concern for diverticulitis with abdominal pain, ,so will get stat ct of abdomen and pelvis, , check labs. Uncontrolled symptoms.  -     [facility] - Fabiola Owen MD, Gastroenterology (ERCP & E[facility]), [facility]  -     C DIFF TOXIN/ANTIGEN; Future  -     GIARDIA ANTIGEN; Future  -     OVA & PARASITE ID/COUNT #1; Future  -     GI Bacterial Pathogens By PCR; Future  -     CBC with Auto Differential; Future  -     Comprehensive Metabolic Panel; Future  -     CT ABDOMEN PELVIS W IV CONTRAST Additional Contrast? Oral; Future  2. Epigastric pain:    -     Lipase; Future  -     CBC with Auto Differential; Future  -     Comprehensive Metabolic Panel; Future  -     CT ABDOMEN PELVIS W IV CONTRAST Additional Contrast? Oral; Future  3. Lower abdominal pain  -     Urinalysis with Microscopic; Future  -     Culture, Urine; Future  -     CT ABDOMEN PELVIS W IV CONTRAST Additional Contrast? Oral; Future  4. Weight gain, plateaued on saxenda 1.8 mg qd. After GI work up , if lipase is normal will increase to 2.4 mg weekly. -     liraglutide-weight management (SAXENDA) 18 MG/3ML SOPN; Inject 2.4 mg into the skin daily, Disp-4 Adjustable Dose Pre-filled Pen Syringe, R-2Normal  5. Chronic vaginitis, white no order,  pruritic, give diflucan  and refer to Shikha Alejandro MD, Gynecology, [facility]  -     fluconazole (DIFLUCAN) 150 MG tablet; Take 1 tablet by mouth once for 1 dose, Disp-1 tablet, R-0Normal  6. Visit for screening mammogram  -     KOREY DIGITAL SCREEN W OR WO CAD BILATERAL;  Future      Return in

## 2023-06-30 DIAGNOSIS — M54.16 LUMBAR RADICULOPATHY: ICD-10-CM

## 2023-07-03 DIAGNOSIS — E55.9 VITAMIN D DEFICIENCY: ICD-10-CM

## 2023-07-03 DIAGNOSIS — M48.061 SPINAL STENOSIS OF LUMBAR REGION WITHOUT NEUROGENIC CLAUDICATION: ICD-10-CM

## 2023-07-03 RX ORDER — BACLOFEN 5 MG/1
TABLET ORAL
Qty: 90 TABLET | Refills: 0 | Status: SHIPPED | OUTPATIENT
Start: 2023-07-03

## 2023-07-03 RX ORDER — MULTIVIT-MIN/IRON/FOLIC ACID/K 18-600-40
1 CAPSULE ORAL DAILY
Qty: 90 CAPSULE | Refills: 1 | Status: SHIPPED | OUTPATIENT
Start: 2023-07-03 | End: 2023-07-09

## 2023-07-03 NOTE — TELEPHONE ENCOUNTER
Future Appointments    Encounter Information    Provider Department Appt Notes   8/2/2023 Zakia Sy MD [address] [facility] Primary Care Return in about 4 weeks     Past Visits    Date Provider Specialty Visit Type Primary Dx   06/19/2023 Zakia Sy MD Primary Care Office Visit Diarrhea of infectious origin

## 2023-07-05 DIAGNOSIS — R10.13 EPIGASTRIC PAIN: ICD-10-CM

## 2023-07-05 DIAGNOSIS — R10.30 LOWER ABDOMINAL PAIN: ICD-10-CM

## 2023-07-05 DIAGNOSIS — A09 DIARRHEA OF INFECTIOUS ORIGIN: ICD-10-CM

## 2023-07-05 LAB
ALBUMIN SERPL-MCNC: 4.7 G/DL (ref 3.4–5)
ALBUMIN/GLOB SERPL: 2.6 {RATIO} (ref 1.1–2.2)
ALP SERPL-CCNC: 47 U/L (ref 40–129)
ALT SERPL-CCNC: 23 U/L (ref 10–40)
ANION GAP SERPL CALCULATED.3IONS-SCNC: 9 MMOL/L (ref 3–16)
AST SERPL-CCNC: 19 U/L (ref 15–37)
BACTERIA URNS QL MICRO: ABNORMAL /HPF
BASOPHILS # BLD: 0 K/UL (ref 0–0.2)
BASOPHILS NFR BLD: 0.6 %
BILIRUB SERPL-MCNC: 1.5 MG/DL (ref 0–1)
BILIRUB UR QL STRIP.AUTO: NEGATIVE
BUN SERPL-MCNC: 18 MG/DL (ref 7–20)
CALCIUM OXALATE CRYSTALS: PRESENT
CALCIUM SERPL-MCNC: 9.4 MG/DL (ref 8.3–10.6)
CHLORIDE SERPL-SCNC: 105 MMOL/L (ref 99–110)
CLARITY UR: CLEAR
CO2 SERPL-SCNC: 27 MMOL/L (ref 21–32)
COLOR UR: ABNORMAL
CREAT SERPL-MCNC: 0.6 MG/DL (ref 0.6–1.1)
DEPRECATED RDW RBC AUTO: 13 % (ref 12.4–15.4)
EOSINOPHIL # BLD: 0 K/UL (ref 0–0.6)
EOSINOPHIL NFR BLD: 1 %
EPI CELLS #/AREA URNS AUTO: 3 /HPF (ref 0–5)
GFR SERPLBLD CREATININE-BSD FMLA CKD-EPI: >60 ML/MIN/{1.73_M2}
GLUCOSE SERPL-MCNC: 90 MG/DL (ref 70–99)
GLUCOSE UR STRIP.AUTO-MCNC: NEGATIVE MG/DL
HCT VFR BLD AUTO: 42.5 % (ref 36–48)
HGB BLD-MCNC: 14.2 G/DL (ref 12–16)
HGB UR QL STRIP.AUTO: NEGATIVE
HYALINE CASTS #/AREA URNS AUTO: 1 /LPF (ref 0–8)
KETONES UR STRIP.AUTO-MCNC: ABNORMAL MG/DL
LEUKOCYTE ESTERASE UR QL STRIP.AUTO: NEGATIVE
LIPASE SERPL-CCNC: 74 U/L (ref 13–60)
LYMPHOCYTES # BLD: 1.4 K/UL (ref 1–5.1)
LYMPHOCYTES NFR BLD: 34 %
MCH RBC QN AUTO: 31.7 PG (ref 26–34)
MCHC RBC AUTO-ENTMCNC: 33.5 G/DL (ref 31–36)
MCV RBC AUTO: 94.6 FL (ref 80–100)
MONOCYTES # BLD: 0.3 K/UL (ref 0–1.3)
MONOCYTES NFR BLD: 7.5 %
NEUTROPHILS # BLD: 2.3 K/UL (ref 1.7–7.7)
NEUTROPHILS NFR BLD: 56.9 %
NITRITE UR QL STRIP.AUTO: NEGATIVE
PH UR STRIP.AUTO: 5.5 [PH] (ref 5–8)
PLATELET # BLD AUTO: 248 K/UL (ref 135–450)
PMV BLD AUTO: 7.9 FL (ref 5–10.5)
POTASSIUM SERPL-SCNC: 4.2 MMOL/L (ref 3.5–5.1)
PROT SERPL-MCNC: 6.5 G/DL (ref 6.4–8.2)
PROT UR STRIP.AUTO-MCNC: ABNORMAL MG/DL
RBC # BLD AUTO: 4.49 M/UL (ref 4–5.2)
RBC CLUMPS #/AREA URNS AUTO: 1 /HPF (ref 0–4)
SODIUM SERPL-SCNC: 141 MMOL/L (ref 136–145)
SP GR UR STRIP.AUTO: 1.04 (ref 1–1.03)
UA DIPSTICK W REFLEX MICRO PNL UR: YES
URN SPEC COLLECT METH UR: ABNORMAL
UROBILINOGEN UR STRIP-ACNC: 1 E.U./DL
WBC # BLD AUTO: 4 K/UL (ref 4–11)
WBC #/AREA URNS AUTO: 1 /HPF (ref 0–5)

## 2023-07-05 RX ORDER — PREGABALIN 25 MG/1
25 CAPSULE ORAL 3 TIMES DAILY
Qty: 90 CAPSULE | Refills: 2 | Status: SHIPPED | OUTPATIENT
Start: 2023-07-05 | End: 2023-08-04

## 2023-07-07 LAB — BACTERIA UR CULT: NORMAL

## 2023-07-09 ENCOUNTER — PATIENT MESSAGE (OUTPATIENT)
Dept: PRIMARY CARE CLINIC | Age: 58
End: 2023-07-09

## 2023-07-09 DIAGNOSIS — R11.0 NAUSEA: Primary | ICD-10-CM

## 2023-07-09 DIAGNOSIS — R17 TOTAL BILIRUBIN, ELEVATED: ICD-10-CM

## 2023-07-09 DIAGNOSIS — E55.9 VITAMIN D DEFICIENCY: ICD-10-CM

## 2023-07-09 RX ORDER — MULTIVIT-MIN/IRON/FOLIC ACID/K 18-600-40
1 CAPSULE ORAL DAILY
Qty: 90 CAPSULE | Refills: 1 | Status: SHIPPED | OUTPATIENT
Start: 2023-07-09

## 2023-07-09 NOTE — RESULT ENCOUNTER NOTE
Patient called. [place] [employer] stopped due to elevated lipase. Has nausea, no vomiting. Come in for a repeat level lipase with total and direct bilirubin. Go To ER if vomiting, abdominal pain.

## 2023-07-10 DIAGNOSIS — R17 TOTAL BILIRUBIN, ELEVATED: ICD-10-CM

## 2023-07-10 LAB
ALBUMIN SERPL-MCNC: 4.7 G/DL (ref 3.4–5)
ALP SERPL-CCNC: 46 U/L (ref 40–129)
ALT SERPL-CCNC: 28 U/L (ref 10–40)
AST SERPL-CCNC: 20 U/L (ref 15–37)
BILIRUB DIRECT SERPL-MCNC: 0.3 MG/DL (ref 0–0.3)
BILIRUB INDIRECT SERPL-MCNC: 1.3 MG/DL (ref 0–1)
BILIRUB SERPL-MCNC: 1.6 MG/DL (ref 0–1)
LIPASE SERPL-CCNC: 55 U/L (ref 13–60)
PROT SERPL-MCNC: 6.3 G/DL (ref 6.4–8.2)

## 2023-07-10 NOTE — TELEPHONE ENCOUNTER
From: Bhumika Roman  To: Dr. Melvin Vinson: 7/9/2023 11:53 PM EDT  Subject: return visit    Hi Doctor Emanuel Acosta, do I need to call to make an appt for 7/19 ? I return from vacation 7/17.  praying

## 2023-07-11 DIAGNOSIS — R11.0 NAUSEA: Primary | ICD-10-CM

## 2023-07-11 DIAGNOSIS — R17 ELEVATED BILIRUBIN: ICD-10-CM

## 2023-07-11 DIAGNOSIS — R74.8 ELEVATED LIPASE: ICD-10-CM

## 2023-07-11 NOTE — RESULT ENCOUNTER NOTE
Borderline elevated direct bilirubin. Nausea is a little better after holding Saxenda 10 days. [employer] was discontinued with nausea severe and elevated total bilirubin and elevated lipase. Repeat lipase level was decreasing. Patient contacted and will get gallbladder ultrasound to make sure no bile duct obstruction.

## 2023-07-12 ENCOUNTER — HOSPITAL ENCOUNTER (OUTPATIENT)
Dept: ULTRASOUND IMAGING | Age: 58
Discharge: HOME | End: 2023-07-12
Payer: COMMERCIAL

## 2023-07-12 DIAGNOSIS — R74.8 ELEVATED LIPASE: ICD-10-CM

## 2023-07-12 DIAGNOSIS — R17 ELEVATED BILIRUBIN: ICD-10-CM

## 2023-07-12 DIAGNOSIS — R11.0 NAUSEA: ICD-10-CM

## 2023-07-12 PROCEDURE — 76705 ECHO EXAM OF ABDOMEN: CPT

## 2023-08-02 ENCOUNTER — OFFICE VISIT (OUTPATIENT)
Dept: PRIMARY CARE CLINIC | Age: 58
End: 2023-08-02
Payer: COMMERCIAL

## 2023-08-02 VITALS
WEIGHT: 177 LBS | SYSTOLIC BLOOD PRESSURE: 104 MMHG | DIASTOLIC BLOOD PRESSURE: 72 MMHG | BODY MASS INDEX: 28.45 KG/M2 | HEIGHT: 66 IN | OXYGEN SATURATION: 98 % | TEMPERATURE: 97.7 F | HEART RATE: 55 BPM

## 2023-08-02 DIAGNOSIS — M54.17 RADICULOPATHY, LUMBOSACRAL REGION: ICD-10-CM

## 2023-08-02 DIAGNOSIS — R25.2 MUSCLE CRAMPS AT NIGHT: ICD-10-CM

## 2023-08-02 DIAGNOSIS — M54.32 SCIATICA, LEFT SIDE: ICD-10-CM

## 2023-08-02 DIAGNOSIS — R07.9 CHEST PAIN, UNSPECIFIED TYPE: Primary | ICD-10-CM

## 2023-08-02 DIAGNOSIS — R29.898 LEFT LEG WEAKNESS: ICD-10-CM

## 2023-08-02 PROCEDURE — 99214 OFFICE O/P EST MOD 30 MIN: CPT | Performed by: INTERNAL MEDICINE

## 2023-08-02 RX ORDER — GINGER ROOT/GINGER ROOT EXT 262.5 MG
1 CAPSULE ORAL 2 TIMES DAILY
Qty: 180 TABLET | Refills: 3 | Status: SHIPPED | OUTPATIENT
Start: 2023-08-02

## 2023-08-02 ASSESSMENT — ENCOUNTER SYMPTOMS
NAUSEA: 0
ABDOMINAL PAIN: 0

## 2023-08-02 NOTE — PROGRESS NOTES
Marina Kennedy (:  1965) is a 65 y.o. female,Established patient, here for evaluation of the following chief complaint(s):  Follow-up         ASSESSMENT/PLAN:  1. Chest pain, unspecified type , clinically no sign of PE with good O2 sat of 98%. No associated sob and not with activity. Will futher evaluate. Lab Results   Component Value Date    CHOL 178 2022    CHOL 212 (H) 10/14/2020    CHOL 234 (H) 2017     Lab Results   Component Value Date    TRIG 50 2022    TRIG 63 10/14/2020    TRIG 66 2017     Lab Results   Component Value Date    HDL 83 (H) 2022    HDL 77 (H) 10/14/2020     (H) 2017     Lab Results   Component Value Date    LDLCALC 85 2022    LDLCALC 122 (H) 10/14/2020    LDLCALC 104 (H) 2017     Lab Results   Component Value Date    LABVLDL 10 2022    LABVLDL 13 10/14/2020    LABVLDL 13 2017     No results found for: CHOLHDLRATIO   -     XR CHEST STANDARD (2 VW); Future  -     Lipid Panel; Future  Pulse Readings from Last 3 Encounters:   23 55   23 66   23 70       2. Radiculopathy, lumbosacral region with leg weakness, not improved with lyrica. Seeing interventional pain center. Concerning new leg weakness with more difficulty walking. Will further evaluate. Doing well with weight reduction, but would like additional support with  weight management to help back. Unfortunately had to stop saxsenda due to nausea and vomiting and elevated lipase. -     MRI LUMBAR SPINE WO CONTRAST; Future  -     [employer], Nutrition Services, [facility]  3. Left leg weakness, same as problem #2.  -     MRI LUMBAR SPINE WO CONTRAST; Future  -     [employer], Nutrition Services, [facility]  4. Sciatica, left side, same as #2.  -     [id number], Nutrition Services, [facility]  5. Muscle cramps at night with low calcium intake in diet, out of supplement for several months .  Will

## 2023-08-03 ASSESSMENT — ENCOUNTER SYMPTOMS
SINUS PRESSURE: 0
EYE PAIN: 0
EYE REDNESS: 0
VOMITING: 0
RHINORRHEA: 0
BACK PAIN: 1
SORE THROAT: 0
RECTAL PAIN: 0
SHORTNESS OF BREATH: 0
BOWEL INCONTINENCE: 0
CONSTIPATION: 0
PHOTOPHOBIA: 0
ALLERGIC/IMMUNOLOGIC NEGATIVE: 1
BLOOD IN STOOL: 0
ABDOMINAL DISTENTION: 0
COUGH: 0
ANAL BLEEDING: 0

## 2023-08-03 ASSESSMENT — COPD QUESTIONNAIRES: COPD: 0

## 2023-08-07 ENCOUNTER — TELEPHONE (OUTPATIENT)
Dept: PRIMARY CARE CLINIC | Age: 58
End: 2023-08-07

## 2023-08-07 NOTE — TELEPHONE ENCOUNTER
Called pt to make her aware that her FMLA forms were ready for pickup, no answer and mailbox is full.

## 2023-08-25 DIAGNOSIS — N95.1 PERI-MENOPAUSAL: ICD-10-CM

## 2023-08-25 DIAGNOSIS — K21.00 GASTROESOPHAGEAL REFLUX DISEASE WITH ESOPHAGITIS: ICD-10-CM

## 2023-08-25 RX ORDER — LANSOPRAZOLE 30 MG/1
CAPSULE, DELAYED RELEASE ORAL
Qty: 90 CAPSULE | Refills: 3 | Status: SHIPPED | OUTPATIENT
Start: 2023-08-25

## 2023-08-25 NOTE — TELEPHONE ENCOUNTER
Future Appointments    Encounter Information    Provider Department Appt Notes   10/4/2023 Michael Beckett Primary Care 2 month check up DM     Past Visits    Date Provider Specialty Visit Type Primary Dx   08/02/2023 Dilcia Ludwig MD Primary Care Office Visit Chest pain, unspecified type

## 2023-09-01 DIAGNOSIS — N95.1 PERI-MENOPAUSAL: ICD-10-CM

## 2023-09-08 ENCOUNTER — TELEPHONE (OUTPATIENT)
Dept: ADMINISTRATIVE | Age: 58
End: 2023-09-08

## 2023-09-08 NOTE — TELEPHONE ENCOUNTER
Submitted PA for Lansoprazole [facility]: [id number] STATUS: APPROVED  8/9/2023- 9/7/2024. If this requires a response please respond to the pool ( [phone]). Thank you please advise patient. Statement Selected

## 2023-09-13 ENCOUNTER — HOSPITAL ENCOUNTER (OUTPATIENT)
Dept: MAMMOGRAPHY | Age: 58
Discharge: HOME | End: 2023-09-13
Payer: COMMERCIAL

## 2023-09-13 VITALS — HEIGHT: 65 IN | WEIGHT: 172 LBS | BODY MASS INDEX: 28.66 KG/M2

## 2023-09-13 DIAGNOSIS — Z12.31 VISIT FOR SCREENING MAMMOGRAM: ICD-10-CM

## 2023-09-13 PROCEDURE — 77067 SCR MAMMO BI INCL CAD: CPT

## 2023-09-21 DIAGNOSIS — R92.2 DENSE BREAST TISSUE ON MAMMOGRAM: Primary | ICD-10-CM

## 2023-10-04 ENCOUNTER — OFFICE VISIT (OUTPATIENT)
Dept: PRIMARY CARE CLINIC | Age: 58
End: 2023-10-04
Payer: COMMERCIAL

## 2023-10-04 VITALS
BODY MASS INDEX: 28.79 KG/M2 | WEIGHT: 173 LBS | TEMPERATURE: 97.5 F | DIASTOLIC BLOOD PRESSURE: 60 MMHG | OXYGEN SATURATION: 98 % | HEART RATE: 69 BPM | SYSTOLIC BLOOD PRESSURE: 100 MMHG

## 2023-10-04 DIAGNOSIS — M54.31 SCIATICA, RIGHT SIDE: Primary | ICD-10-CM

## 2023-10-04 DIAGNOSIS — K21.00 GASTROESOPHAGEAL REFLUX DISEASE WITH ESOPHAGITIS WITHOUT HEMORRHAGE: ICD-10-CM

## 2023-10-04 PROCEDURE — 99214 OFFICE O/P EST MOD 30 MIN: CPT | Performed by: INTERNAL MEDICINE

## 2023-10-04 RX ORDER — LANSOPRAZOLE 30 MG/1
CAPSULE, DELAYED RELEASE ORAL
Qty: 90 CAPSULE | Refills: 3 | Status: SHIPPED | OUTPATIENT
Start: 2023-10-04

## 2023-10-04 RX ORDER — METHYLPREDNISOLONE 4 MG/1
TABLET ORAL
Qty: 1 KIT | Refills: 0 | Status: SHIPPED | OUTPATIENT
Start: 2023-10-04 | End: 2023-10-10

## 2023-10-04 NOTE — PATIENT INSTRUCTIONS
Low carbohydrate low fat diet:  Drink only water. You can have home made tea or lemonade. Only portia with Stevia  Try to drink 8 glasses of water daily. Each meal or snack should consist of 3/4 green vegetables and 1/4 lean protein. Lean protein :  Eggs, fish with fins, chicken or turkey breast without out skin, beef or pork. Means and poultry should be lean and broiled, so the fat fall away and not consumed by you. Eat only the berries for fruit , since the berries are low in carbohydrates. You can pick from a variety of berries such as blue berries, black berries, strawberries, raspberries.  acai berries and etc.

## 2023-10-04 NOTE — PROGRESS NOTES
patient is sturggling with weight gain) relief. Gastroesophageal Reflux  She reports no abdominal pain, no belching, no chest pain, no choking, no coughing, no dysphagia, no early satiety, no globus sensation, no heartburn, no hoarse voice, no nausea, no sore throat, no stridor, no tooth decay, no water brash or no wheezing. Symptom control at lansoprazole. This is a chronic problem. The current episode started more than 1 year ago. The problem occurs rarely. The problem has been unchanged. The symptoms are aggravated by certain foods. Pertinent negatives include no anemia, fatigue, melena, muscle weakness, orthopnea or weight loss. There are no known risk factors. She has tried a PPI (Lansoprazole helpful but omeprazole was not) for the symptoms. The treatment provided significant relief. Past procedures do not include an abdominal ultrasound or H. pylori antibody titer. Review of Systems   Constitutional:  Negative for chills, diaphoresis, fatigue, fever and weight loss. HENT:  Negative for congestion, hoarse voice, postnasal drip, rhinorrhea, sinus pressure and sore throat. Eyes:  Negative for photophobia, pain and redness. Respiratory:  Negative for cough, choking, shortness of breath and wheezing. Cardiovascular:  Negative for chest pain and leg swelling. Gastrointestinal:  Negative for abdominal distention, abdominal pain, anal bleeding, blood in stool, bowel incontinence, constipation, dysphagia, heartburn, melena, nausea, rectal pain and vomiting. Endocrine: Negative. Genitourinary: Negative. Negative for bladder incontinence, dysuria and pelvic pain. Musculoskeletal:  Positive for back pain. Negative for arthralgias, joint swelling, myalgias, muscle weakness and neck pain. More left leg numbness and weakness, causing some intermittent gait instability    Right SI joint pain relieved with injection. Skin: Negative. Negative for rash. Allergic/Immunologic: Negative.

## 2023-10-05 ENCOUNTER — PATIENT MESSAGE (OUTPATIENT)
Dept: PRIMARY CARE CLINIC | Age: 58
End: 2023-10-05

## 2023-10-06 NOTE — TELEPHONE ENCOUNTER
From: Nimco Foster  To: Dr. Long Osullivan: 10/5/2023 6:08 PM EDT  Subject: Skye Sarmiento doctor Den Christianson I forgot to ask you if I needed a TDap. When was my last one? If I need one can you please order ASAP! I need it for clinical. Please let me know.  Thanks

## 2023-10-08 PROBLEM — M54.31 SCIATICA, RIGHT SIDE: Status: ACTIVE | Noted: 2017-09-20

## 2023-10-08 ASSESSMENT — ENCOUNTER SYMPTOMS
SHORTNESS OF BREATH: 0
PHOTOPHOBIA: 0
RHINORRHEA: 0
BELCHING: 0
ANAL BLEEDING: 0
CHOKING: 0
SORE THROAT: 0
BLOOD IN STOOL: 0
VOMITING: 0
STRIDOR: 0
SINUS PRESSURE: 0
WHEEZING: 0
RECTAL PAIN: 0
WATER BRASH: 0
HEARTBURN: 0
BACK PAIN: 1
HOARSE VOICE: 0
NAUSEA: 0
EYE REDNESS: 0
ABDOMINAL PAIN: 0
CONSTIPATION: 0
ALLERGIC/IMMUNOLOGIC NEGATIVE: 1
ABDOMINAL DISTENTION: 0
BOWEL INCONTINENCE: 0
GLOBUS SENSATION: 0
EYE PAIN: 0
COUGH: 0

## 2023-10-13 DIAGNOSIS — M48.061 SPINAL STENOSIS OF LUMBAR REGION WITHOUT NEUROGENIC CLAUDICATION: ICD-10-CM

## 2023-10-13 RX ORDER — BACLOFEN 5 MG/1
TABLET ORAL
Qty: 90 TABLET | Refills: 3 | Status: SHIPPED | OUTPATIENT
Start: 2023-10-13 | End: 2023-11-08

## 2023-10-13 NOTE — TELEPHONE ENCOUNTER
Medication:   Requested Prescriptions     Pending Prescriptions Disp Refills    Baclofen (LIORESAL) 5 MG tablet [Pharmacy Med Name: Baclofen 5 MG Oral Tablet] 90 tablet 0     Sig: TAKE 1 TABLET BY MOUTH THREE TIMES DAILY AS NEEDED FOR BACK PAIN        Last Filled:      Patient Phone Number: [phone] (home)     Last appt: 10/4/2023   Next appt: 11/8/2023    Last OARRS:        No data to display

## 2023-11-02 ENCOUNTER — TELEPHONE (OUTPATIENT)
Dept: PRIMARY CARE CLINIC | Age: 58
End: 2023-11-02

## 2023-11-02 NOTE — TELEPHONE ENCOUNTER
----- Message from Jamee Triana sent at 11/2/2023 12:34 PM EDT -----  Regarding: Back pain  Contact: [phone]  Hi Dr. Urbano. My back pain is hurting a lot. It seems like it’s getting worse. Pain is radiating down my right leg to my feet. Shooting pain like electric shocks in my butt/ right buttocks. Is my MRI you scheduled still active? I have not went yet but I will. Want to see what’s going on!! I have a neurologist and for an injection 11/. 10.  This week s scary

## 2023-11-02 NOTE — TELEPHONE ENCOUNTER
DIAGNOSIS: Clavicle fracture   APPOINTMENT DATE: 04/11/2022   NOTES STATUS DETAILS   OFFICE NOTE from referring provider N/A    OFFICE NOTE from other specialist N/A    DISCHARGE SUMMARY from hospital N/A    DISCHARGE REPORT from the ER Internal 04/10/2022 [facility] ED Dr Fishman    OPERATIVE REPORT N/A    MEDICATION LIST N/A    EMG (for Spine) N/A    IMPLANT RECORD/STICKER N/A    LABS     CBC/DIFF N/A    CULTURES N/A    INJECTIONS DONE IN RADIOLOGY N/A    MRI N/A    CT SCAN N/A    XRAYS (IMAGES & REPORTS) Internal 04/10/2022 [facility] shoulder   TUMOR     PATHOLOGY  Slides & report N/A       Please advise

## 2023-11-03 NOTE — TELEPHONE ENCOUNTER
My chart message sent the order is still good. Call  to schedule.  Also since I am not in the office tomorrow. Call to get an appointment with any MD to be evaluated.

## 2023-11-08 ENCOUNTER — OFFICE VISIT (OUTPATIENT)
Dept: PRIMARY CARE CLINIC | Age: 58
End: 2023-11-08
Payer: COMMERCIAL

## 2023-11-08 VITALS
HEIGHT: 65 IN | OXYGEN SATURATION: 100 % | HEART RATE: 56 BPM | BODY MASS INDEX: 28.99 KG/M2 | SYSTOLIC BLOOD PRESSURE: 106 MMHG | TEMPERATURE: 97.8 F | WEIGHT: 174 LBS | DIASTOLIC BLOOD PRESSURE: 68 MMHG

## 2023-11-08 DIAGNOSIS — K58.1 IRRITABLE BOWEL SYNDROME WITH CONSTIPATION: ICD-10-CM

## 2023-11-08 DIAGNOSIS — L85.3 DRY SKIN: ICD-10-CM

## 2023-11-08 DIAGNOSIS — L50.9 HIVES: ICD-10-CM

## 2023-11-08 DIAGNOSIS — M48.061 SPINAL STENOSIS OF LUMBAR REGION WITHOUT NEUROGENIC CLAUDICATION: Primary | ICD-10-CM

## 2023-11-08 DIAGNOSIS — Z13.220 SCREENING CHOLESTEROL LEVEL: ICD-10-CM

## 2023-11-08 DIAGNOSIS — R68.89 COLD INTOLERANCE: ICD-10-CM

## 2023-11-08 PROCEDURE — 99214 OFFICE O/P EST MOD 30 MIN: CPT | Performed by: INTERNAL MEDICINE

## 2023-11-08 RX ORDER — EPINEPHRINE 0.3 MG/.3ML
0.3 INJECTION SUBCUTANEOUS ONCE
Qty: 1 EACH | Refills: 0 | Status: SHIPPED | OUTPATIENT
Start: 2023-11-08 | End: 2023-11-08

## 2023-11-08 RX ORDER — BACLOFEN 10 MG/1
10 TABLET ORAL 3 TIMES DAILY
Qty: 90 TABLET | Refills: 3 | Status: SHIPPED | OUTPATIENT
Start: 2023-11-08

## 2023-11-08 ASSESSMENT — ENCOUNTER SYMPTOMS
CHOKING: 0
VOMITING: 0
WHEEZING: 0
EYE PAIN: 0
SINUS PRESSURE: 0
PHOTOPHOBIA: 0
COUGH: 0
NAUSEA: 0
ABDOMINAL PAIN: 0
BOWEL INCONTINENCE: 0
SORE THROAT: 0
RECTAL PAIN: 0
BLOOD IN STOOL: 0
ALLERGIC/IMMUNOLOGIC NEGATIVE: 1
ANAL BLEEDING: 0
RHINORRHEA: 0
BACK PAIN: 1
EYE REDNESS: 0
ABDOMINAL DISTENTION: 0
SHORTNESS OF BREATH: 0

## 2023-11-08 NOTE — PROGRESS NOTES
with resolution of spinal claudication. Improved with epidural   Hematological: Negative. Psychiatric/Behavioral: Negative. Objective   Physical Exam  Constitutional:       General: She is not in acute distress. Appearance: She is well-developed. She is not diaphoretic. HENT:      Head: Normocephalic and atraumatic. Right Ear: Tympanic membrane and external ear normal.      Left Ear: Tympanic membrane and external ear normal.      Nose: Nose normal.   Eyes:      General: No scleral icterus. Right eye: No discharge. Left eye: No discharge. Conjunctiva/sclera: Conjunctivae normal.      Pupils: Pupils are equal, round, and reactive to light. Neck:      Thyroid: No thyromegaly. Vascular: No JVD. Trachea: No tracheal deviation. Cardiovascular:      Rate and Rhythm: Normal rate. Heart sounds: Normal heart sounds. No murmur heard. No gallop. Pulmonary:      Effort: Pulmonary effort is normal. No respiratory distress. Breath sounds: Normal breath sounds. No wheezing or rales. Chest:      Chest wall: No tenderness. Abdominal:      General: Bowel sounds are normal. There is no distension. Palpations: Abdomen is soft. There is no mass. Tenderness: There is no abdominal tenderness. There is no right CVA tenderness, left CVA tenderness, guarding or rebound. Musculoskeletal:         General: No tenderness or deformity. Cervical back: Normal range of motion and neck supple. Right lower leg: No edema. Left lower leg: No edema. Comments: Right leg weakness and unable to walk on heal and toe on right. A lot of muscle spasm on right. Lymphadenopathy:      Cervical: No cervical adenopathy. Skin:     General: Skin is warm and dry. Coloration: Skin is not jaundiced or pale. Findings: No bruising, erythema or rash. Neurological:      Mental Status: She is alert and oriented to person, place, and time.

## 2023-11-12 ASSESSMENT — ENCOUNTER SYMPTOMS
HEMATOCHEZIA: 0
CONSTIPATION: 1

## 2023-11-20 DIAGNOSIS — L85.3 DRY SKIN: ICD-10-CM

## 2023-11-20 DIAGNOSIS — R68.89 COLD INTOLERANCE: ICD-10-CM

## 2023-11-20 DIAGNOSIS — Z13.220 SCREENING CHOLESTEROL LEVEL: ICD-10-CM

## 2023-11-20 LAB
ALBUMIN SERPL-MCNC: 4.8 G/DL (ref 3.4–5)
ALBUMIN/GLOB SERPL: 3.2 {RATIO} (ref 1.1–2.2)
ALP SERPL-CCNC: 60 U/L (ref 40–129)
ALT SERPL-CCNC: 16 U/L (ref 10–40)
ANION GAP SERPL CALCULATED.3IONS-SCNC: 7 MMOL/L (ref 3–16)
AST SERPL-CCNC: 17 U/L (ref 15–37)
BASOPHILS # BLD: 0 K/UL (ref 0–0.2)
BASOPHILS NFR BLD: 0.6 %
BILIRUB SERPL-MCNC: 1.6 MG/DL (ref 0–1)
BUN SERPL-MCNC: 13 MG/DL (ref 7–20)
CALCIUM SERPL-MCNC: 9.3 MG/DL (ref 8.3–10.6)
CHLORIDE SERPL-SCNC: 106 MMOL/L (ref 99–110)
CHOLEST SERPL-MCNC: 195 MG/DL (ref 0–199)
CO2 SERPL-SCNC: 29 MMOL/L (ref 21–32)
CREAT SERPL-MCNC: 0.5 MG/DL (ref 0.6–1.1)
DEPRECATED RDW RBC AUTO: 13.5 % (ref 12.4–15.4)
EOSINOPHIL # BLD: 0.1 K/UL (ref 0–0.6)
EOSINOPHIL NFR BLD: 1.5 %
GFR SERPLBLD CREATININE-BSD FMLA CKD-EPI: >60 ML/MIN/{1.73_M2}
GLUCOSE SERPL-MCNC: 87 MG/DL (ref 70–99)
HCT VFR BLD AUTO: 42 % (ref 36–48)
HDLC SERPL-MCNC: 83 MG/DL (ref 40–60)
HGB BLD-MCNC: 14.1 G/DL (ref 12–16)
LDLC SERPL CALC-MCNC: 104 MG/DL
LYMPHOCYTES # BLD: 1.4 K/UL (ref 1–5.1)
LYMPHOCYTES NFR BLD: 40.7 %
MCH RBC QN AUTO: 31.3 PG (ref 26–34)
MCHC RBC AUTO-ENTMCNC: 33.6 G/DL (ref 31–36)
MCV RBC AUTO: 93 FL (ref 80–100)
MONOCYTES # BLD: 0.3 K/UL (ref 0–1.3)
MONOCYTES NFR BLD: 8.4 %
NEUTROPHILS # BLD: 1.7 K/UL (ref 1.7–7.7)
NEUTROPHILS NFR BLD: 48.8 %
PLATELET # BLD AUTO: 229 K/UL (ref 135–450)
PMV BLD AUTO: 8.4 FL (ref 5–10.5)
POTASSIUM SERPL-SCNC: 4.4 MMOL/L (ref 3.5–5.1)
PROT SERPL-MCNC: 6.3 G/DL (ref 6.4–8.2)
RBC # BLD AUTO: 4.51 M/UL (ref 4–5.2)
SODIUM SERPL-SCNC: 142 MMOL/L (ref 136–145)
TRIGL SERPL-MCNC: 39 MG/DL (ref 0–150)
TSH SERPL DL<=0.005 MIU/L-ACNC: 2.06 UIU/ML (ref 0.27–4.2)
VLDLC SERPL CALC-MCNC: 8 MG/DL
WBC # BLD AUTO: 3.4 K/UL (ref 4–11)

## 2023-11-22 DIAGNOSIS — R17 ELEVATED BILIRUBIN: Primary | ICD-10-CM

## 2023-11-22 DIAGNOSIS — D70.8 OTHER NEUTROPENIA (HCC): ICD-10-CM

## 2023-11-23 NOTE — RESULT ENCOUNTER NOTE
Normal kidney blood test.  All liver test are normal except the bilirubin is elevated.  However the direct bilirubin is normal but the upper limits of normal.  We need to make sure there is no partial bile duct obstruction.  I will order CT of the abdomen and pelvis.  Call scheduling at [phone] to arrange.  This will probably all be normal and the elevated bili represents Gilbert's syndrome which is hereditary and does not cause any problems.  The white blood cell count fluctuates and has gone from normal to mildly decreased we should repeat it in a month.  I placed orders so you can get this done.  You are not anemic and a platelet count was normal.  I am glad you are checking your results.

## 2023-12-06 ENCOUNTER — OFFICE VISIT (OUTPATIENT)
Dept: PRIMARY CARE CLINIC | Age: 58
End: 2023-12-06
Payer: COMMERCIAL

## 2023-12-06 VITALS
HEART RATE: 68 BPM | DIASTOLIC BLOOD PRESSURE: 58 MMHG | BODY MASS INDEX: 30.06 KG/M2 | WEIGHT: 180.4 LBS | OXYGEN SATURATION: 99 % | SYSTOLIC BLOOD PRESSURE: 98 MMHG | TEMPERATURE: 97.3 F | HEIGHT: 65 IN

## 2023-12-06 DIAGNOSIS — R17 ELEVATED BILIRUBIN: Primary | ICD-10-CM

## 2023-12-06 DIAGNOSIS — J30.89 OTHER ALLERGIC RHINITIS: ICD-10-CM

## 2023-12-06 DIAGNOSIS — R63.5 WEIGHT GAIN: ICD-10-CM

## 2023-12-06 DIAGNOSIS — N95.1 PERI-MENOPAUSAL: ICD-10-CM

## 2023-12-06 PROCEDURE — 99214 OFFICE O/P EST MOD 30 MIN: CPT | Performed by: INTERNAL MEDICINE

## 2023-12-06 RX ORDER — LIRAGLUTIDE 6 MG/ML
0.6 INJECTION, SOLUTION SUBCUTANEOUS DAILY
Qty: 28 ADJUSTABLE DOSE PRE-FILLED PEN SYRINGE | Refills: 0 | Status: SHIPPED | OUTPATIENT
Start: 2023-12-06

## 2023-12-06 RX ORDER — CETIRIZINE HYDROCHLORIDE 10 MG/1
10 TABLET ORAL DAILY
Qty: 30 TABLET | Refills: 5 | Status: SHIPPED | OUTPATIENT
Start: 2023-12-06

## 2023-12-06 ASSESSMENT — ENCOUNTER SYMPTOMS
ANAL BLEEDING: 0
PHOTOPHOBIA: 0
COUGH: 0
BLOOD IN STOOL: 0
CHOKING: 0
NAUSEA: 0
WHEEZING: 0
SHORTNESS OF BREATH: 0
ABDOMINAL PAIN: 0
ABDOMINAL DISTENTION: 0
CONSTIPATION: 1
BACK PAIN: 1
SINUS PRESSURE: 0
SORE THROAT: 0
EYE REDNESS: 0
RECTAL PAIN: 0
EYE PAIN: 0
ALLERGIC/IMMUNOLOGIC NEGATIVE: 1
RHINORRHEA: 0
VOMITING: 0

## 2023-12-06 NOTE — PROGRESS NOTES
Normocephalic and atraumatic. Right Ear: Tympanic membrane and external ear normal.      Left Ear: Tympanic membrane and external ear normal.      Nose: Nose normal.   Eyes:      General: No scleral icterus. Right eye: No discharge. Left eye: No discharge. Conjunctiva/sclera: Conjunctivae normal.      Pupils: Pupils are equal, round, and reactive to light. Neck:      Thyroid: No thyromegaly. Vascular: No JVD. Trachea: No tracheal deviation. Cardiovascular:      Rate and Rhythm: Normal rate. Heart sounds: Normal heart sounds. No murmur heard. No gallop. Pulmonary:      Effort: Pulmonary effort is normal. No respiratory distress. Breath sounds: Normal breath sounds. No wheezing or rales. Chest:      Chest wall: No tenderness. Abdominal:      General: Bowel sounds are normal. There is no distension. Palpations: Abdomen is soft. There is no mass. Tenderness: There is no abdominal tenderness. There is no right CVA tenderness, left CVA tenderness, guarding or rebound. Musculoskeletal:         General: No tenderness or deformity. Cervical back: Normal range of motion and neck supple. Right lower leg: No edema. Left lower leg: No edema. Comments: Right leg weakness and unable to walk on heal and toe on right. A lot of muscle spasm on right. Lymphadenopathy:      Cervical: No cervical adenopathy. Skin:     General: Skin is warm and dry. Coloration: Skin is not jaundiced or pale. Findings: No bruising, erythema or rash. Neurological:      Mental Status: She is alert and oriented to person, place, and time. Cranial Nerves: No cranial nerve deficit. Sensory: Sensory deficit present. Motor: No weakness or abnormal muscle tone. Gait: Gait normal.      Comments:              Psychiatric:         Mood and Affect: Mood normal.         Behavior: Behavior normal.         Thought Content:  Thought content

## 2023-12-12 ENCOUNTER — TELEPHONE (OUTPATIENT)
Dept: PRIMARY CARE CLINIC | Age: 58
End: 2023-12-12

## 2023-12-12 DIAGNOSIS — B37.31 VAGINAL CANDIDIASIS: ICD-10-CM

## 2023-12-12 DIAGNOSIS — J01.01 ACUTE RECURRENT MAXILLARY SINUSITIS: Primary | ICD-10-CM

## 2023-12-12 RX ORDER — FLUCONAZOLE 150 MG/1
150 TABLET ORAL ONCE
Qty: 1 TABLET | Refills: 0 | Status: SHIPPED | OUTPATIENT
Start: 2023-12-12 | End: 2023-12-12

## 2023-12-12 RX ORDER — DOXYCYCLINE HYCLATE 100 MG
100 TABLET ORAL 2 TIMES DAILY
Qty: 20 TABLET | Refills: 0 | Status: SHIPPED | OUTPATIENT
Start: 2023-12-12 | End: 2023-12-22

## 2023-12-12 NOTE — TELEPHONE ENCOUNTER
----- Message from Jamee Triana sent at 12/12/2023  2:23 PM EST -----  Regarding: Prescription   Contact: [phone]  How Dr. Urbano, I have not received my medication. I just called the pharmacy and they said they don’t carry azithromycin anymore and Saxenda is on back order and don’t know if they’re getting it in. Can you please order me a different antibiotic? I’m allergic to Penicillin. And another weight loss medication please. I’m trying to get my weight under control. She says they never received the order for the antibiotic. This sinus infection is getting worse. Lots of sinus drainage and eyes draining. Can you please let me know when you get this message. Thanks . Can you call me to let me know what is going on

## 2023-12-15 ENCOUNTER — HOSPITAL ENCOUNTER (OUTPATIENT)
Dept: ULTRASOUND IMAGING | Age: 58
Discharge: HOME | End: 2023-12-15
Payer: COMMERCIAL

## 2023-12-15 DIAGNOSIS — D70.8 OTHER NEUTROPENIA (HCC): ICD-10-CM

## 2023-12-15 DIAGNOSIS — R17 ELEVATED BILIRUBIN: ICD-10-CM

## 2023-12-15 DIAGNOSIS — R92.2 DENSE BREAST TISSUE ON MAMMOGRAM: ICD-10-CM

## 2023-12-15 LAB
ALBUMIN SERPL-MCNC: 4.8 G/DL (ref 3.4–5)
ALP SERPL-CCNC: 57 U/L (ref 40–129)
AST SERPL-CCNC: 28 U/L (ref 15–37)
BASOPHILS # BLD: 0 K/UL (ref 0–0.2)
BASOPHILS NFR BLD: 0.8 %
BILIRUB DIRECT SERPL-MCNC: <0.2 MG/DL (ref 0–0.3)
BILIRUB INDIRECT SERPL-MCNC: ABNORMAL MG/DL (ref 0–1)
BILIRUB SERPL-MCNC: 1.1 MG/DL (ref 0–1)
DEPRECATED RDW RBC AUTO: 13.8 % (ref 12.4–15.4)
EOSINOPHIL # BLD: 0.1 K/UL (ref 0–0.6)
EOSINOPHIL NFR BLD: 2.1 %
HCT VFR BLD AUTO: 43.6 % (ref 36–48)
HGB BLD-MCNC: 14.7 G/DL (ref 12–16)
LYMPHOCYTES # BLD: 1.3 K/UL (ref 1–5.1)
LYMPHOCYTES NFR BLD: 33 %
MCH RBC QN AUTO: 31.1 PG (ref 26–34)
MCHC RBC AUTO-ENTMCNC: 33.6 G/DL (ref 31–36)
MCV RBC AUTO: 92.5 FL (ref 80–100)
MONOCYTES # BLD: 0.4 K/UL (ref 0–1.3)
MONOCYTES NFR BLD: 9.1 %
NEUTROPHILS # BLD: 2.1 K/UL (ref 1.7–7.7)
NEUTROPHILS NFR BLD: 55 %
PLATELET # BLD AUTO: 219 K/UL (ref 135–450)
PMV BLD AUTO: 8.1 FL (ref 5–10.5)
PROT SERPL-MCNC: 6.7 G/DL (ref 6.4–8.2)
RBC # BLD AUTO: 4.72 M/UL (ref 4–5.2)
WBC # BLD AUTO: 3.8 K/UL (ref 4–11)

## 2023-12-15 PROCEDURE — 76641 ULTRASOUND BREAST COMPLETE: CPT

## 2023-12-17 DIAGNOSIS — N63.10 MASS OF RIGHT BREAST, UNSPECIFIED QUADRANT: Primary | ICD-10-CM

## 2023-12-17 PROBLEM — E80.4 GILBERT'S SYNDROME: Status: ACTIVE | Noted: 2023-12-17

## 2023-12-17 NOTE — RESULT ENCOUNTER NOTE
Normal liver blood test.  The mild elevation of the total bili is due to Gilbert's syndrome which is not a problem and there is no obstruction because a direct bilirubin level is normal.  Normal kidney blood tests and stable slightly reduced white blood cell count and no anemia.

## 2023-12-26 DIAGNOSIS — N63.41 SUBAREOLAR MASS OF RIGHT BREAST: Primary | ICD-10-CM

## 2023-12-29 ENCOUNTER — HOSPITAL ENCOUNTER (OUTPATIENT)
Dept: ULTRASOUND IMAGING | Age: 58
Discharge: HOME | End: 2023-12-29
Payer: COMMERCIAL

## 2023-12-29 DIAGNOSIS — N63.41 SUBAREOLAR MASS OF RIGHT BREAST: ICD-10-CM

## 2023-12-29 PROCEDURE — 76642 ULTRASOUND BREAST LIMITED: CPT

## 2024-01-08 ENCOUNTER — OFFICE VISIT (OUTPATIENT)
Dept: PRIMARY CARE CLINIC | Age: 59
End: 2024-01-08
Payer: COMMERCIAL

## 2024-01-08 VITALS
HEIGHT: 65 IN | HEART RATE: 55 BPM | OXYGEN SATURATION: 99 % | WEIGHT: 178 LBS | BODY MASS INDEX: 29.66 KG/M2 | DIASTOLIC BLOOD PRESSURE: 73 MMHG | SYSTOLIC BLOOD PRESSURE: 109 MMHG | TEMPERATURE: 97.5 F

## 2024-01-08 DIAGNOSIS — J30.89 OTHER ALLERGIC RHINITIS: ICD-10-CM

## 2024-01-08 DIAGNOSIS — L29.9 CHRONIC PRURITUS: Primary | ICD-10-CM

## 2024-01-08 DIAGNOSIS — M46.1 SACROILIITIS (HCC): ICD-10-CM

## 2024-01-08 DIAGNOSIS — M54.16 LUMBAR RADICULOPATHY: ICD-10-CM

## 2024-01-08 DIAGNOSIS — R63.5 WEIGHT GAIN: ICD-10-CM

## 2024-01-08 DIAGNOSIS — E55.9 VITAMIN D DEFICIENCY: ICD-10-CM

## 2024-01-08 DIAGNOSIS — R73.03 PREDIABETES: ICD-10-CM

## 2024-01-08 DIAGNOSIS — F51.01 PRIMARY INSOMNIA: ICD-10-CM

## 2024-01-08 PROBLEM — E11.9 TYPE 2 DIABETES MELLITUS (HCC): Status: RESOLVED | Noted: 2023-03-06 | Resolved: 2024-01-08

## 2024-01-08 PROCEDURE — 99214 OFFICE O/P EST MOD 30 MIN: CPT | Performed by: INTERNAL MEDICINE

## 2024-01-08 RX ORDER — MULTIVIT-MIN/IRON/FOLIC ACID/K 18-600-40
1 CAPSULE ORAL DAILY
Qty: 90 CAPSULE | Refills: 1 | Status: SHIPPED | OUTPATIENT
Start: 2024-01-08

## 2024-01-08 RX ORDER — CETIRIZINE HYDROCHLORIDE 10 MG/1
10 TABLET ORAL DAILY
Qty: 30 TABLET | Refills: 5 | Status: CANCELLED | OUTPATIENT
Start: 2024-01-08

## 2024-01-08 RX ORDER — PREGABALIN 25 MG/1
25 CAPSULE ORAL 3 TIMES DAILY
Qty: 90 CAPSULE | Refills: 2 | Status: SHIPPED | OUTPATIENT
Start: 2024-01-08 | End: 2024-04-07

## 2024-01-08 RX ORDER — LIRAGLUTIDE 6 MG/ML
0.6 INJECTION, SOLUTION SUBCUTANEOUS DAILY
Qty: 28 ADJUSTABLE DOSE PRE-FILLED PEN SYRINGE | Refills: 0 | Status: SHIPPED | OUTPATIENT
Start: 2024-01-08

## 2024-01-08 RX ORDER — FEXOFENADINE HCL 180 MG/1
180 TABLET ORAL DAILY
Qty: 30 TABLET | Refills: 5 | Status: SHIPPED | OUTPATIENT
Start: 2024-01-08 | End: 2024-07-06

## 2024-01-08 RX ORDER — TRAZODONE HYDROCHLORIDE 50 MG/1
50 TABLET ORAL NIGHTLY PRN
Qty: 30 TABLET | Refills: 5 | Status: SHIPPED | OUTPATIENT
Start: 2024-01-08

## 2024-01-08 ASSESSMENT — PATIENT HEALTH QUESTIONNAIRE - PHQ9
SUM OF ALL RESPONSES TO PHQ QUESTIONS 1-9: 0
SUM OF ALL RESPONSES TO PHQ QUESTIONS 1-9: 0
2. FEELING DOWN, DEPRESSED OR HOPELESS: 0
SUM OF ALL RESPONSES TO PHQ QUESTIONS 1-9: 0
SUM OF ALL RESPONSES TO PHQ9 QUESTIONS 1 & 2: 0
1. LITTLE INTEREST OR PLEASURE IN DOING THINGS: 0
SUM OF ALL RESPONSES TO PHQ QUESTIONS 1-9: 0

## 2024-01-08 ASSESSMENT — ENCOUNTER SYMPTOMS
RECTAL PAIN: 0
BLOOD IN STOOL: 0
SHORTNESS OF BREATH: 0
BACK PAIN: 1
ABDOMINAL PAIN: 0
CHOKING: 0
ANAL BLEEDING: 0
BLURRED VISION: 0
CONSTIPATION: 1
SWOLLEN GLANDS: 0
ABDOMINAL DISTENTION: 0
EYE PAIN: 0
CHANGE IN BOWEL HABIT: 0
PHOTOPHOBIA: 0
RHINORRHEA: 0
COUGH: 0
SORE THROAT: 0
NAUSEA: 0
VISUAL CHANGE: 0
ALLERGIC/IMMUNOLOGIC NEGATIVE: 1
VOMITING: 0
WHEEZING: 0
SINUS PRESSURE: 0
EYE REDNESS: 0

## 2024-01-08 NOTE — PROGRESS NOTES
Jamee Triana (:  1965) is a 58 y.o. female,Established patient, here for evaluation of the following chief complaint(s):  1 Month Follow-Up    Follow up on breast     Wt Readings from Last 3 Encounters:   24 80.7 kg (178 lb)   23 81.8 kg (180 lb 6.4 oz)   23 78.9 kg (174 lb)       ASSESSMENT/PLAN:  1. Chronic pruritus will change from Zyrtec to Allegra due to drowsiness with Zyrtec.  -     fexofenadine (ALLEGRA) 180 MG tablet; Take 1 tablet by mouth daily Stop zyrtec, Disp-30 tablet, R-5Normal  2. Vitamin D deficiency refill medication  Monitor vitamin D level.  -     Cholecalciferol (VITAMIN D) 50 MCG ( UT) CAPS capsule; Take 1 capsule by mouth daily, Disp-90 capsule, R-1Normal  3. Other allergic rhinitis controlled with Zyrtec but causing too much drowsiness switch to Allegra.  4. Lumbar radiculopathy  -     pregabalin (LYRICA) 25 MG capsule; Take 1 capsule by mouth 3 times daily for 90 days. Order clarification. Max Daily Amount: 75 mg, Disp-90 capsule, R-2Normal  5. Primary insomnia new problem for the past month start trazodone.  Lab Results   Component Value Date    TSH 1.50 2019    TSHREFLEX 1.28 2022    TSHFT4 2.06 2023     Thyroid screening is up-to-date.  No secondary causes noted. history and physical exam.  -     traZODone (DESYREL) 50 MG tablet; Take 1 tablet by mouth nightly as needed for Sleep, Disp-30 tablet, R-5Normal  6. Sacroiliitis (HCC)  Pain improved after injection for pain management.  7. Prediabetes controlled with diet and exericise.  Lab Results   Component Value Date    LABA1C 5.5 2023    LABA1C 5.3 2022    LABA1C 5.6 10/14/2020     Lab Results   Component Value Date    LDLCALC 104 (H) 2023    CREATININE 0.5 (L) 2023       8. Weight gain , struggling with maintaining weight with diet and exercise.  Start saxenda.   Goal weight 150 lb.  Current weight 178 lb.   -     liraglutide-weight management (SAXENDA) 18 MG/3ML

## 2024-01-18 DIAGNOSIS — R73.03 PREDIABETES: ICD-10-CM

## 2024-01-18 DIAGNOSIS — M54.17 RADICULOPATHY, LUMBOSACRAL REGION: ICD-10-CM

## 2024-01-18 DIAGNOSIS — M48.062 SPINAL STENOSIS OF LUMBAR REGION WITH NEUROGENIC CLAUDICATION: Primary | ICD-10-CM

## 2024-01-18 RX ORDER — LIRAGLUTIDE 6 MG/ML
0.6 INJECTION, SOLUTION SUBCUTANEOUS DAILY
Qty: 3 ML | Refills: 3 | Status: SHIPPED | OUTPATIENT
Start: 2024-01-18

## 2024-02-06 ENCOUNTER — OFFICE VISIT (OUTPATIENT)
Dept: PRIMARY CARE CLINIC | Age: 59
End: 2024-02-06
Payer: COMMERCIAL

## 2024-02-06 VITALS
RESPIRATION RATE: 16 BRPM | SYSTOLIC BLOOD PRESSURE: 110 MMHG | HEIGHT: 66 IN | OXYGEN SATURATION: 98 % | BODY MASS INDEX: 28.51 KG/M2 | TEMPERATURE: 97.7 F | DIASTOLIC BLOOD PRESSURE: 64 MMHG | HEART RATE: 73 BPM | WEIGHT: 177.4 LBS

## 2024-02-06 DIAGNOSIS — L50.9 HIVES: ICD-10-CM

## 2024-02-06 DIAGNOSIS — N95.1 PERI-MENOPAUSAL: ICD-10-CM

## 2024-02-06 LAB
BACTERIA URNS QL MICRO: NORMAL /HPF
BASOPHILS # BLD: 0 K/UL (ref 0–0.2)
BASOPHILS NFR BLD: 0.6 %
BILIRUB UR QL STRIP.AUTO: NEGATIVE
CLARITY UR: CLEAR
COLOR UR: YELLOW
DEPRECATED RDW RBC AUTO: 13.2 % (ref 12.4–15.4)
EOSINOPHIL # BLD: 0.1 K/UL (ref 0–0.6)
EOSINOPHIL NFR BLD: 1.7 %
EPI CELLS #/AREA URNS AUTO: 0 /HPF (ref 0–5)
GLUCOSE UR STRIP.AUTO-MCNC: NEGATIVE MG/DL
HCT VFR BLD AUTO: 41.7 % (ref 36–48)
HGB BLD-MCNC: 14.1 G/DL (ref 12–16)
HGB UR QL STRIP.AUTO: NEGATIVE
HYALINE CASTS #/AREA URNS AUTO: 0 /LPF (ref 0–8)
KETONES UR STRIP.AUTO-MCNC: NEGATIVE MG/DL
LEUKOCYTE ESTERASE UR QL STRIP.AUTO: NEGATIVE
LYMPHOCYTES # BLD: 1.4 K/UL (ref 1–5.1)
LYMPHOCYTES NFR BLD: 40.8 %
MCH RBC QN AUTO: 30.7 PG (ref 26–34)
MCHC RBC AUTO-ENTMCNC: 33.7 G/DL (ref 31–36)
MCV RBC AUTO: 91 FL (ref 80–100)
MONOCYTES # BLD: 0.2 K/UL (ref 0–1.3)
MONOCYTES NFR BLD: 6.7 %
NEUTROPHILS # BLD: 1.8 K/UL (ref 1.7–7.7)
NEUTROPHILS NFR BLD: 50.2 %
NITRITE UR QL STRIP.AUTO: NEGATIVE
PH UR STRIP.AUTO: 7 [PH] (ref 5–8)
PLATELET # BLD AUTO: 241 K/UL (ref 135–450)
PMV BLD AUTO: 8.3 FL (ref 5–10.5)
PROT UR STRIP.AUTO-MCNC: NEGATIVE MG/DL
RBC # BLD AUTO: 4.58 M/UL (ref 4–5.2)
RBC CLUMPS #/AREA URNS AUTO: 1 /HPF (ref 0–4)
SP GR UR STRIP.AUTO: 1.01 (ref 1–1.03)
UA DIPSTICK W REFLEX MICRO PNL UR: NORMAL
URN SPEC COLLECT METH UR: NORMAL
UROBILINOGEN UR STRIP-ACNC: 1 E.U./DL
WBC # BLD AUTO: 3.5 K/UL (ref 4–11)
WBC #/AREA URNS AUTO: 0 /HPF (ref 0–5)

## 2024-02-06 PROCEDURE — 99213 OFFICE O/P EST LOW 20 MIN: CPT | Performed by: INTERNAL MEDICINE

## 2024-02-06 RX ORDER — FAMOTIDINE 20 MG/1
20 TABLET, FILM COATED ORAL 2 TIMES DAILY
Qty: 60 TABLET | Refills: 3 | Status: SHIPPED | OUTPATIENT
Start: 2024-02-06

## 2024-02-06 RX ORDER — PREDNISONE 10 MG/1
10 TABLET ORAL DAILY
Qty: 10 TABLET | Refills: 0 | Status: SHIPPED | OUTPATIENT
Start: 2024-02-06 | End: 2024-02-16

## 2024-02-06 RX ORDER — EPINEPHRINE 0.3 MG/.3ML
0.3 INJECTION SUBCUTANEOUS ONCE
Qty: 1 EACH | Refills: 0 | Status: SHIPPED | OUTPATIENT
Start: 2024-02-06 | End: 2024-02-06

## 2024-02-06 NOTE — PROGRESS NOTES
Jamee Triana (:  1965) is a 58 y.o. female,Established patient, here for evaluation of the following chief complaint(s):  Other (Itching all over//)         ASSESSMENT/PLAN:  1. Hives for the past 3 days patient started itching all over and her skin is dry and has areas of redness all over back arms abdomen legs.  Reviewed allergy testing she had in the past and she is allergic to nuts since she has had some peanut butter crackers before this started.  Patient will avoid nuts.  Symptoms are severe and did not resolve.  Patient is to continue Allegra 180 mg daily and add famotidine 20 mg twice a day and give a short course of prednisone.  Due to the severity of symptoms patient was given an allergy referral prior to visit and she will call for an appointment to find out what is triggering this.  Also need to check kidney and liver function test and urinalysis sed rate.  -     EPINEPHrine (EPIPEN 2-DAMIAN) 0.3 MG/0.3ML SOAJ injection; Inject 0.3 mLs into the muscle once for 1 dose Use as directed for allergic reaction, Disp-1 each, R-0Normal  -     Comprehensive Metabolic Panel; Future  -     CBC with Auto Differential; Future  -     Urinalysis with Microscopic; Future  -     Sedimentation Rate; Future  -     famotidine (PEPCID) 20 MG tablet; Take 1 tablet by mouth 2 times daily, Disp-60 tablet, R-3Normal  -     predniSONE (DELTASONE) 10 MG tablet; Take 1 tablet by mouth daily for 10 days, Disp-10 tablet, R-0Normal  2. Amber-menopausal, symptoms controlled with Estratest.  -     estrogens, conjugated,-methylTESTOSTERone (EST ESTROGENS-METHYLTEST HS) 0.625-1.25 MG per tablet; Take 1 tablet by mouth daily., Disp-90 tablet, R-0Patient doing a slow cross over from 1.25-2.5 EE-MT to 0.625-1.25, by substituting a single low dose tablet starting 1 day a week, increasing by 1 day/week every 2 weeks, achieving conversion in 3 mosNormal      Return in about 4 weeks (around 3/5/2024) for itching.         Subjective

## 2024-02-07 DIAGNOSIS — L50.9 HIVES: Primary | ICD-10-CM

## 2024-02-07 LAB
ALBUMIN SERPL-MCNC: 4.6 G/DL (ref 3.4–5)
ALBUMIN/GLOB SERPL: 2.7 {RATIO} (ref 1.1–2.2)
ALP SERPL-CCNC: 49 U/L (ref 40–129)
ALT SERPL-CCNC: 16 U/L (ref 10–40)
ANION GAP SERPL CALCULATED.3IONS-SCNC: 6 MMOL/L (ref 3–16)
AST SERPL-CCNC: 18 U/L (ref 15–37)
BILIRUB SERPL-MCNC: 1.5 MG/DL (ref 0–1)
BUN SERPL-MCNC: 10 MG/DL (ref 7–20)
CALCIUM SERPL-MCNC: 8.7 MG/DL (ref 8.3–10.6)
CHLORIDE SERPL-SCNC: 104 MMOL/L (ref 99–110)
CO2 SERPL-SCNC: 31 MMOL/L (ref 21–32)
CREAT SERPL-MCNC: <0.5 MG/DL (ref 0.6–1.1)
GFR SERPLBLD CREATININE-BSD FMLA CKD-EPI: >60 ML/MIN/{1.73_M2}
GLUCOSE SERPL-MCNC: 85 MG/DL (ref 70–99)
POTASSIUM SERPL-SCNC: 4.1 MMOL/L (ref 3.5–5.1)
PROT SERPL-MCNC: 6.3 G/DL (ref 6.4–8.2)
SODIUM SERPL-SCNC: 141 MMOL/L (ref 136–145)

## 2024-02-07 RX ORDER — METHYLPREDNISOLONE 4 MG/1
TABLET ORAL
Qty: 1 KIT | Refills: 0 | Status: SHIPPED | OUTPATIENT
Start: 2024-02-07 | End: 2024-02-13

## 2024-02-07 ASSESSMENT — ENCOUNTER SYMPTOMS
VOMITING: 0
ABDOMINAL PAIN: 0
SWOLLEN GLANDS: 0
RHINORRHEA: 0
ANAL BLEEDING: 0
CHANGE IN BOWEL HABIT: 0
EYE REDNESS: 0
SINUS PRESSURE: 0
COUGH: 0
PHOTOPHOBIA: 0
RECTAL PAIN: 0
NAUSEA: 0
EYE PAIN: 0
SORE THROAT: 0
SHORTNESS OF BREATH: 0
WHEEZING: 0
BLOOD IN STOOL: 0
ABDOMINAL DISTENTION: 0
CHOKING: 0
ALLERGIC/IMMUNOLOGIC NEGATIVE: 1
CONSTIPATION: 0
VISUAL CHANGE: 0
BACK PAIN: 1

## 2024-02-07 NOTE — RESULT ENCOUNTER NOTE
Normal sodium and potassium.  Normal kidney function test.  Normal liver test.  Chronically intermittent elevated bilirubin due to Gilbert's syndrome which is hereditary and does not cause problems.  Normal urinalysis.  Schedule follow-up visit in a month to review pain slightly reduced white blood cell count that is stable.  Normal hemoglobin and platelet count.

## 2024-02-29 DIAGNOSIS — M48.062 SPINAL STENOSIS OF LUMBAR REGION WITH NEUROGENIC CLAUDICATION: ICD-10-CM

## 2024-02-29 DIAGNOSIS — M54.17 RADICULOPATHY, LUMBOSACRAL REGION: ICD-10-CM

## 2024-02-29 DIAGNOSIS — R73.03 PREDIABETES: ICD-10-CM

## 2024-02-29 RX ORDER — LIRAGLUTIDE 6 MG/ML
0.6 INJECTION, SOLUTION SUBCUTANEOUS DAILY
Qty: 3 ML | Refills: 3 | Status: SHIPPED | OUTPATIENT
Start: 2024-02-29

## 2024-02-29 NOTE — TELEPHONE ENCOUNTER
Medication:   Requested Prescriptions     Pending Prescriptions Disp Refills    liraglutide-weight management (SAXENDA) 18 MG/3ML SOPN 3 mL 3     Sig: Inject 0.6 mg into the skin daily        Last Filled:      Patient Phone Number: [phone] (home)     Last appt: 2/6/2024   Next appt: 3/11/2024    Last OARRS:        No data to display

## 2024-03-05 ENCOUNTER — TELEPHONE (OUTPATIENT)
Dept: ADMINISTRATIVE | Age: 59
End: 2024-03-05

## 2024-03-05 NOTE — TELEPHONE ENCOUNTER
The medication is APPROVED THRU 03/05/2025    If this requires a response please respond to the pool ( P [facility] MEDICATION PRE-AUTH).      Thank you please advise patient.

## 2024-03-05 NOTE — TELEPHONE ENCOUNTER
Submitted PA for liraglutide-weight management (SAXENDA) 18 MG/3ML SOPN   Via CM (Key: [id number]) STATUS: PENDING.    Follow up done daily; if no decision with in three days we will refax.  If another three days goes by with no decision will call the insurance for status.

## 2024-03-11 ENCOUNTER — OFFICE VISIT (OUTPATIENT)
Dept: PRIMARY CARE CLINIC | Age: 59
End: 2024-03-11
Payer: COMMERCIAL

## 2024-03-11 VITALS
WEIGHT: 182.2 LBS | OXYGEN SATURATION: 98 % | DIASTOLIC BLOOD PRESSURE: 60 MMHG | HEART RATE: 74 BPM | BODY MASS INDEX: 29.28 KG/M2 | TEMPERATURE: 98.2 F | SYSTOLIC BLOOD PRESSURE: 112 MMHG | HEIGHT: 66 IN | RESPIRATION RATE: 16 BRPM

## 2024-03-11 DIAGNOSIS — L29.9 CHRONIC PRURITUS: ICD-10-CM

## 2024-03-11 DIAGNOSIS — E55.9 VITAMIN D DEFICIENCY: ICD-10-CM

## 2024-03-11 DIAGNOSIS — E89.41 SYMPTOMATIC POSTSURGICAL MENOPAUSE: ICD-10-CM

## 2024-03-11 PROCEDURE — 99214 OFFICE O/P EST MOD 30 MIN: CPT | Performed by: INTERNAL MEDICINE

## 2024-03-11 RX ORDER — MULTIVIT-MIN/IRON/FOLIC ACID/K 18-600-40
1 CAPSULE ORAL DAILY
Qty: 90 CAPSULE | Refills: 1 | Status: SHIPPED | OUTPATIENT
Start: 2024-03-11

## 2024-03-11 RX ORDER — FEXOFENADINE HCL 180 MG/1
180 TABLET ORAL DAILY
Qty: 30 TABLET | Refills: 5 | Status: SHIPPED | OUTPATIENT
Start: 2024-03-11 | End: 2024-09-07

## 2024-03-11 SDOH — ECONOMIC STABILITY: INCOME INSECURITY: HOW HARD IS IT FOR YOU TO PAY FOR THE VERY BASICS LIKE FOOD, HOUSING, MEDICAL CARE, AND HEATING?: NOT HARD AT ALL

## 2024-03-11 SDOH — ECONOMIC STABILITY: FOOD INSECURITY: WITHIN THE PAST 12 MONTHS, THE FOOD YOU BOUGHT JUST DIDN'T LAST AND YOU DIDN'T HAVE MONEY TO GET MORE.: NEVER TRUE

## 2024-03-11 SDOH — ECONOMIC STABILITY: FOOD INSECURITY: WITHIN THE PAST 12 MONTHS, YOU WORRIED THAT YOUR FOOD WOULD RUN OUT BEFORE YOU GOT MONEY TO BUY MORE.: NEVER TRUE

## 2024-03-11 ASSESSMENT — ENCOUNTER SYMPTOMS
ABDOMINAL PAIN: 0
SHORTNESS OF BREATH: 0
RECTAL PAIN: 0
EYE REDNESS: 0
EYE PAIN: 0
SORE THROAT: 0
RHINORRHEA: 0
CONSTIPATION: 0
VOMITING: 0
CHOKING: 0
BACK PAIN: 1
PHOTOPHOBIA: 0
ABDOMINAL DISTENTION: 0
NAUSEA: 0
WHEEZING: 0
ANAL BLEEDING: 0
SINUS PRESSURE: 0
COUGH: 0
ALLERGIC/IMMUNOLOGIC NEGATIVE: 1
BLOOD IN STOOL: 0

## 2024-03-11 ASSESSMENT — PATIENT HEALTH QUESTIONNAIRE - PHQ9
1. LITTLE INTEREST OR PLEASURE IN DOING THINGS: 0
SUM OF ALL RESPONSES TO PHQ QUESTIONS 1-9: 0
SUM OF ALL RESPONSES TO PHQ9 QUESTIONS 1 & 2: 0
2. FEELING DOWN, DEPRESSED OR HOPELESS: 0

## 2024-03-11 NOTE — PROGRESS NOTES
person, place, and time.      Cranial Nerves: No cranial nerve deficit.      Sensory: Sensory deficit present.      Motor: No weakness or abnormal muscle tone.      Gait: Gait normal.      Comments:              Psychiatric:         Mood and Affect: Mood normal.         Behavior: Behavior normal.         Thought Content: Thought content normal.         Judgment: Judgment normal.                An electronic signature was used to authenticate this note.    --LADONNA MILLER MD

## 2024-03-18 DIAGNOSIS — J02.9 PHARYNGITIS, UNSPECIFIED ETIOLOGY: Primary | ICD-10-CM

## 2024-03-18 RX ORDER — AZITHROMYCIN 250 MG/1
TABLET, FILM COATED ORAL
Qty: 6 TABLET | Refills: 0 | Status: SHIPPED | OUTPATIENT
Start: 2024-03-18 | End: 2024-03-28

## 2024-04-19 DIAGNOSIS — M48.062 SPINAL STENOSIS OF LUMBAR REGION WITH NEUROGENIC CLAUDICATION: ICD-10-CM

## 2024-04-22 ENCOUNTER — OFFICE VISIT (OUTPATIENT)
Dept: PRIMARY CARE CLINIC | Age: 59
End: 2024-04-22
Payer: COMMERCIAL

## 2024-04-22 VITALS
DIASTOLIC BLOOD PRESSURE: 64 MMHG | TEMPERATURE: 97.2 F | OXYGEN SATURATION: 98 % | SYSTOLIC BLOOD PRESSURE: 120 MMHG | BODY MASS INDEX: 29.99 KG/M2 | HEART RATE: 72 BPM | RESPIRATION RATE: 16 BRPM | HEIGHT: 65 IN | WEIGHT: 180 LBS

## 2024-04-22 DIAGNOSIS — K21.00 GASTROESOPHAGEAL REFLUX DISEASE WITH ESOPHAGITIS WITHOUT HEMORRHAGE: ICD-10-CM

## 2024-04-22 DIAGNOSIS — L29.9 CHRONIC PRURITUS: ICD-10-CM

## 2024-04-22 DIAGNOSIS — L50.9 HIVES: ICD-10-CM

## 2024-04-22 DIAGNOSIS — M54.16 LUMBAR RADICULOPATHY: ICD-10-CM

## 2024-04-22 PROCEDURE — 99214 OFFICE O/P EST MOD 30 MIN: CPT | Performed by: INTERNAL MEDICINE

## 2024-04-22 RX ORDER — FEXOFENADINE HCL 180 MG/1
180 TABLET ORAL DAILY
Qty: 30 TABLET | Refills: 5 | Status: SHIPPED | OUTPATIENT
Start: 2024-04-22 | End: 2024-10-19

## 2024-04-22 RX ORDER — PREGABALIN 25 MG/1
25 CAPSULE ORAL 3 TIMES DAILY
Qty: 90 CAPSULE | Refills: 2 | Status: SHIPPED | OUTPATIENT
Start: 2024-04-22 | End: 2024-07-21

## 2024-04-22 RX ORDER — LANSOPRAZOLE 30 MG/1
CAPSULE, DELAYED RELEASE ORAL
Qty: 90 CAPSULE | Refills: 3 | Status: CANCELLED | OUTPATIENT
Start: 2024-04-22

## 2024-04-22 RX ORDER — FAMOTIDINE 20 MG/1
20 TABLET, FILM COATED ORAL 2 TIMES DAILY
Qty: 60 TABLET | Refills: 5 | Status: SHIPPED | OUTPATIENT
Start: 2024-04-22

## 2024-04-22 RX ORDER — PHENTERMINE HYDROCHLORIDE 15 MG/1
15 CAPSULE ORAL EVERY MORNING
Qty: 30 CAPSULE | Refills: 0 | Status: SHIPPED | OUTPATIENT
Start: 2024-04-22 | End: 2024-05-22

## 2024-04-22 ASSESSMENT — PATIENT HEALTH QUESTIONNAIRE - PHQ9
SUM OF ALL RESPONSES TO PHQ9 QUESTIONS 1 & 2: 0
2. FEELING DOWN, DEPRESSED OR HOPELESS: NOT AT ALL
SUM OF ALL RESPONSES TO PHQ QUESTIONS 1-9: 0
1. LITTLE INTEREST OR PLEASURE IN DOING THINGS: NOT AT ALL

## 2024-04-22 NOTE — PROGRESS NOTES
pain, anorexia, arthralgias, chest pain, chills, congestion, coughing, diaphoresis, fatigue, fever, headaches, joint swelling, myalgias, nausea, neck pain, numbness, rash, sore throat, vomiting or weakness. Exacerbated by: djd, no longer able to exercise. Treatments tried: diet. The treatment provided no relief.   Back Pain  This is a chronic problem. The current episode started more than 1 year ago. The problem occurs constantly. The problem has been waxing and waning since onset. The pain is present in the lumbar spine. The quality of the pain is described as aching and burning. The pain radiates to the left thigh. The pain is at a severity of 6/10. The pain is moderate. The pain is The same all the time. The symptoms are aggravated by bending, twisting and standing. Stiffness is present All day. Associated symptoms include paresthesias and tingling. Pertinent negatives include no abdominal pain, bladder incontinence, bowel incontinence, chest pain, dysuria, fever, headaches, numbness, pelvic pain or weakness. Treatments tried: lyrica and back protective behavior. The treatment provided mild relief.       Review of Systems   Constitutional:  Negative for chills, diaphoresis, fatigue and fever.   HENT:  Negative for congestion, postnasal drip, rhinorrhea, sinus pressure and sore throat.    Eyes:  Negative for photophobia, pain and redness.   Respiratory:  Negative for cough, choking, shortness of breath and wheezing.    Cardiovascular:  Negative for chest pain and leg swelling.   Gastrointestinal:  Negative for abdominal distention, abdominal pain, anal bleeding, anorexia, blood in stool, bowel incontinence, constipation, nausea, rectal pain and vomiting.   Endocrine: Negative.  Negative for polydipsia, polyphagia and polyuria.   Genitourinary: Negative.  Negative for bladder incontinence, dysuria and pelvic pain.   Musculoskeletal:  Positive for back pain. Negative for arthralgias, joint swelling, myalgias and

## 2024-04-26 ASSESSMENT — ENCOUNTER SYMPTOMS
SORE THROAT: 0
ALLERGIC/IMMUNOLOGIC NEGATIVE: 1
ABDOMINAL DISTENTION: 0
ANAL BLEEDING: 0
SINUS PRESSURE: 0
CHOKING: 0
CONSTIPATION: 0
PHOTOPHOBIA: 0
ABDOMINAL PAIN: 0
BOWEL INCONTINENCE: 0
VOMITING: 0
EYE REDNESS: 0
COUGH: 0
NAUSEA: 0
BACK PAIN: 1
RHINORRHEA: 0
EYE PAIN: 0
SHORTNESS OF BREATH: 0
WHEEZING: 0
BLOOD IN STOOL: 0

## 2024-05-24 ENCOUNTER — PATIENT MESSAGE (OUTPATIENT)
Dept: PRIMARY CARE CLINIC | Age: 59
End: 2024-05-24

## 2024-05-24 NOTE — TELEPHONE ENCOUNTER
Medication:   Requested Prescriptions     Pending Prescriptions Disp Refills    phentermine 15 MG capsule 30 capsule 0     Sig: Take 1 capsule by mouth every morning for 30 days. Max Daily Amount: 15 mg        Last Filled:      Patient Phone Number: [phone] (home)     Last appt: 4/22/2024   Next appt: 5/28/2024    Last OARRS:        No data to display

## 2024-05-24 NOTE — TELEPHONE ENCOUNTER
From: Jamee Triana  To: Dr. Candace Urbano  Sent: 5/24/2024 2:35 PM EDT  Subject: Refill    Hi Doctor Sundeep I don’t have any refills on my Phentermine. I have two pills left. It’s ok not really any results . Weight same no gain.   I need something that will work ASAP! I’m getting anxious and sad . I try hard.

## 2024-05-25 RX ORDER — PHENTERMINE HYDROCHLORIDE 15 MG/1
15 CAPSULE ORAL EVERY MORNING
Qty: 30 CAPSULE | Refills: 0 | Status: SHIPPED | OUTPATIENT
Start: 2024-05-25 | End: 2024-06-24

## 2024-05-28 ENCOUNTER — OFFICE VISIT (OUTPATIENT)
Dept: PRIMARY CARE CLINIC | Age: 59
End: 2024-05-28
Payer: COMMERCIAL

## 2024-05-28 VITALS
DIASTOLIC BLOOD PRESSURE: 60 MMHG | SYSTOLIC BLOOD PRESSURE: 104 MMHG | HEIGHT: 66 IN | TEMPERATURE: 97.4 F | OXYGEN SATURATION: 98 % | HEART RATE: 72 BPM | RESPIRATION RATE: 16 BRPM | WEIGHT: 177.6 LBS | BODY MASS INDEX: 28.54 KG/M2

## 2024-05-28 DIAGNOSIS — Z78.0 MENOPAUSE: Primary | ICD-10-CM

## 2024-05-28 DIAGNOSIS — L29.9 CHRONIC PRURITUS: ICD-10-CM

## 2024-05-28 DIAGNOSIS — J45.20 MILD INTERMITTENT ASTHMA WITHOUT COMPLICATION: ICD-10-CM

## 2024-05-28 DIAGNOSIS — G43.711 INTRACTABLE CHRONIC MIGRAINE WITHOUT AURA AND WITH STATUS MIGRAINOSUS: ICD-10-CM

## 2024-05-28 DIAGNOSIS — L20.84 INTRINSIC ECZEMA: ICD-10-CM

## 2024-05-28 DIAGNOSIS — M48.061 SPINAL STENOSIS OF LUMBAR REGION WITHOUT NEUROGENIC CLAUDICATION: ICD-10-CM

## 2024-05-28 PROCEDURE — 99214 OFFICE O/P EST MOD 30 MIN: CPT | Performed by: INTERNAL MEDICINE

## 2024-05-28 RX ORDER — ALBUTEROL SULFATE 90 UG/1
2 AEROSOL, METERED RESPIRATORY (INHALATION) EVERY 6 HOURS PRN
Qty: 18 G | Refills: 5 | Status: SHIPPED | OUTPATIENT
Start: 2024-05-28

## 2024-05-28 RX ORDER — PHENTERMINE HYDROCHLORIDE 30 MG/1
30 CAPSULE ORAL EVERY MORNING
Qty: 30 CAPSULE | Refills: 0 | Status: SHIPPED | OUTPATIENT
Start: 2024-05-28 | End: 2024-06-27

## 2024-05-28 RX ORDER — MAGNESIUM OXIDE 400 MG/1
400 TABLET ORAL DAILY
Qty: 30 TABLET | Refills: 12 | Status: SHIPPED | OUTPATIENT
Start: 2024-05-28

## 2024-05-28 RX ORDER — BACLOFEN 10 MG/1
10 TABLET ORAL 3 TIMES DAILY
Qty: 90 TABLET | Refills: 3 | Status: SHIPPED | OUTPATIENT
Start: 2024-05-28

## 2024-05-28 RX ORDER — FEXOFENADINE HCL 180 MG/1
180 TABLET ORAL DAILY
Qty: 30 TABLET | Refills: 5 | Status: SHIPPED | OUTPATIENT
Start: 2024-05-28 | End: 2024-11-24

## 2024-05-28 ASSESSMENT — PATIENT HEALTH QUESTIONNAIRE - PHQ9
2. FEELING DOWN, DEPRESSED OR HOPELESS: NOT AT ALL
SUM OF ALL RESPONSES TO PHQ QUESTIONS 1-9: 0
SUM OF ALL RESPONSES TO PHQ QUESTIONS 1-9: 0
SUM OF ALL RESPONSES TO PHQ9 QUESTIONS 1 & 2: 0
1. LITTLE INTEREST OR PLEASURE IN DOING THINGS: NOT AT ALL
SUM OF ALL RESPONSES TO PHQ QUESTIONS 1-9: 0
SUM OF ALL RESPONSES TO PHQ QUESTIONS 1-9: 0

## 2024-05-28 NOTE — PROGRESS NOTES
Sensory: Sensory deficit present.      Motor: No weakness or abnormal muscle tone.      Gait: Gait normal.      Comments:              Psychiatric:         Mood and Affect: Mood normal.         Behavior: Behavior normal.         Thought Content: Thought content normal.         Judgment: Judgment normal.                An electronic signature was used to authenticate this note.    --LADONNA MILLER MD

## 2024-05-31 ENCOUNTER — TELEPHONE (OUTPATIENT)
Dept: PRIMARY CARE CLINIC | Age: 59
End: 2024-05-31

## 2024-05-31 DIAGNOSIS — L20.84 INTRINSIC ECZEMA: ICD-10-CM

## 2024-05-31 ASSESSMENT — ENCOUNTER SYMPTOMS
CHEST TIGHTNESS: 0
BACK PAIN: 1
DIFFICULTY BREATHING: 0
VOMITING: 0
HEMOPTYSIS: 0
PHOTOPHOBIA: 0
FREQUENT THROAT CLEARING: 0
VISUAL CHANGE: 0
HOARSE VOICE: 0
CHANGE IN BOWEL HABIT: 0
HEARTBURN: 0
SHORTNESS OF BREATH: 0
WHEEZING: 0
CONSTIPATION: 0
SORE THROAT: 0
ABDOMINAL PAIN: 0
COUGH: 0
ABDOMINAL DISTENTION: 0
SWOLLEN GLANDS: 0
ALLERGIC/IMMUNOLOGIC NEGATIVE: 1
RECTAL PAIN: 0
EYE REDNESS: 0
SINUS PRESSURE: 0
BLOOD IN STOOL: 0
EYE PAIN: 0
SPUTUM PRODUCTION: 0
NAUSEA: 0
ANAL BLEEDING: 0
RHINORRHEA: 0
CHOKING: 0
TROUBLE SWALLOWING: 0

## 2024-06-11 ENCOUNTER — HOSPITAL ENCOUNTER (OUTPATIENT)
Dept: MRI IMAGING | Age: 59
Discharge: HOME | End: 2024-06-11
Payer: COMMERCIAL

## 2024-06-11 DIAGNOSIS — M96.1 POSTLAMINECTOMY SYNDROME, CERVICAL REGION: ICD-10-CM

## 2024-06-11 DIAGNOSIS — M54.16 LUMBAR RADICULOPATHY: ICD-10-CM

## 2024-06-11 PROCEDURE — 6360000004 HC RX CONTRAST MEDICATION: Performed by: NURSE PRACTITIONER

## 2024-06-11 PROCEDURE — 72158 MRI LUMBAR SPINE W/O & W/DYE: CPT

## 2024-06-11 PROCEDURE — A9579 GAD-BASE MR CONTRAST NOS,1ML: HCPCS | Performed by: NURSE PRACTITIONER

## 2024-06-11 RX ADMIN — GADOTERIDOL 16 ML: 279.3 INJECTION, SOLUTION INTRAVENOUS at 07:27

## 2024-06-13 DIAGNOSIS — M54.31 SCIATICA, RIGHT SIDE: Primary | ICD-10-CM

## 2024-06-13 RX ORDER — METHYLPREDNISOLONE 4 MG/1
TABLET ORAL
Qty: 1 KIT | Refills: 0 | Status: SHIPPED | OUTPATIENT
Start: 2024-06-13 | End: 2024-06-19

## 2024-06-17 DIAGNOSIS — Z78.0 MENOPAUSE: ICD-10-CM

## 2024-06-17 RX ORDER — PHENTERMINE HYDROCHLORIDE 30 MG/1
30 CAPSULE ORAL EVERY MORNING
Qty: 30 CAPSULE | Refills: 0 | Status: SHIPPED | OUTPATIENT
Start: 2024-06-17 | End: 2024-07-17

## 2024-06-17 NOTE — TELEPHONE ENCOUNTER
Medication:   Requested Prescriptions     Pending Prescriptions Disp Refills    estrogens, conjugated,-methylTESTOSTERone (EST ESTROGENS-METHYLTEST HS) 0.625-1.25 MG per tablet 90 tablet 0     Sig: Take 1 tablet by mouth daily.    phentermine 30 MG capsule 30 capsule 0     Sig: Take 1 capsule by mouth every morning for 30 days. Max Daily Amount: 30 mg        Last Filled:      Patient Phone Number: [phone] (home)     Last appt: 5/28/2024   Next appt: Visit date not found    Last OARRS:        No data to display

## 2024-06-18 ENCOUNTER — TELEPHONE (OUTPATIENT)
Dept: ADMINISTRATIVE | Age: 59
End: 2024-06-18

## 2024-06-19 NOTE — TELEPHONE ENCOUNTER
The medication was DENIED. No letter available in [facility]. Will wait for faxed denial letter. Will upload once received.

## 2024-06-24 NOTE — TELEPHONE ENCOUNTER
This medication was denied by insurance. Please see letter for denial reasons.    If this requires a response please respond to the pool ( P [facility] MEDICATION PRE-AUTH).      Thank you please advise patient.

## 2024-06-27 RX ORDER — PHENTERMINE HYDROCHLORIDE 37.5 MG/1
37.5 TABLET ORAL
Qty: 30 TABLET | Refills: 0 | Status: SHIPPED | OUTPATIENT
Start: 2024-06-27 | End: 2024-07-27

## 2024-07-30 DIAGNOSIS — J20.9 ACUTE BRONCHITIS, UNSPECIFIED ORGANISM: Primary | ICD-10-CM

## 2024-07-30 RX ORDER — DOXYCYCLINE 100 MG/1
100 CAPSULE ORAL 2 TIMES DAILY
Qty: 20 CAPSULE | Refills: 0 | Status: SHIPPED | OUTPATIENT
Start: 2024-07-30 | End: 2024-08-09

## 2024-07-30 NOTE — PROGRESS NOTES
Spoke with patient and instructed to come in tomorrow at 2: 40.  Patient will do home COVID test tonight.  With the yellow-green phlegm and cough for 5 days send in doxycycline and also will renew  FMLA paperwork an appointment tomorrow

## 2024-07-31 ENCOUNTER — OFFICE VISIT (OUTPATIENT)
Dept: PRIMARY CARE CLINIC | Age: 59
End: 2024-07-31
Payer: COMMERCIAL

## 2024-07-31 VITALS
HEIGHT: 65 IN | TEMPERATURE: 97.5 F | SYSTOLIC BLOOD PRESSURE: 114 MMHG | HEART RATE: 77 BPM | DIASTOLIC BLOOD PRESSURE: 72 MMHG | OXYGEN SATURATION: 99 % | WEIGHT: 177 LBS | BODY MASS INDEX: 29.49 KG/M2

## 2024-07-31 DIAGNOSIS — R49.0 CHRONIC HOARSENESS: ICD-10-CM

## 2024-07-31 DIAGNOSIS — Z78.0 MENOPAUSE: ICD-10-CM

## 2024-07-31 DIAGNOSIS — B37.0 THRUSH: ICD-10-CM

## 2024-07-31 DIAGNOSIS — J20.8 ACUTE BACTERIAL BRONCHITIS: ICD-10-CM

## 2024-07-31 DIAGNOSIS — L50.9 HIVES: ICD-10-CM

## 2024-07-31 DIAGNOSIS — L29.9 CHRONIC PRURITUS: ICD-10-CM

## 2024-07-31 DIAGNOSIS — J30.89 OTHER ALLERGIC RHINITIS: Primary | ICD-10-CM

## 2024-07-31 DIAGNOSIS — B96.89 ACUTE BACTERIAL BRONCHITIS: ICD-10-CM

## 2024-07-31 PROCEDURE — 99214 OFFICE O/P EST MOD 30 MIN: CPT | Performed by: INTERNAL MEDICINE

## 2024-07-31 RX ORDER — CLOTRIMAZOLE 10 MG/1
10 LOZENGE ORAL
Qty: 50 TABLET | Refills: 0 | Status: SHIPPED | OUTPATIENT
Start: 2024-07-31 | End: 2024-08-10

## 2024-07-31 RX ORDER — FAMOTIDINE 20 MG/1
20 TABLET, FILM COATED ORAL 2 TIMES DAILY
Qty: 60 TABLET | Refills: 5 | Status: SHIPPED | OUTPATIENT
Start: 2024-07-31

## 2024-07-31 RX ORDER — AZELASTINE 1 MG/ML
1 SPRAY, METERED NASAL 2 TIMES DAILY
Qty: 30 ML | Refills: 5 | Status: SHIPPED | OUTPATIENT
Start: 2024-07-31

## 2024-07-31 RX ORDER — FEXOFENADINE HCL 180 MG/1
180 TABLET ORAL DAILY
Qty: 30 TABLET | Refills: 5 | Status: SHIPPED | OUTPATIENT
Start: 2024-07-31 | End: 2025-01-27

## 2024-07-31 SDOH — ECONOMIC STABILITY: FOOD INSECURITY: WITHIN THE PAST 12 MONTHS, THE FOOD YOU BOUGHT JUST DIDN'T LAST AND YOU DIDN'T HAVE MONEY TO GET MORE.: NEVER TRUE

## 2024-07-31 SDOH — ECONOMIC STABILITY: FOOD INSECURITY: WITHIN THE PAST 12 MONTHS, YOU WORRIED THAT YOUR FOOD WOULD RUN OUT BEFORE YOU GOT MONEY TO BUY MORE.: NEVER TRUE

## 2024-07-31 SDOH — ECONOMIC STABILITY: INCOME INSECURITY: HOW HARD IS IT FOR YOU TO PAY FOR THE VERY BASICS LIKE FOOD, HOUSING, MEDICAL CARE, AND HEATING?: NOT VERY HARD

## 2024-07-31 ASSESSMENT — PATIENT HEALTH QUESTIONNAIRE - PHQ9
SUM OF ALL RESPONSES TO PHQ QUESTIONS 1-9: 0
1. LITTLE INTEREST OR PLEASURE IN DOING THINGS: NOT AT ALL
SUM OF ALL RESPONSES TO PHQ QUESTIONS 1-9: 0
SUM OF ALL RESPONSES TO PHQ9 QUESTIONS 1 & 2: 0
SUM OF ALL RESPONSES TO PHQ QUESTIONS 1-9: 0
SUM OF ALL RESPONSES TO PHQ QUESTIONS 1-9: 0
2. FEELING DOWN, DEPRESSED OR HOPELESS: NOT AT ALL

## 2024-07-31 NOTE — PROGRESS NOTES
Jamee Triana (:  1965) is a 58 y.o. female,Established patient, here for evaluation of the following chief complaint(s):  Cough (Green mucus/Negative COVID test/ runny nose/Sore throat/)    Wt Readings from Last 3 Encounters:   24 80.3 kg (177 lb)   24 80.6 kg (177 lb 9.6 oz)   24 81.6 kg (180 lb)      Assessment & Plan   ASSESSMENT/PLAN:  1. Other allergic rhinitis start azelastine 2 sprays twice a day with symptoms are controlled at present.  -     azelastine (ASTELIN) 0.1 % nasal spray; 1 spray by Nasal route 2 times daily Use in each nostril as directed, Disp-30 mL, R-5Normal  2. Hives controlled on fexofenadine and famotidine continue .  -     famotidine (PEPCID) 20 MG tablet; Take 1 tablet by mouth 2 times daily, Disp-60 tablet, R-5Normal  3. Chronic pruritus due to allergies controlled on fexofenadine and famotidine.  Continue.  -     fexofenadine (ALLEGRA) 180 MG tablet; Take 1 tablet by mouth daily Stop zyrtec, Disp-30 tablet, R-5Normal  4. Menopause hot flashes and symptoms controlled with Estratest, or should report reviewed and prescription refill.  Patient is up-to-date on mammogram.  -     estrogens, conjugated,-methylTESTOSTERone (EST ESTROGENS-METHYLTEST HS) 0.625-1.25 MG per tablet; Take 1 tablet by mouth daily., Disp-90 tablet, R-0Patient doing a slow cross over from 1.25-2.5 EE-MT to 0.625-1.25, by substituting a single low dose tablet starting 1 day a week, increasing by 1 day/week every 2 weeks, achieving conversion in 3 mosNormal  5. Acute bacterial bronchitis patient was started on doxycycline prior to appointment today and symptoms are improving.  6. Thrush patient is not a diabetic he gets thrush frequently and is starting to get thrush since she has been on the antibiotic we will give her the Mycelex troches.  -     clotrimazole (MYCELEX) 10 MG casimiro; Take 1 tablet by mouth 5 times daily for 10 days, Disp-50 tablet, R-0Normal  7. Chronic hoarseness, patient is

## 2024-08-02 ENCOUNTER — TELEPHONE (OUTPATIENT)
Dept: PRIMARY CARE CLINIC | Age: 59
End: 2024-08-02

## 2024-08-02 ASSESSMENT — ENCOUNTER SYMPTOMS
ALLERGIC/IMMUNOLOGIC NEGATIVE: 1
COUGH: 1
ABDOMINAL DISTENTION: 0
EYE REDNESS: 0
ANAL BLEEDING: 0
HEMOPTYSIS: 0
ABDOMINAL PAIN: 0
RECTAL PAIN: 0
NAUSEA: 0
EYE PAIN: 0
CHOKING: 0
SHORTNESS OF BREATH: 0
PHOTOPHOBIA: 0
CONSTIPATION: 0
WHEEZING: 0
RHINORRHEA: 0
TROUBLE SWALLOWING: 0
BLOOD IN STOOL: 0
HOARSE VOICE: 1
BACK PAIN: 1
VOMITING: 0
SINUS PRESSURE: 0
HEARTBURN: 0
SORE THROAT: 0

## 2024-08-02 NOTE — TELEPHONE ENCOUNTER
I called pt to see if she was able to send a new form. Pt states that she spoke to [employer] and was told we have a blank form on file.     Pt states that form should be faxed to [phone]. Atten to : Kelly Campbell.    Pt also stated that they informed her that there were several errors on the form and did not specify where the errors were on the form.     I lm on Kelly Campbell's vm requesting clarification. Provided our office call back number and fax.

## 2024-08-02 NOTE — TELEPHONE ENCOUNTER
Would like her FMLA filled out again for they are not taken the current one that is in the chart please fill out again

## 2024-08-05 ENCOUNTER — TELEPHONE (OUTPATIENT)
Dept: ADMINISTRATIVE | Age: 59
End: 2024-08-05

## 2024-08-05 NOTE — TELEPHONE ENCOUNTER
Submitted PA for Victoza 18MG/3ML pen-injectors   Via CMM Key: [id number] STATUS: PENDING.    Follow up done daily; if no decision with in three days we will refax.  If another three days goes by with no decision will call the insurance for status.

## 2024-08-06 NOTE — TELEPHONE ENCOUNTER
The medication was DENIED; DENIAL letter uploaded to [employer].    Use of this medication for any diagnosis other than type 2 diabetes mellitus is considered experimental.     If you want an APPEAL; please note in this encounter what new information you would like to APPEAL with to complete and route back to PA POOL.    If this requires a response please respond to the pool ( P [facility] MEDICATION PRE-AUTH).      Thank you please advise patient.

## 2024-08-15 DIAGNOSIS — M54.17 RADICULOPATHY, LUMBOSACRAL REGION: Primary | ICD-10-CM

## 2024-08-15 DIAGNOSIS — E78.2 MIXED HYPERLIPIDEMIA: ICD-10-CM

## 2024-08-15 DIAGNOSIS — R73.03 PREDIABETES: ICD-10-CM

## 2024-08-15 DIAGNOSIS — J45.40 MODERATE PERSISTENT ASTHMA WITHOUT COMPLICATION: ICD-10-CM

## 2024-08-15 NOTE — TELEPHONE ENCOUNTER
Medication:   Requested Prescriptions     Pending Prescriptions Disp Refills    liraglutide-weight management 18 MG/3ML SOPN 3 mL 0     Sig: Inject 0.6 mg into the skin daily Call for increased dose at refill        Last Filled:      Patient Phone Number: [phone] (home)     Last appt: 7/31/2024   Next appt: 9/11/2024    Last OARRS:        No data to display                Pharm states that: SMALLEST PACK SIZE IS 6 ML. CAN WE GET NEW RX.    PLEASE ADVISE.

## 2024-08-23 ENCOUNTER — TELEPHONE (OUTPATIENT)
Dept: PRIMARY CARE CLINIC | Age: 59
End: 2024-08-23

## 2024-08-28 NOTE — TELEPHONE ENCOUNTER
I spoke to pt. She states that nothing else is needed from Dr. Urbano. The company stated that they had everything that was needed.     
PT called in wanting to speak with Dr. Urbano or Candace.     Pt had a few issues that she wanted to discuss.     First, she says that her employer has denied her FMLA because there were some errors initially that needed corrected that wasn't done & she was told that it would be redone and sent back to her employer but it wasn't so they denied her claim.     Then, PT said that the prescription for Victoza required a prior authorization that was rejected and she hasn't heard anything since she sent a [employer] message that hasn't been addressed.     Please call PT back: [phone]  
Please advise.  
Alert and interactive, no focal deficits

## 2024-08-29 DIAGNOSIS — M48.062 SPINAL STENOSIS OF LUMBAR REGION WITH NEUROGENIC CLAUDICATION: Primary | ICD-10-CM

## 2024-08-29 RX ORDER — CELECOXIB 200 MG/1
200 CAPSULE ORAL DAILY PRN
Qty: 30 CAPSULE | Refills: 0 | Status: SHIPPED | OUTPATIENT
Start: 2024-08-29

## 2024-09-11 ENCOUNTER — OFFICE VISIT (OUTPATIENT)
Dept: PRIMARY CARE CLINIC | Age: 59
End: 2024-09-11

## 2024-09-11 VITALS
HEART RATE: 79 BPM | DIASTOLIC BLOOD PRESSURE: 75 MMHG | OXYGEN SATURATION: 100 % | BODY MASS INDEX: 29.82 KG/M2 | HEIGHT: 65 IN | WEIGHT: 179 LBS | TEMPERATURE: 97.8 F | SYSTOLIC BLOOD PRESSURE: 111 MMHG

## 2024-09-11 DIAGNOSIS — E55.9 VITAMIN D DEFICIENCY: ICD-10-CM

## 2024-09-11 DIAGNOSIS — K21.00 GASTROESOPHAGEAL REFLUX DISEASE WITH ESOPHAGITIS WITHOUT HEMORRHAGE: ICD-10-CM

## 2024-09-11 DIAGNOSIS — K59.04 CHRONIC IDIOPATHIC CONSTIPATION: ICD-10-CM

## 2024-09-11 DIAGNOSIS — Z86.010 HISTORY OF COLON POLYPS: Primary | ICD-10-CM

## 2024-09-11 DIAGNOSIS — R25.2 MUSCLE CRAMPS AT NIGHT: ICD-10-CM

## 2024-09-11 DIAGNOSIS — M54.16 LUMBAR RADICULOPATHY: ICD-10-CM

## 2024-09-11 RX ORDER — MULTIVIT-MIN/IRON/FOLIC ACID/K 18-600-40
1 CAPSULE ORAL DAILY
Qty: 90 CAPSULE | Refills: 1 | Status: SHIPPED | OUTPATIENT
Start: 2024-09-11

## 2024-09-11 RX ORDER — GINGER ROOT/GINGER ROOT EXT 262.5 MG
1 CAPSULE ORAL 2 TIMES DAILY
Qty: 180 TABLET | Refills: 3 | Status: SHIPPED | OUTPATIENT
Start: 2024-09-11

## 2024-09-11 RX ORDER — LANSOPRAZOLE 30 MG/1
CAPSULE, DELAYED RELEASE ORAL
Qty: 90 CAPSULE | Refills: 3 | Status: SHIPPED | OUTPATIENT
Start: 2024-09-11

## 2024-09-11 RX ORDER — POLYETHYLENE GLYCOL 3350 17 G/17G
34 POWDER, FOR SOLUTION ORAL DAILY
Qty: 510 G | Refills: 11 | Status: SHIPPED | OUTPATIENT
Start: 2024-09-11

## 2024-09-11 RX ORDER — PREGABALIN 50 MG/1
50 CAPSULE ORAL 3 TIMES DAILY
Qty: 90 CAPSULE | Refills: 2 | Status: SHIPPED | OUTPATIENT
Start: 2024-09-11 | End: 2024-12-10

## 2024-09-11 SDOH — ECONOMIC STABILITY: FOOD INSECURITY: WITHIN THE PAST 12 MONTHS, YOU WORRIED THAT YOUR FOOD WOULD RUN OUT BEFORE YOU GOT MONEY TO BUY MORE.: NEVER TRUE

## 2024-09-11 SDOH — ECONOMIC STABILITY: FOOD INSECURITY: WITHIN THE PAST 12 MONTHS, THE FOOD YOU BOUGHT JUST DIDN'T LAST AND YOU DIDN'T HAVE MONEY TO GET MORE.: NEVER TRUE

## 2024-09-11 SDOH — ECONOMIC STABILITY: INCOME INSECURITY: HOW HARD IS IT FOR YOU TO PAY FOR THE VERY BASICS LIKE FOOD, HOUSING, MEDICAL CARE, AND HEATING?: NOT HARD AT ALL

## 2024-09-11 ASSESSMENT — PATIENT HEALTH QUESTIONNAIRE - PHQ9
SUM OF ALL RESPONSES TO PHQ QUESTIONS 1-9: 0
1. LITTLE INTEREST OR PLEASURE IN DOING THINGS: NOT AT ALL
SUM OF ALL RESPONSES TO PHQ QUESTIONS 1-9: 0
2. FEELING DOWN, DEPRESSED OR HOPELESS: NOT AT ALL
SUM OF ALL RESPONSES TO PHQ9 QUESTIONS 1 & 2: 0

## 2024-09-11 ASSESSMENT — ENCOUNTER SYMPTOMS
BOWEL INCONTINENCE: 0
BACK PAIN: 1

## 2024-09-17 DIAGNOSIS — M54.16 LUMBAR RADICULOPATHY: Primary | ICD-10-CM

## 2024-09-17 RX ORDER — METHYLPREDNISOLONE 4 MG
TABLET, DOSE PACK ORAL
Qty: 1 KIT | Refills: 0 | Status: SHIPPED | OUTPATIENT
Start: 2024-09-17 | End: 2024-09-23

## 2024-10-11 ENCOUNTER — PATIENT MESSAGE (OUTPATIENT)
Dept: PRIMARY CARE CLINIC | Age: 59
End: 2024-10-11

## 2024-10-11 DIAGNOSIS — F51.01 PRIMARY INSOMNIA: ICD-10-CM

## 2024-10-11 DIAGNOSIS — Z78.0 MENOPAUSE: ICD-10-CM

## 2024-10-11 DIAGNOSIS — M54.16 LUMBAR RADICULOPATHY: ICD-10-CM

## 2024-10-11 DIAGNOSIS — K21.00 GASTROESOPHAGEAL REFLUX DISEASE WITH ESOPHAGITIS WITHOUT HEMORRHAGE: ICD-10-CM

## 2024-10-14 RX ORDER — ESTERIFIED ESTROGENS AND METHYLTESTOSTERONE .625; 1.25 MG/1; MG/1
1 TABLET ORAL DAILY
Qty: 90 TABLET | Refills: 0 | Status: SHIPPED | OUTPATIENT
Start: 2024-10-14

## 2024-10-14 NOTE — TELEPHONE ENCOUNTER
Please advise on weight loss meds.    Medication:   Requested Prescriptions     Pending Prescriptions Disp Refills    estrogens, conjugated,-methylTESTOSTERone (EST ESTROGENS-METHYLTEST HS) 0.625-1.25 MG per tablet 90 tablet 0     Sig: Take 1 tablet by mouth daily.        Last Filled:      Patient Phone Number: [phone] (home)     Last appt: 9/11/2024   Next appt: 10/11/2024    Last OARRS:        No data to display

## 2024-10-14 NOTE — TELEPHONE ENCOUNTER
Medication:   Requested Prescriptions     Pending Prescriptions Disp Refills    traZODone (DESYREL) 50 MG tablet 30 tablet 5     Sig: Take 1 tablet by mouth nightly as needed for Sleep    estrogens, conjugated,-methylTESTOSTERone (EST ESTROGENS-METHYLTEST HS) 0.625-1.25 MG per tablet 90 tablet 0     Sig: Take 1 tablet by mouth daily.    lansoprazole (PREVACID) 30 MG delayed release capsule 90 capsule 3     Sig: Take 1 capsule by mouth once daily    pregabalin (LYRICA) 50 MG capsule 90 capsule 2     Sig: Take 1 capsule by mouth 3 times daily for 90 days. Order clarification. Max Daily Amount: 150 mg        Last Filled:      Patient Phone Number: [phone] (home)     Last appt: 9/11/2024   Next appt: 10/21/2024    Last OARRS:        No data to display

## 2024-10-17 RX ORDER — TRAZODONE HYDROCHLORIDE 50 MG/1
50 TABLET, FILM COATED ORAL NIGHTLY PRN
Qty: 30 TABLET | Refills: 5 | Status: SHIPPED | OUTPATIENT
Start: 2024-10-17

## 2024-10-17 RX ORDER — ESTERIFIED ESTROGENS AND METHYLTESTOSTERONE .625; 1.25 MG/1; MG/1
1 TABLET ORAL DAILY
Qty: 90 TABLET | Refills: 0 | OUTPATIENT
Start: 2024-10-17

## 2024-10-17 RX ORDER — LANSOPRAZOLE 30 MG/1
CAPSULE, DELAYED RELEASE ORAL
Qty: 90 CAPSULE | Refills: 3 | Status: SHIPPED | OUTPATIENT
Start: 2024-10-17

## 2024-10-17 RX ORDER — PREGABALIN 50 MG/1
50 CAPSULE ORAL 3 TIMES DAILY
Qty: 90 CAPSULE | Refills: 2 | Status: SHIPPED | OUTPATIENT
Start: 2024-10-17 | End: 2025-01-15

## 2024-10-21 ENCOUNTER — OFFICE VISIT (OUTPATIENT)
Dept: PRIMARY CARE CLINIC | Age: 59
End: 2024-10-21
Payer: COMMERCIAL

## 2024-10-21 VITALS
HEIGHT: 65 IN | BODY MASS INDEX: 29.82 KG/M2 | DIASTOLIC BLOOD PRESSURE: 72 MMHG | SYSTOLIC BLOOD PRESSURE: 109 MMHG | OXYGEN SATURATION: 100 % | HEART RATE: 63 BPM | TEMPERATURE: 97.9 F | WEIGHT: 179 LBS

## 2024-10-21 DIAGNOSIS — M48.061 SPINAL STENOSIS OF LUMBAR REGION WITHOUT NEUROGENIC CLAUDICATION: ICD-10-CM

## 2024-10-21 DIAGNOSIS — L82.1 SEBORRHEIC KERATOSIS: Primary | ICD-10-CM

## 2024-10-21 DIAGNOSIS — M25.462 PAIN AND SWELLING OF LEFT KNEE: ICD-10-CM

## 2024-10-21 DIAGNOSIS — M25.562 PAIN AND SWELLING OF LEFT KNEE: ICD-10-CM

## 2024-10-21 DIAGNOSIS — M54.16 LUMBAR RADICULOPATHY: ICD-10-CM

## 2024-10-21 DIAGNOSIS — L80 VITILIGO: ICD-10-CM

## 2024-10-21 PROCEDURE — 99214 OFFICE O/P EST MOD 30 MIN: CPT | Performed by: INTERNAL MEDICINE

## 2024-10-21 SDOH — ECONOMIC STABILITY: INCOME INSECURITY: HOW HARD IS IT FOR YOU TO PAY FOR THE VERY BASICS LIKE FOOD, HOUSING, MEDICAL CARE, AND HEATING?: NOT HARD AT ALL

## 2024-10-21 SDOH — ECONOMIC STABILITY: FOOD INSECURITY: WITHIN THE PAST 12 MONTHS, THE FOOD YOU BOUGHT JUST DIDN'T LAST AND YOU DIDN'T HAVE MONEY TO GET MORE.: NEVER TRUE

## 2024-10-21 SDOH — ECONOMIC STABILITY: FOOD INSECURITY: WITHIN THE PAST 12 MONTHS, YOU WORRIED THAT YOUR FOOD WOULD RUN OUT BEFORE YOU GOT MONEY TO BUY MORE.: NEVER TRUE

## 2024-10-21 ASSESSMENT — PATIENT HEALTH QUESTIONNAIRE - PHQ9
SUM OF ALL RESPONSES TO PHQ QUESTIONS 1-9: 0
SUM OF ALL RESPONSES TO PHQ QUESTIONS 1-9: 0
SUM OF ALL RESPONSES TO PHQ9 QUESTIONS 1 & 2: 0
1. LITTLE INTEREST OR PLEASURE IN DOING THINGS: NOT AT ALL
SUM OF ALL RESPONSES TO PHQ QUESTIONS 1-9: 0
2. FEELING DOWN, DEPRESSED OR HOPELESS: NOT AT ALL
SUM OF ALL RESPONSES TO PHQ QUESTIONS 1-9: 0

## 2024-10-21 NOTE — PROGRESS NOTES
Jamee Triana (:  1965) is a 58 y.o. female,Established patient, here for evaluation of the following chief complaint(s):  Back Pain (Follow up- still painful)      Assessment & Plan   ASSESSMENT/PLAN:  1. Seborrheic keratosis type lesion noted below.  Since it is enlarging referred to surgeon for removal.      -     Michele Golden MD, General Surgery, [facility]  2. Vitiligo for the past 6 months hypopigmentation around the lips and on the neck concern for vitiligo will refer to dermatology.            -     Hitesh Martins MD, Dermatology, [facility]  3. BMI 29.0-29.9,adult Saxenda helpful in the past but could not tolerate that 2.4 mg dosage.  Patient took phentermine for 3 months April through  of this year that was helpful but could not continue beyond 3 months.  Patient was referred to Octavia Allred on my doctor for trial of Ozempic.  The pharmacy is in the  and will follow patient along with the online doctor and Ozempic will be more affordable around $100 a month, out-of-pocket.  Wt Readings from Last 3 Encounters:   10/23/24 80.3 kg (177 lb)   10/21/24 81.2 kg (179 lb)   24 81.2 kg (179 lb)      4. Pain and swelling of left knee without injury will rule out inflammatory arthritis and get an x-ray.  -     XR KNEE LEFT (3 VIEWS); Future  -     Sedimentation Rate; Future  -     CBC with Auto Differential; Future  -     Renal Function Panel; Future  -     NORMA Reflex to Antibody Cascade; Future  -     RHEUMATOID FACTOR; Future  5. Lumbar radiculopathy patient has had epidural surgeries without success.  She has not had nerve ablation.  She had a successful lumbar laminectomy in the past but now has problems at different levels.  And does not want any more surgery.  Patient has an appointment at  neurosurgery for further evaluation.  Will start physical therapy.  -     Non [facility] - External Referral To Physical Therapy  6. Spinal stenosis of lumbar region without

## 2024-10-21 NOTE — PATIENT INSTRUCTIONS
[facility] OR [employer] $ 99 per month. Do an on line application.

## 2024-10-23 ENCOUNTER — OFFICE VISIT (OUTPATIENT)
Dept: ENT CLINIC | Age: 59
End: 2024-10-23

## 2024-10-23 VITALS
HEIGHT: 65 IN | SYSTOLIC BLOOD PRESSURE: 109 MMHG | HEART RATE: 62 BPM | DIASTOLIC BLOOD PRESSURE: 74 MMHG | OXYGEN SATURATION: 98 % | BODY MASS INDEX: 29.49 KG/M2 | WEIGHT: 177 LBS | TEMPERATURE: 98.7 F

## 2024-10-23 DIAGNOSIS — R49.0 DYSPHONIA: Primary | ICD-10-CM

## 2024-10-23 DIAGNOSIS — K21.9 LARYNGOPHARYNGEAL REFLUX (LPR): ICD-10-CM

## 2024-10-23 DIAGNOSIS — M25.562 PAIN AND SWELLING OF LEFT KNEE: ICD-10-CM

## 2024-10-23 DIAGNOSIS — M25.462 PAIN AND SWELLING OF LEFT KNEE: ICD-10-CM

## 2024-10-23 LAB
ALBUMIN SERPL-MCNC: 4.2 G/DL (ref 3.4–5)
ANION GAP SERPL CALCULATED.3IONS-SCNC: 8 MMOL/L (ref 3–16)
BASOPHILS # BLD: 0 K/UL (ref 0–0.2)
BASOPHILS NFR BLD: 0.6 %
BUN SERPL-MCNC: 17 MG/DL (ref 7–20)
CALCIUM SERPL-MCNC: 9.5 MG/DL (ref 8.3–10.6)
CHLORIDE SERPL-SCNC: 105 MMOL/L (ref 99–110)
CO2 SERPL-SCNC: 27 MMOL/L (ref 21–32)
CREAT SERPL-MCNC: 0.7 MG/DL (ref 0.6–1.1)
DEPRECATED RDW RBC AUTO: 14.1 % (ref 12.4–15.4)
EOSINOPHIL # BLD: 0 K/UL (ref 0–0.6)
EOSINOPHIL NFR BLD: 1.3 %
ERYTHROCYTE [SEDIMENTATION RATE] IN BLOOD BY WESTERGREN METHOD: 3 MM/HR (ref 0–30)
GFR SERPLBLD CREATININE-BSD FMLA CKD-EPI: >90 ML/MIN/{1.73_M2}
GLUCOSE SERPL-MCNC: 87 MG/DL (ref 70–99)
HCT VFR BLD AUTO: 41.6 % (ref 36–48)
HGB BLD-MCNC: 13.6 G/DL (ref 12–16)
LYMPHOCYTES # BLD: 1.4 K/UL (ref 1–5.1)
LYMPHOCYTES NFR BLD: 39.3 %
MCH RBC QN AUTO: 30.5 PG (ref 26–34)
MCHC RBC AUTO-ENTMCNC: 32.7 G/DL (ref 31–36)
MCV RBC AUTO: 93.5 FL (ref 80–100)
MONOCYTES # BLD: 0.2 K/UL (ref 0–1.3)
MONOCYTES NFR BLD: 7.2 %
NEUTROPHILS # BLD: 1.8 K/UL (ref 1.7–7.7)
NEUTROPHILS NFR BLD: 51.6 %
PHOSPHATE SERPL-MCNC: 3.4 MG/DL (ref 2.5–4.9)
PLATELET # BLD AUTO: 280 K/UL (ref 135–450)
PMV BLD AUTO: 8.1 FL (ref 5–10.5)
POTASSIUM SERPL-SCNC: 4 MMOL/L (ref 3.5–5.1)
RBC # BLD AUTO: 4.45 M/UL (ref 4–5.2)
RHEUMATOID FACT SER IA-ACNC: <10 IU/ML
SODIUM SERPL-SCNC: 140 MMOL/L (ref 136–145)
WBC # BLD AUTO: 3.5 K/UL (ref 4–11)

## 2024-10-23 RX ORDER — PANTOPRAZOLE SODIUM 40 MG/1
40 TABLET, DELAYED RELEASE ORAL 2 TIMES DAILY
Qty: 180 TABLET | Refills: 0 | Status: SHIPPED | OUTPATIENT
Start: 2024-10-23 | End: 2025-01-21

## 2024-10-23 NOTE — PROGRESS NOTES
[facility]  DIVISION OF OTOLARYNGOLOGY- HEAD & NECK SURGERY  CONSULT      Jamee Triana (:  1965) is a 58 y.o. female, here for evaluation of the following chief complaint(s):  New Patient (Pt stated that her pcp is worried about her voice but other then that she does not know why she is here )      ASSESSMENT/PLAN:  1. Dysphonia  2. Laryngopharyngeal reflux (LPR)      This is a very pleasant 58 y.o. female here today for evaluation of the the above-noted complaints.      Previously evaluated for laryngopharyngeal reflux and dysphagia.  Symptoms had previously improved with acid reflux treatment.  The lansoprazole she is on now is not providing relief, so I will stop this and prescribe her pantoprazole 40 mg twice daily.  We discussed dietary and lifestyle modifications.    Dysphonia likely due to poor vocal hygiene, vocal cord pseudosulcus and voice overuse.  I recommended speech therapy.  The patient would like to think about this.    Medical Decision Making:  The following items were considered in medical decision making:  Independent review of images  Review / order clinical lab tests  Review / order radiology tests  Decision to obtain old records  Review and summation of old records as [facility] if applicable    SUBJECTIVE/OBJECTIVE:  HPI    Jamee Triana is here today for evaluation of dysphonia and feeling like something is getting caught in her throat.  I had previously seen her for dysphagia in the past.  Feels like her symptoms overall are getting worse.  She denies smoking or drinking.  No unintentional weight loss, neck masses, fevers or night sweats.          REVIEW OF SYSTEMS  The following systems were reviewed and revealed the following in addition to any already discussed in the HPI:    PHYSICAL EXAM    GENERAL: No acute distress, alert and oriented, moderate dysphonia  EYES: EOMI, Anti-icteric  HENT:   Head: Normocephalic and atraumatic.   Face:  Symmetric, facial nerve

## 2024-10-24 LAB — ANA SER QL IA: NEGATIVE

## 2024-10-25 NOTE — RESULT ENCOUNTER NOTE
Slightly low white blood cell count is stable and there is no anemia.  The rheumatoid arthritis lupus and inflammation test were negative.

## 2024-11-13 ENCOUNTER — OFFICE VISIT (OUTPATIENT)
Dept: PRIMARY CARE CLINIC | Age: 59
End: 2024-11-13

## 2024-11-13 VITALS
TEMPERATURE: 97.5 F | OXYGEN SATURATION: 98 % | DIASTOLIC BLOOD PRESSURE: 60 MMHG | HEART RATE: 73 BPM | HEIGHT: 66 IN | WEIGHT: 170.4 LBS | RESPIRATION RATE: 16 BRPM | SYSTOLIC BLOOD PRESSURE: 110 MMHG | BODY MASS INDEX: 27.38 KG/M2

## 2024-11-13 DIAGNOSIS — R22.2 MASS ON BACK: Primary | ICD-10-CM

## 2024-11-13 DIAGNOSIS — R22.2 MASS ON BACK: ICD-10-CM

## 2024-11-13 PROBLEM — D70.8 OTHER NEUTROPENIA (HCC): Status: ACTIVE | Noted: 2024-11-13

## 2024-11-13 LAB
ALBUMIN SERPL-MCNC: 4.7 G/DL (ref 3.4–5)
ALBUMIN/GLOB SERPL: 2.2 {RATIO} (ref 1.1–2.2)
ALP SERPL-CCNC: 43 U/L (ref 40–129)
ALT SERPL-CCNC: 28 U/L (ref 10–40)
ANION GAP SERPL CALCULATED.3IONS-SCNC: 12 MMOL/L (ref 3–16)
AST SERPL-CCNC: 27 U/L (ref 15–37)
BASOPHILS # BLD: 0 K/UL (ref 0–0.2)
BASOPHILS NFR BLD: 0.7 %
BILIRUB SERPL-MCNC: 1.6 MG/DL (ref 0–1)
BILIRUBIN, POC: NEGATIVE
BLOOD URINE, POC: NEGATIVE
BUN SERPL-MCNC: 8 MG/DL (ref 7–20)
CALCIUM SERPL-MCNC: 9 MG/DL (ref 8.3–10.6)
CHLORIDE SERPL-SCNC: 102 MMOL/L (ref 99–110)
CLARITY, POC: CLEAR
CO2 SERPL-SCNC: 25 MMOL/L (ref 21–32)
COLOR, POC: YELLOW
CREAT SERPL-MCNC: 0.6 MG/DL (ref 0.6–1.1)
DEPRECATED RDW RBC AUTO: 14 % (ref 12.4–15.4)
EOSINOPHIL # BLD: 0 K/UL (ref 0–0.6)
EOSINOPHIL NFR BLD: 1.2 %
GFR SERPLBLD CREATININE-BSD FMLA CKD-EPI: >90 ML/MIN/{1.73_M2}
GLUCOSE SERPL-MCNC: 68 MG/DL (ref 70–99)
GLUCOSE URINE, POC: NEGATIVE MG/DL
HCT VFR BLD AUTO: 42.7 % (ref 36–48)
HGB BLD-MCNC: 14.3 G/DL (ref 12–16)
KETONES, POC: 80 MG/DL
LEUKOCYTE EST, POC: NEGATIVE
LYMPHOCYTES # BLD: 1.4 K/UL (ref 1–5.1)
LYMPHOCYTES NFR BLD: 46.7 %
MCH RBC QN AUTO: 30.9 PG (ref 26–34)
MCHC RBC AUTO-ENTMCNC: 33.5 G/DL (ref 31–36)
MCV RBC AUTO: 92.2 FL (ref 80–100)
MONOCYTES # BLD: 0.2 K/UL (ref 0–1.3)
MONOCYTES NFR BLD: 7.3 %
NEUTROPHILS # BLD: 1.3 K/UL (ref 1.7–7.7)
NEUTROPHILS NFR BLD: 44.1 %
NITRITE, POC: NEGATIVE
PH, POC: 5.5
PLATELET # BLD AUTO: 213 K/UL (ref 135–450)
PMV BLD AUTO: 8.5 FL (ref 5–10.5)
POTASSIUM SERPL-SCNC: 3.7 MMOL/L (ref 3.5–5.1)
PROT SERPL-MCNC: 6.8 G/DL (ref 6.4–8.2)
PROTEIN, POC: NEGATIVE MG/DL
RBC # BLD AUTO: 4.63 M/UL (ref 4–5.2)
SODIUM SERPL-SCNC: 139 MMOL/L (ref 136–145)
SPECIFIC GRAVITY, POC: 1.01
UROBILINOGEN, POC: 0.2 MG/DL
WBC # BLD AUTO: 3 K/UL (ref 4–11)

## 2024-11-13 SDOH — ECONOMIC STABILITY: INCOME INSECURITY: HOW HARD IS IT FOR YOU TO PAY FOR THE VERY BASICS LIKE FOOD, HOUSING, MEDICAL CARE, AND HEATING?: NOT HARD AT ALL

## 2024-11-13 SDOH — ECONOMIC STABILITY: FOOD INSECURITY: WITHIN THE PAST 12 MONTHS, THE FOOD YOU BOUGHT JUST DIDN'T LAST AND YOU DIDN'T HAVE MONEY TO GET MORE.: NEVER TRUE

## 2024-11-13 SDOH — ECONOMIC STABILITY: FOOD INSECURITY: WITHIN THE PAST 12 MONTHS, YOU WORRIED THAT YOUR FOOD WOULD RUN OUT BEFORE YOU GOT MONEY TO BUY MORE.: NEVER TRUE

## 2024-11-13 ASSESSMENT — PATIENT HEALTH QUESTIONNAIRE - PHQ9
SUM OF ALL RESPONSES TO PHQ QUESTIONS 1-9: 0
SUM OF ALL RESPONSES TO PHQ9 QUESTIONS 1 & 2: 0
SUM OF ALL RESPONSES TO PHQ QUESTIONS 1-9: 0
2. FEELING DOWN, DEPRESSED OR HOPELESS: NOT AT ALL
SUM OF ALL RESPONSES TO PHQ QUESTIONS 1-9: 0
1. LITTLE INTEREST OR PLEASURE IN DOING THINGS: NOT AT ALL
SUM OF ALL RESPONSES TO PHQ QUESTIONS 1-9: 0

## 2024-11-13 NOTE — PROGRESS NOTES
No gallop.   Pulmonary:      Effort: Pulmonary effort is normal. No respiratory distress.      Breath sounds: No wheezing, rhonchi or rales.   Chest:      Chest wall: No tenderness.   Abdominal:      General: Bowel sounds are normal. There is no distension.      Palpations: Abdomen is soft. There is no mass.      Tenderness: There is no abdominal tenderness. There is no right CVA tenderness, left CVA tenderness, guarding or rebound.   Musculoskeletal:         General: No tenderness or deformity.      Cervical back: Normal range of motion and neck supple.      Right lower leg: No edema.      Left lower leg: No edema.      Comments: Unable to walk on heels and toes with bilateral leg weakness.  Limited flexion and extension of the lumbar spine.     Lymphadenopathy:      Cervical: No cervical adenopathy.   Skin:     General: Skin is warm and dry.      Coloration: Skin is not jaundiced or pale.      Findings: No bruising, erythema or rash.             Comments: Skin very dry and areas of redness back arms legs consistent with allergic reaction   Neurological:      Mental Status: She is alert and oriented to person, place, and time.      Cranial Nerves: No cranial nerve deficit.      Sensory: Sensory deficit present.      Motor: No weakness or abnormal muscle tone.      Gait: Gait normal.      Comments:              Psychiatric:         Mood and Affect: Mood normal.         Behavior: Behavior normal.         Thought Content: Thought content normal.         Judgment: Judgment normal.            An electronic signature was used to authenticate this note.    --LADONNA MILLER MD

## 2024-11-15 DIAGNOSIS — D70.9 NEUTROPENIA, UNSPECIFIED TYPE (HCC): Primary | ICD-10-CM

## 2024-11-25 PROBLEM — R22.2 MASS ON BACK: Status: ACTIVE | Noted: 2024-11-25

## 2024-11-25 ASSESSMENT — ENCOUNTER SYMPTOMS
PHOTOPHOBIA: 0
NAUSEA: 0
ALLERGIC/IMMUNOLOGIC NEGATIVE: 1
VOMITING: 0
ABDOMINAL DISTENTION: 0
CHOKING: 0
TROUBLE SWALLOWING: 0
WHEEZING: 0
EYE PAIN: 0
EYE REDNESS: 0
SHORTNESS OF BREATH: 0
CONSTIPATION: 0
SORE THROAT: 0
ANAL BLEEDING: 0
BLOOD IN STOOL: 0
COUGH: 0
RHINORRHEA: 0
RECTAL PAIN: 0
SINUS PRESSURE: 0

## 2024-11-26 ENCOUNTER — HOSPITAL ENCOUNTER (OUTPATIENT)
Dept: CT IMAGING | Age: 59
Discharge: HOME | End: 2024-11-26
Attending: INTERNAL MEDICINE
Payer: COMMERCIAL

## 2024-11-26 DIAGNOSIS — R22.2 MASS ON BACK: ICD-10-CM

## 2024-11-26 PROCEDURE — 74177 CT ABD & PELVIS W/CONTRAST: CPT

## 2024-11-26 PROCEDURE — 6360000004 HC RX CONTRAST MEDICATION: Performed by: INTERNAL MEDICINE

## 2024-11-26 RX ORDER — IOPAMIDOL 755 MG/ML
75 INJECTION, SOLUTION INTRAVASCULAR
Status: COMPLETED | OUTPATIENT
Start: 2024-11-26 | End: 2024-11-26

## 2024-11-26 RX ADMIN — IOPAMIDOL 75 ML: 755 INJECTION, SOLUTION INTRAVENOUS at 16:04

## 2024-11-29 DIAGNOSIS — R22.2 MASS ON BACK: Primary | ICD-10-CM

## 2024-11-29 NOTE — RESULT ENCOUNTER NOTE
The CT did not see the painful mass that we palpated.  It should be able to see a hematoma, area of bleeding, or tumor growth or even a deep lipomas.  It does not feel like a muscle spasm.  I am sending you to Dr. Saldivar  to further evaluate to see if we need a biopsy.  Call for an appointment.  Please call to schedule an appointment.     Referred By: Candace Urbano MD NPI: [id number]  Referral Reason: Specialty Services Required       Referred To: [facility]    [address] [place] [address]  [address] [place] [address]   Trae Saldivar [place] [address] Loc/POS:        Phone: [phone] Phone:    Fax: [phone] Fax:

## 2024-12-11 ENCOUNTER — TELEPHONE (OUTPATIENT)
Dept: SURGERY | Age: 59
End: 2024-12-11

## 2024-12-11 NOTE — TELEPHONE ENCOUNTER
MA called patient for a new patient appointment and there was no answer, MA left a message asking patient to please call us back as we have received a referral for her mass on her back.

## 2024-12-13 PROBLEM — K59.00 CONSTIPATION: Status: ACTIVE | Noted: 2017-07-10

## 2024-12-13 PROBLEM — H65.00 ACUTE SEROUS OTITIS MEDIA: Status: ACTIVE | Noted: 2020-08-09

## 2024-12-13 PROBLEM — M43.10 ACQUIRED SPONDYLOLISTHESIS: Status: ACTIVE | Noted: 2017-07-27

## 2024-12-13 PROBLEM — B37.0 CANDIDIASIS OF MOUTH: Status: ACTIVE | Noted: 2020-08-09

## 2024-12-13 PROBLEM — M48.061 FORAMINAL STENOSIS OF LUMBAR REGION: Status: ACTIVE | Noted: 2017-09-26

## 2024-12-13 PROBLEM — Z83.3 FAMILY HISTORY OF TYPE 2 DIABETES MELLITUS IN MOTHER: Status: ACTIVE | Noted: 2020-08-09

## 2024-12-13 PROBLEM — J01.00 ACUTE MAXILLARY SINUSITIS: Status: ACTIVE | Noted: 2019-12-22

## 2024-12-28 DIAGNOSIS — Z78.0 MENOPAUSE: ICD-10-CM

## 2024-12-30 RX ORDER — PANTOPRAZOLE SODIUM 40 MG/1
40 TABLET, DELAYED RELEASE ORAL 2 TIMES DAILY
Qty: 180 TABLET | Refills: 0 | Status: SHIPPED | OUTPATIENT
Start: 2024-12-30 | End: 2025-03-30

## 2024-12-30 NOTE — TELEPHONE ENCOUNTER
Medication:   Requested Prescriptions     Pending Prescriptions Disp Refills    estrogens, conjugated,-methylTESTOSTERone (EST ESTROGENS-METHYLTEST HS) 0.625-1.25 MG per tablet 90 tablet 0     Sig: Take 1 tablet by mouth daily.        Last Filled:      Patient Phone Number: [phone] (home)     Last appt: 11/13/2024   Next appt: 1/15/2025    Last OARRS:        No data to display

## 2024-12-30 NOTE — TELEPHONE ENCOUNTER
Refill Request:     Last Office Visit:  10/23/2024     Next Scheduled Visit : Visit date not found     Medication Requested:   Requested Prescriptions     Pending Prescriptions Disp Refills    pantoprazole (PROTONIX) 40 MG tablet 180 tablet 0     Sig: Take 1 tablet by mouth 2 times daily        Pharmacy:    [id number] - [place], [place] - [address] [facility] - P [phone] - F [phone]  [address] [facility] [address]  Phone: [phone] Fax: [phone]    [facility] - [place], [place] - [address] - P [phone] - F [phone]  [address]   [facility] [address]  Phone: [phone] Fax: [phone]    [facility] - [place], [place] - [address] [facility] - P [phone] - F [phone]  [address] [facility] [address]  Phone: [phone] Fax: [phone]    [id number] - [place], [place] - [address] [facility] [phone] - F [phone]  [address] [facility] [address]  Phone: [phone] Fax: [phone]    [facility] ([place]), [place] - [address] - P [phone] - F [phone]  [address] [facility] ([place]) [place] [address]  Phone: [phone] Fax: [phone]    [facility] [address]) - [place], [place] - [address] [facility] - P [phone] - F [phone]  [address] [place] [address]  [address] 4th [address]  [facility] [address]  Phone: [phone] Fax: [phone]

## 2024-12-31 RX ORDER — ESTERIFIED ESTROGENS AND METHYLTESTOSTERONE .625; 1.25 MG/1; MG/1
1 TABLET ORAL DAILY
Qty: 90 TABLET | Refills: 0 | Status: SHIPPED | OUTPATIENT
Start: 2024-12-31 | End: 2025-01-03 | Stop reason: SDUPTHER

## 2025-01-03 DIAGNOSIS — Z78.0 MENOPAUSE: ICD-10-CM

## 2025-01-03 NOTE — TELEPHONE ENCOUNTER
Pharm request clarification on Sig for Est Estrogens- Methyltest HS 0.65-1.25 mg oral tab. Please send a new Rx.      Sig says: Take 1 tab by mouth daily.    Notes state: Patient doing a slow cross over from 1.25-2.5 EE-MT To 0.625-1.25, By substituting a single low dose tablet starting 1 day a week, increasing by 1 day/ week, increasing by 1 day/week every 2 weeks,achieving conversion in 3 mos     Please advise.

## 2025-01-04 RX ORDER — ESTERIFIED ESTROGENS AND METHYLTESTOSTERONE .625; 1.25 MG/1; MG/1
1 TABLET ORAL DAILY
Qty: 90 TABLET | Refills: 0 | Status: SHIPPED | OUTPATIENT
Start: 2025-01-04

## 2025-01-15 ENCOUNTER — OFFICE VISIT (OUTPATIENT)
Dept: PRIMARY CARE CLINIC | Age: 60
End: 2025-01-15

## 2025-01-15 VITALS
WEIGHT: 172.6 LBS | HEIGHT: 65 IN | SYSTOLIC BLOOD PRESSURE: 103 MMHG | TEMPERATURE: 97.2 F | OXYGEN SATURATION: 100 % | HEART RATE: 66 BPM | BODY MASS INDEX: 28.76 KG/M2 | DIASTOLIC BLOOD PRESSURE: 66 MMHG

## 2025-01-15 DIAGNOSIS — N89.8 VAGINAL DISCHARGE: ICD-10-CM

## 2025-01-15 DIAGNOSIS — R20.0 BILATERAL LEG NUMBNESS: ICD-10-CM

## 2025-01-15 DIAGNOSIS — L29.9 CHRONIC PRURITUS: ICD-10-CM

## 2025-01-15 DIAGNOSIS — R20.0 BILATERAL HAND NUMBNESS: Primary | ICD-10-CM

## 2025-01-15 DIAGNOSIS — Z12.31 ENCOUNTER FOR SCREENING MAMMOGRAM FOR BREAST CANCER: ICD-10-CM

## 2025-01-15 RX ORDER — FAMOTIDINE 20 MG/1
20 TABLET, FILM COATED ORAL 2 TIMES DAILY
Qty: 180 TABLET | Refills: 1 | Status: SHIPPED | OUTPATIENT
Start: 2025-01-15

## 2025-01-15 SDOH — ECONOMIC STABILITY: FOOD INSECURITY: WITHIN THE PAST 12 MONTHS, THE FOOD YOU BOUGHT JUST DIDN'T LAST AND YOU DIDN'T HAVE MONEY TO GET MORE.: NEVER TRUE

## 2025-01-15 SDOH — ECONOMIC STABILITY: FOOD INSECURITY: WITHIN THE PAST 12 MONTHS, YOU WORRIED THAT YOUR FOOD WOULD RUN OUT BEFORE YOU GOT MONEY TO BUY MORE.: NEVER TRUE

## 2025-01-15 ASSESSMENT — PATIENT HEALTH QUESTIONNAIRE - PHQ9
SUM OF ALL RESPONSES TO PHQ QUESTIONS 1-9: 0
SUM OF ALL RESPONSES TO PHQ QUESTIONS 1-9: 0
SUM OF ALL RESPONSES TO PHQ9 QUESTIONS 1 & 2: 0
SUM OF ALL RESPONSES TO PHQ QUESTIONS 1-9: 0
SUM OF ALL RESPONSES TO PHQ QUESTIONS 1-9: 0
1. LITTLE INTEREST OR PLEASURE IN DOING THINGS: NOT AT ALL
2. FEELING DOWN, DEPRESSED OR HOPELESS: NOT AT ALL

## 2025-01-16 DIAGNOSIS — N89.8 VAGINAL DISCHARGE: ICD-10-CM

## 2025-01-17 DIAGNOSIS — R17 ELEVATED BILIRUBIN: ICD-10-CM

## 2025-01-17 DIAGNOSIS — R17 ELEVATED BILIRUBIN: Primary | ICD-10-CM

## 2025-01-17 LAB
ALBUMIN SERPL-MCNC: 4.5 G/DL (ref 3.4–5)
ALBUMIN/GLOB SERPL: 2.6 {RATIO} (ref 1.1–2.2)
ALP SERPL-CCNC: 50 U/L (ref 40–129)
ALT SERPL-CCNC: 29 U/L (ref 10–40)
ANION GAP SERPL CALCULATED.3IONS-SCNC: 8 MMOL/L (ref 3–16)
AST SERPL-CCNC: 22 U/L (ref 15–37)
BASOPHILS # BLD: 0 K/UL (ref 0–0.2)
BASOPHILS NFR BLD: 0.6 %
BILIRUB DIRECT SERPL-MCNC: 0.6 MG/DL (ref 0–0.3)
BILIRUB INDIRECT SERPL-MCNC: 1.1 MG/DL (ref 0–1)
BILIRUB SERPL-MCNC: 1.7 MG/DL (ref 0–1)
BILIRUB SERPL-MCNC: 1.7 MG/DL (ref 0–1)
BUN SERPL-MCNC: 14 MG/DL (ref 7–20)
C TRACH DNA UR QL NAA+PROBE: NEGATIVE
CALCIUM SERPL-MCNC: 9.1 MG/DL (ref 8.3–10.6)
CHLORIDE SERPL-SCNC: 107 MMOL/L (ref 99–110)
CO2 SERPL-SCNC: 27 MMOL/L (ref 21–32)
CREAT SERPL-MCNC: 0.6 MG/DL (ref 0.6–1.1)
DEPRECATED RDW RBC AUTO: 13.3 % (ref 12.4–15.4)
EOSINOPHIL # BLD: 0.1 K/UL (ref 0–0.6)
EOSINOPHIL NFR BLD: 1.7 %
GFR SERPLBLD CREATININE-BSD FMLA CKD-EPI: >90 ML/MIN/{1.73_M2}
GLUCOSE SERPL-MCNC: 87 MG/DL (ref 70–99)
HCT VFR BLD AUTO: 40.9 % (ref 36–48)
HGB BLD-MCNC: 13.9 G/DL (ref 12–16)
LIPASE SERPL-CCNC: 60 U/L (ref 13–60)
LYMPHOCYTES # BLD: 1.5 K/UL (ref 1–5.1)
LYMPHOCYTES NFR BLD: 43 %
MCH RBC QN AUTO: 31.4 PG (ref 26–34)
MCHC RBC AUTO-ENTMCNC: 33.9 G/DL (ref 31–36)
MCV RBC AUTO: 92.6 FL (ref 80–100)
MONOCYTES # BLD: 0.2 K/UL (ref 0–1.3)
MONOCYTES NFR BLD: 5.8 %
N GONORRHOEA DNA UR QL NAA+PROBE: NEGATIVE
NEUTROPHILS # BLD: 1.7 K/UL (ref 1.7–7.7)
NEUTROPHILS NFR BLD: 48.9 %
PLATELET # BLD AUTO: 274 K/UL (ref 135–450)
PMV BLD AUTO: 7.8 FL (ref 5–10.5)
POTASSIUM SERPL-SCNC: 4 MMOL/L (ref 3.5–5.1)
PROT SERPL-MCNC: 6.2 G/DL (ref 6.4–8.2)
RBC # BLD AUTO: 4.42 M/UL (ref 4–5.2)
SODIUM SERPL-SCNC: 142 MMOL/L (ref 136–145)
WBC # BLD AUTO: 3.4 K/UL (ref 4–11)

## 2025-01-17 NOTE — PROGRESS NOTES
[facility] Surgical Oncology & General Surgery  [address] [place] [address]  [address]  [place] [address]  Phone: [phone]  Fax: [phone]    Visit Date: 1/21/2025    HPI:   Jamee Triana is a 59 y.o. female referred by Dr. Urbano for evaluation and management of a mass located on her left lower back. States this has been present for several months. Hurts to palpate it.    Patient contact her PCP in early November 2024 with complaint's of a knot on left side of back area . Described it as a lump. Was not sure what it was but stated you can feel it. Pain was 8/20. Had to hold her breath to reduce pain     Patient had a CT ABD/Pelvis on 11/26/24 which was negative.        Past Medical History:   Diagnosis Date    Allergic rhinitis     Chronic back pain     l4-L5 lumbar spinal stenosis    Constipation     Diverticulosis     Hyperlipidemia     Intractable cluster headache syndrome     Irritable bowel syndrome with constipation 11/08/2023    Migraine without aura and without status migrainosus, not intractable 09/26/2022    Mild intermittent asthma without complication 08/09/2020     Past Surgical History:   Procedure Laterality Date    COLONOSCOPY N/A 9/13/2019    COLONOSCOPY biopsy sigmoid colon polyp performed by Jimmy Mendoza MD at [facility] ENDOSCOPY    HYSTERECTOMY (CERVIX STATUS UNKNOWN)  1999    endometriosis    HYSTERECTOMY, TOTAL ABDOMINAL (CERVIX REMOVED)      one ovary remains    LUMBAR FUSION Left 11/10/2017    LEFT L4-L5, L5-S1 TRANSFORAMINAL LUMBAR INTERBODY FUSION WITH with CT navigation    LUMBAR NERVE BLOCK N/A 9/18/2019    L3L4 INTERLAMINAR EPIDURAL STEROID INJECTION WITH FLUOROSCOPY performed by Yisel Norris MD at [facility] SIC    LUMBAR SPINE SURGERY Right 8/5/2019    RIGHT L3 AND L4 TRANSFORAMINAL EPIDURAL STEROID INJECTION WITH FLUOROSCOPY performed by Yisel Norris MD at [facility]    SALPINGO-OOPHORECTOMY      unilat - rt - WITH HYSTERECTOMY    SPINAL CORD STIMULATOR SURGERY N/A 2/24/2020    SPINAL CORD

## 2025-01-17 NOTE — RESULT ENCOUNTER NOTE
The GC chlamydia test was negative.  The lipase is normal but the upper limits of normal so I want to repeat it in a month to make sure it remains normal.  The bilirubin level is elevated so I am going to have the lab do a direct and indirect bilirubin to see if it needs further evaluation.  The other liver tests are normal.

## 2025-01-18 NOTE — RESULT ENCOUNTER NOTE
The direct bilirubin is elevated.  This means we need to further evaluate you to make sure there is no obstruction of the biliary tree.  I want to get an MRI of your bile ducts and refer you to the GI specialist for further evaluation.  Call [facility] scheduling at [phone] to arrange for the MRI of the bile ducts.  This is a make sure there is no partial obstruction in the bile ducts.  I have placed a referral for you to see Dr. Rowan, the GI specialist.  Call [phone] to arrange for an appointment.

## 2025-01-21 ENCOUNTER — OFFICE VISIT (OUTPATIENT)
Dept: SURGERY | Age: 60
End: 2025-01-21
Payer: COMMERCIAL

## 2025-01-21 VITALS
HEART RATE: 62 BPM | BODY MASS INDEX: 28.32 KG/M2 | DIASTOLIC BLOOD PRESSURE: 78 MMHG | SYSTOLIC BLOOD PRESSURE: 128 MMHG | TEMPERATURE: 98 F | HEIGHT: 65 IN | WEIGHT: 170 LBS

## 2025-01-21 DIAGNOSIS — R22.2 MASS ON BACK: Primary | ICD-10-CM

## 2025-01-21 PROCEDURE — 99204 OFFICE O/P NEW MOD 45 MIN: CPT | Performed by: SURGERY

## 2025-01-24 ENCOUNTER — HOSPITAL ENCOUNTER (OUTPATIENT)
Dept: MAMMOGRAPHY | Age: 60
Discharge: HOME | End: 2025-01-24
Payer: COMMERCIAL

## 2025-01-24 VITALS — BODY MASS INDEX: 27.16 KG/M2 | WEIGHT: 169 LBS | HEIGHT: 66 IN

## 2025-01-24 DIAGNOSIS — Z12.31 ENCOUNTER FOR SCREENING MAMMOGRAM FOR BREAST CANCER: ICD-10-CM

## 2025-01-24 PROCEDURE — 77063 BREAST TOMOSYNTHESIS BI: CPT

## 2025-01-27 DIAGNOSIS — R22.2 MASS ON BACK: Primary | ICD-10-CM

## 2025-02-26 ENCOUNTER — OFFICE VISIT (OUTPATIENT)
Dept: PRIMARY CARE CLINIC | Age: 60
End: 2025-02-26
Payer: COMMERCIAL

## 2025-02-26 VITALS
DIASTOLIC BLOOD PRESSURE: 56 MMHG | BODY MASS INDEX: 27.16 KG/M2 | HEART RATE: 83 BPM | HEIGHT: 66 IN | TEMPERATURE: 97.5 F | OXYGEN SATURATION: 98 % | SYSTOLIC BLOOD PRESSURE: 94 MMHG | WEIGHT: 169 LBS | RESPIRATION RATE: 16 BRPM

## 2025-02-26 DIAGNOSIS — R92.333 HETEROGENEOUSLY DENSE TISSUE OF BOTH BREASTS ON MAMMOGRAPHY: ICD-10-CM

## 2025-02-26 DIAGNOSIS — F41.8 TEST ANXIETY: Primary | ICD-10-CM

## 2025-02-26 DIAGNOSIS — R17 ELEVATED BILIRUBIN: ICD-10-CM

## 2025-02-26 DIAGNOSIS — Z78.0 MENOPAUSE: ICD-10-CM

## 2025-02-26 PROCEDURE — 99214 OFFICE O/P EST MOD 30 MIN: CPT | Performed by: INTERNAL MEDICINE

## 2025-02-26 RX ORDER — ESTERIFIED ESTROGENS AND METHYLTESTOSTERONE .625; 1.25 MG/1; MG/1
1 TABLET ORAL DAILY
Qty: 90 TABLET | Refills: 0 | Status: SHIPPED | OUTPATIENT
Start: 2025-02-26

## 2025-02-26 RX ORDER — NADOLOL 20 MG/1
20 TABLET ORAL DAILY PRN
Qty: 10 TABLET | Refills: 2 | Status: SHIPPED | OUTPATIENT
Start: 2025-02-26

## 2025-02-26 SDOH — ECONOMIC STABILITY: FOOD INSECURITY: WITHIN THE PAST 12 MONTHS, THE FOOD YOU BOUGHT JUST DIDN'T LAST AND YOU DIDN'T HAVE MONEY TO GET MORE.: NEVER TRUE

## 2025-02-26 SDOH — ECONOMIC STABILITY: FOOD INSECURITY: WITHIN THE PAST 12 MONTHS, YOU WORRIED THAT YOUR FOOD WOULD RUN OUT BEFORE YOU GOT MONEY TO BUY MORE.: NEVER TRUE

## 2025-02-26 ASSESSMENT — PATIENT HEALTH QUESTIONNAIRE - PHQ9
1. LITTLE INTEREST OR PLEASURE IN DOING THINGS: NOT AT ALL
SUM OF ALL RESPONSES TO PHQ QUESTIONS 1-9: 0
2. FEELING DOWN, DEPRESSED OR HOPELESS: NOT AT ALL
SUM OF ALL RESPONSES TO PHQ QUESTIONS 1-9: 0

## 2025-03-03 ASSESSMENT — ENCOUNTER SYMPTOMS
SINUS PRESSURE: 0
DIARRHEA: 0
ABDOMINAL DISTENTION: 0
EYE REDNESS: 0
RECTAL PAIN: 0
VOMITING: 0
SHORTNESS OF BREATH: 0
EYE PAIN: 0
ABDOMINAL PAIN: 0
WHEEZING: 0
ALLERGIC/IMMUNOLOGIC NEGATIVE: 1
SORE THROAT: 0
NAUSEA: 0
RHINORRHEA: 0
BLOOD IN STOOL: 0
CONSTIPATION: 0
ANAL BLEEDING: 0
TROUBLE SWALLOWING: 0
COUGH: 0
BACK PAIN: 0
PHOTOPHOBIA: 0
CHOKING: 0

## 2025-03-03 NOTE — PROGRESS NOTES
reports experiencing severe hormonal fluctuations, which have been exacerbated by the absence of her prescribed medication for the past 3 months. She describes symptoms of insomnia, irritability, hot flashes, and dry mouth. She also reports mood swings, including an incident where she became upset with her  without any apparent reason. She has undergone a hysterectomy.    She expresses concern about her dense breast tissue, as identified in her recent mammogram, particularly in light of her aunt's recent diagnosis and subsequent death from breast cancer. She has been performing monthly self-breast examinations and has not detected any abnormalities.    She requests a re-evaluation of her blood pressure, noting that it was previously low. She does not report any associated symptoms such as dizziness or lightheadedness but mentions persistent fatigue.    She is seeking a note to allow additional time for her upcoming board examinations due to test anxiety. She recalls a previous experience of severe anxiety during her NCLEX examination, which consisted of 275 questions. She reports symptoms of tachycardia and dyspnea during periods of anxiety. She has not previously taken any medication for her anxiety and is considering trying propranolol. She also reports excessive sweating, particularly in her arms,during periods of anxiety .    She has been on a medically supervised weight loss plan.  Weight loss medication regimen, which she believes has helped maintain her weight stability. However, she has discontinued her exercise routine due to back pain.  Back pain has been much improved along with her comprehensive intervention program with her back specialist where she has had BLADIMIR.  She is seeking an alternative weight loss medication that is covered by her insurance, as she has been paying out-of-pocket for her current medication. She is receiving compounded semiglutide.    She reports severe sinus issues,

## 2025-03-10 ENCOUNTER — TELEMEDICINE (OUTPATIENT)
Dept: PRIMARY CARE CLINIC | Age: 60
End: 2025-03-10
Payer: COMMERCIAL

## 2025-03-10 DIAGNOSIS — J01.00 ACUTE MAXILLARY SINUSITIS, RECURRENCE NOT SPECIFIED: Primary | ICD-10-CM

## 2025-03-10 DIAGNOSIS — Z76.89 ENCOUNTER FOR WEIGHT MANAGEMENT: ICD-10-CM

## 2025-03-10 DIAGNOSIS — R17 ELEVATED BILIRUBIN: ICD-10-CM

## 2025-03-10 LAB
ALBUMIN SERPL-MCNC: 4.4 G/DL (ref 3.4–5)
ALBUMIN/GLOB SERPL: 2.4 {RATIO} (ref 1.1–2.2)
ALP SERPL-CCNC: 65 U/L (ref 40–129)
ALT SERPL-CCNC: 35 U/L (ref 10–40)
ANION GAP SERPL CALCULATED.3IONS-SCNC: 9 MMOL/L (ref 3–16)
AST SERPL-CCNC: 22 U/L (ref 15–37)
BILIRUB DIRECT SERPL-MCNC: 0.2 MG/DL (ref 0–0.3)
BILIRUB INDIRECT SERPL-MCNC: 0.3 MG/DL (ref 0–1)
BILIRUB SERPL-MCNC: 0.5 MG/DL (ref 0–1)
BUN SERPL-MCNC: 13 MG/DL (ref 7–20)
CALCIUM SERPL-MCNC: 9.2 MG/DL (ref 8.3–10.6)
CHLORIDE SERPL-SCNC: 106 MMOL/L (ref 99–110)
CO2 SERPL-SCNC: 27 MMOL/L (ref 21–32)
CREAT SERPL-MCNC: 0.6 MG/DL (ref 0.6–1.1)
GFR SERPLBLD CREATININE-BSD FMLA CKD-EPI: >90 ML/MIN/{1.73_M2}
GLUCOSE SERPL-MCNC: 89 MG/DL (ref 70–99)
POTASSIUM SERPL-SCNC: 4.1 MMOL/L (ref 3.5–5.1)
PROT SERPL-MCNC: 6.2 G/DL (ref 6.4–8.2)
SODIUM SERPL-SCNC: 142 MMOL/L (ref 136–145)

## 2025-03-10 PROCEDURE — 99214 OFFICE O/P EST MOD 30 MIN: CPT | Performed by: INTERNAL MEDICINE

## 2025-03-10 RX ORDER — AZELASTINE 1 MG/ML
1 SPRAY, METERED NASAL 2 TIMES DAILY
Qty: 30 ML | Refills: 5 | Status: SHIPPED | OUTPATIENT
Start: 2025-03-10

## 2025-03-10 RX ORDER — DOXYCYCLINE HYCLATE 100 MG
100 TABLET ORAL 2 TIMES DAILY
Qty: 20 TABLET | Refills: 0 | Status: SHIPPED | OUTPATIENT
Start: 2025-03-10 | End: 2025-03-20

## 2025-03-10 ASSESSMENT — ENCOUNTER SYMPTOMS
HOARSE VOICE: 1
SORE THROAT: 1

## 2025-03-10 NOTE — PROGRESS NOTES
Jamee FALK Triana, was evaluated through a synchronous (real-time) audio-video encounter. The patient (or guardian if applicable) is aware that this is a billable service, which includes applicable co-pays. This Virtual Visit was conducted with patient's (and/or legal guardian's) consent. Patient identification was verified, and a caregiver was present when appropriate.   The patient was located at Home: [address] [place], [place] [id number]  Provider was located at Facility (Appt Dept): [address] [place], [place] [id number]  Confirm you are appropriately licensed, registered, or certified to deliver care in the state where the patient is located as indicated above. If you are not or unsure, please re-schedule the visit: Yes, I confirm.     Jamee Triana (:  1965) is a Established patient, presenting virtually for evaluation of the following:      Below is the assessment and plan developed based on review of pertinent history, physical exam, labs, studies, and medications.     Assessment & Plan  Acute maxillary sinusitis, recurrence not specified    The purulent drainage.  Patient did a negative home COVID test and also instructed to buy a combo COVID flu test from [employer] again today to make sure no influenza.  Will treat the sinus infection with doxycycline 100 mg twice a day for 10 days and start azelastine nasal spray 2 sprays twice a day.    Orders:    doxycycline hyclate (VIBRA-TABS) 100 MG tablet; Take 1 tablet by mouth 2 times daily for 10 days    azelastine (ASTELIN) 0.1 % nasal spray; 1 spray by Nasal route 2 times daily Use in each nostril as directed  look for vital  Encounter for weight management  Patient was taking 2 mg of compounded semaglutide.  Denied nausea or vomiting or abdominal pain.  Patient discontinued with the [employer] compounding company due to some disputed charges.  She was reimbursed.  Willorder compounded semaglutide from [facility] pharmacy and monitor at 1 mg weekly and

## 2025-03-10 NOTE — ASSESSMENT & PLAN NOTE
The purulent drainage.  Patient did a negative home COVID test and also instructed to buy a combo COVID flu test from [employer] again today to make sure no influenza.  Will treat the sinus infection with doxycycline 100 mg twice a day for 10 days and start azelastine nasal spray 2 sprays twice a day.    Orders:    doxycycline hyclate (VIBRA-TABS) 100 MG tablet; Take 1 tablet by mouth 2 times daily for 10 days    azelastine (ASTELIN) 0.1 % nasal spray; 1 spray by Nasal route 2 times daily Use in each nostril as directed  look for vital

## 2025-03-11 ENCOUNTER — RESULTS FOLLOW-UP (OUTPATIENT)
Dept: PRIMARY CARE CLINIC | Age: 60
End: 2025-03-11

## 2025-03-13 ASSESSMENT — ENCOUNTER SYMPTOMS
SINUS PRESSURE: 1
VOMITING: 0
COUGH: 1
SHORTNESS OF BREATH: 0
CHANGE IN BOWEL HABIT: 0
SWOLLEN GLANDS: 0
ABDOMINAL PAIN: 0
NAUSEA: 0
VISUAL CHANGE: 0

## 2025-03-20 DIAGNOSIS — M48.061 FORAMINAL STENOSIS OF LUMBAR REGION: Primary | ICD-10-CM

## 2025-03-20 RX ORDER — METHYLPREDNISOLONE 4 MG/1
4 TABLET ORAL SEE ADMIN INSTRUCTIONS
Qty: 1 KIT | Refills: 0 | Status: SHIPPED | OUTPATIENT
Start: 2025-03-20

## 2025-04-24 ENCOUNTER — TELEPHONE (OUTPATIENT)
Dept: PRIMARY CARE CLINIC | Age: 60
End: 2025-04-24

## 2025-04-24 DIAGNOSIS — L29.9 CHRONIC PRURITUS: ICD-10-CM

## 2025-04-24 NOTE — TELEPHONE ENCOUNTER
Pt called she is wanting to talk to Dr. Urbano about   Last visit 3/10/25  Next visit 4/30/25  famotidine (PEPCID) 20 MG tablet

## 2025-04-25 DIAGNOSIS — L29.9 CHRONIC PRURITUS: ICD-10-CM

## 2025-04-25 RX ORDER — FAMOTIDINE 20 MG/1
20 TABLET, FILM COATED ORAL 2 TIMES DAILY
Qty: 180 TABLET | Refills: 1 | Status: SHIPPED | OUTPATIENT
Start: 2025-04-25

## 2025-04-25 RX ORDER — FAMOTIDINE 20 MG/1
20 TABLET, FILM COATED ORAL 2 TIMES DAILY
Qty: 180 TABLET | Refills: 1 | Status: CANCELLED | OUTPATIENT
Start: 2025-04-25

## 2025-04-25 NOTE — TELEPHONE ENCOUNTER
Medication:   Requested Prescriptions     Pending Prescriptions Disp Refills    famotidine (PEPCID) 20 MG tablet 180 tablet 1     Sig: Take 1 tablet by mouth 2 times daily        Last Filled:      Patient Phone Number: [phone] (home)     Last appt: 3/10/2025   Next appt: 4/30/2025    Last OARRS:        No data to display

## 2025-04-26 ENCOUNTER — APPOINTMENT (OUTPATIENT)
Dept: CT IMAGING | Age: 60
End: 2025-04-26
Payer: OTHER MISCELLANEOUS

## 2025-04-26 ENCOUNTER — HOSPITAL ENCOUNTER (EMERGENCY)
Age: 60
Discharge: HOME | End: 2025-04-26
Payer: OTHER MISCELLANEOUS

## 2025-04-26 VITALS
HEIGHT: 66 IN | HEART RATE: 70 BPM | RESPIRATION RATE: 15 BRPM | TEMPERATURE: 98.4 F | SYSTOLIC BLOOD PRESSURE: 125 MMHG | BODY MASS INDEX: 29.09 KG/M2 | DIASTOLIC BLOOD PRESSURE: 89 MMHG | WEIGHT: 181 LBS | OXYGEN SATURATION: 95 %

## 2025-04-26 DIAGNOSIS — M54.42 ACUTE BILATERAL LOW BACK PAIN WITH LEFT-SIDED SCIATICA: ICD-10-CM
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
## 2025-04-26 PROCEDURE — 6370000000 HC RX 637 (ALT 250 FOR IP): Performed by: PHYSICIAN ASSISTANT

## 2025-04-26 PROCEDURE — 72131 CT LUMBAR SPINE W/O DYE: CPT

## 2025-04-26 PROCEDURE — 99284 EMERGENCY DEPT VISIT MOD MDM: CPT

## 2025-04-26 RX ORDER — TRAMADOL HYDROCHLORIDE 50 MG/1
50 TABLET ORAL EVERY 6 HOURS PRN
Qty: 10 TABLET | Refills: 0 | Status: SHIPPED | OUTPATIENT
Start: 2025-04-26 | End: 2025-04-29

## 2025-04-26 RX ORDER — LIDOCAINE 50 MG/G
1 PATCH TOPICAL DAILY
Qty: 20 PATCH | Refills: 0 | Status: SHIPPED | OUTPATIENT
Start: 2025-04-26 | End: 2025-05-16

## 2025-04-26 RX ORDER — OXYCODONE AND ACETAMINOPHEN 5; 325 MG/1; MG/1
1 TABLET ORAL ONCE
Refills: 0 | Status: COMPLETED | OUTPATIENT
Start: 2025-04-26 | End: 2025-04-26

## 2025-04-26 RX ORDER — METHOCARBAMOL 750 MG/1
750 TABLET, FILM COATED ORAL 2 TIMES DAILY
Qty: 20 TABLET | Refills: 0 | Status: SHIPPED | OUTPATIENT
Start: 2025-04-26

## 2025-04-26 RX ADMIN — OXYCODONE AND ACETAMINOPHEN 1 TABLET: 5; 325 TABLET ORAL at 12:33

## 2025-04-26 ASSESSMENT — PAIN SCALES - GENERAL
PAINLEVEL_OUTOF10: 8

## 2025-04-26 ASSESSMENT — ENCOUNTER SYMPTOMS
ABDOMINAL PAIN: 0
BACK PAIN: 1
SHORTNESS OF BREATH: 0
NAUSEA: 0
VOMITING: 0
EYE PAIN: 0
COUGH: 0
SORE THROAT: 0

## 2025-04-26 ASSESSMENT — PAIN - FUNCTIONAL ASSESSMENT: PAIN_FUNCTIONAL_ASSESSMENT: 0-10

## 2025-04-26 ASSESSMENT — PAIN DESCRIPTION - PAIN TYPE: TYPE: ACUTE PAIN

## 2025-04-26 ASSESSMENT — PAIN DESCRIPTION - FREQUENCY: FREQUENCY: CONTINUOUS

## 2025-04-26 ASSESSMENT — LIFESTYLE VARIABLES
HOW OFTEN DO YOU HAVE A DRINK CONTAINING ALCOHOL: NEVER
HOW MANY STANDARD DRINKS CONTAINING ALCOHOL DO YOU HAVE ON A TYPICAL DAY: PATIENT DOES NOT DRINK

## 2025-04-26 ASSESSMENT — PAIN DESCRIPTION - LOCATION: LOCATION: BACK;LEG

## 2025-04-26 ASSESSMENT — PAIN DESCRIPTION - ORIENTATION: ORIENTATION: RIGHT;LEFT

## 2025-04-26 NOTE — DISCHARGE INSTRUCTIONS
Follow-up orthopedics if worsens or no improvement  Take prescribed medication as prescribed only  Take Tylenol for pain only use the tramadol for breakthrough pain  Return emergency room as needed  Lidocaine patches only to be worn for 12 hours 12 hours on and 12 hours off before replacing with another patch as needed
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
## 2025-04-28 ENCOUNTER — TELEPHONE (OUTPATIENT)
Dept: ORTHOPEDIC SURGERY | Age: 60
End: 2025-04-28

## 2025-04-28 NOTE — TELEPHONE ENCOUNTER
----- Message from Dr. Dax Almaraz MD sent at 4/27/2025  3:03 PM EDT -----  ER referral    Can see Kalyn  ----- Message -----  From: Discharge Provider, Automatic  Sent: 4/27/2025   3:56 AM EDT  To: Dax Almaraz MD

## 2025-04-30 ENCOUNTER — OFFICE VISIT (OUTPATIENT)
Dept: PRIMARY CARE CLINIC | Age: 60
End: 2025-04-30
Payer: COMMERCIAL

## 2025-04-30 VITALS
HEART RATE: 80 BPM | DIASTOLIC BLOOD PRESSURE: 58 MMHG | OXYGEN SATURATION: 99 % | WEIGHT: 172.8 LBS | SYSTOLIC BLOOD PRESSURE: 92 MMHG | BODY MASS INDEX: 27.77 KG/M2 | HEIGHT: 66 IN | TEMPERATURE: 97.3 F | RESPIRATION RATE: 16 BRPM

## 2025-04-30 DIAGNOSIS — R53.83 OTHER FATIGUE: ICD-10-CM

## 2025-04-30 DIAGNOSIS — M54.32 SCIATICA, LEFT SIDE: Primary | ICD-10-CM
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
## 2025-04-30 PROCEDURE — 99214 OFFICE O/P EST MOD 30 MIN: CPT | Performed by: INTERNAL MEDICINE

## 2025-04-30 RX ORDER — MUPIROCIN 20 MG/G
OINTMENT TOPICAL
Qty: 15 G | Refills: 0 | Status: SHIPPED | OUTPATIENT
Start: 2025-04-30 | End: 2025-05-07

## 2025-04-30 SDOH — ECONOMIC STABILITY: FOOD INSECURITY: WITHIN THE PAST 12 MONTHS, YOU WORRIED THAT YOUR FOOD WOULD RUN OUT BEFORE YOU GOT MONEY TO BUY MORE.: NEVER TRUE

## 2025-04-30 SDOH — ECONOMIC STABILITY: FOOD INSECURITY: WITHIN THE PAST 12 MONTHS, THE FOOD YOU BOUGHT JUST DIDN'T LAST AND YOU DIDN'T HAVE MONEY TO GET MORE.: NEVER TRUE

## 2025-04-30 ASSESSMENT — PATIENT HEALTH QUESTIONNAIRE - PHQ9
SUM OF ALL RESPONSES TO PHQ QUESTIONS 1-9: 0
2. FEELING DOWN, DEPRESSED OR HOPELESS: NOT AT ALL
SUM OF ALL RESPONSES TO PHQ QUESTIONS 1-9: 0
1. LITTLE INTEREST OR PLEASURE IN DOING THINGS: NOT AT ALL
SUM OF ALL RESPONSES TO PHQ QUESTIONS 1-9: 0
SUM OF ALL RESPONSES TO PHQ QUESTIONS 1-9: 0

## 2025-05-02 ENCOUNTER — TELEPHONE (OUTPATIENT)
Dept: ADMINISTRATIVE | Age: 60
End: 2025-05-02

## 2025-05-02 NOTE — TELEPHONE ENCOUNTER
Submitted PA for Zepbound 2.5MG/0.5ML pen-injectors  [facility] [id number] STATUS: NOT SENT    OV note is pending. Will need to submit with PA request.     Follow up done daily; if no decision with in three days we will refax.  If another three days goes by with no decision will call the insurance for status.

## 2025-05-05 ASSESSMENT — ENCOUNTER SYMPTOMS
ABDOMINAL PAIN: 0
DIARRHEA: 0
ALLERGIC/IMMUNOLOGIC NEGATIVE: 1
RHINORRHEA: 0
SHORTNESS OF BREATH: 0
ABDOMINAL DISTENTION: 0
SORE THROAT: 0
CONSTIPATION: 0
VOMITING: 0
BACK PAIN: 1
ANAL BLEEDING: 0
WHEEZING: 0
TROUBLE SWALLOWING: 0
COUGH: 0
RECTAL PAIN: 0
NAUSEA: 0
EYE REDNESS: 0
CHOKING: 0
PHOTOPHOBIA: 0
EYE PAIN: 0
BLOOD IN STOOL: 0
SINUS PRESSURE: 0
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
## 2025-05-06 NOTE — TELEPHONE ENCOUNTER
Office note completed.    Submitted PA for Zepbound 2.5MG/0.5ML pen-injectors  Via CMM [id number] STATUS:     Zepbound 2.5MG/0.5ML SC SOAJ has been approved. If you are changing the member's therapy the previously approved therapy will be canceled and replaced. The authorization is valid from 04/06/2025 through 11/02/2025. A letter of explanation will also be mailed to the patient.     If this requires a response please respond to the pool ( P MHCX PSC MEDICATION PRE-AUTH).      Thank you please advise patient.

## 2025-05-21 ENCOUNTER — TELEPHONE (OUTPATIENT)
Dept: ADMINISTRATIVE | Age: 60
End: 2025-05-21

## 2025-05-21 NOTE — TELEPHONE ENCOUNTER
Submitted PA for Pantoprazole Sodium 40MG dr tablets   Via CMM Key: [id number]  STATUS: approved 4/21/25-5/21/26    Follow up done daily; if no decision with in three days we will refax.  If another three days goes by with no decision will call the insurance for status.

## 2025-05-26 DIAGNOSIS — M54.16 LUMBAR RADICULOPATHY: ICD-10-CM

## 2025-05-28 RX ORDER — PREGABALIN 50 MG/1
50 CAPSULE ORAL 3 TIMES DAILY
Qty: 90 CAPSULE | Refills: 2 | Status: SHIPPED | OUTPATIENT
Start: 2025-05-28 | End: 2025-08-26

## 2025-06-11 ENCOUNTER — OFFICE VISIT (OUTPATIENT)
Dept: PRIMARY CARE CLINIC | Age: 60
End: 2025-06-11

## 2025-06-11 VITALS
OXYGEN SATURATION: 99 % | DIASTOLIC BLOOD PRESSURE: 70 MMHG | WEIGHT: 174.8 LBS | SYSTOLIC BLOOD PRESSURE: 101 MMHG | RESPIRATION RATE: 18 BRPM | HEART RATE: 79 BPM | BODY MASS INDEX: 28.65 KG/M2

## 2025-06-11 DIAGNOSIS — M79.671 RIGHT FOOT PAIN: Primary | ICD-10-CM

## 2025-06-11 DIAGNOSIS — R06.83 SNORING: ICD-10-CM

## 2025-06-11 DIAGNOSIS — M25.562 ACUTE PAIN OF LEFT KNEE: ICD-10-CM

## 2025-06-11 RX ORDER — CELECOXIB 200 MG/1
200 CAPSULE ORAL DAILY
Qty: 30 CAPSULE | Refills: 3 | Status: SHIPPED | OUTPATIENT
Start: 2025-06-11

## 2025-06-11 RX ORDER — FEXOFENADINE HCL 180 MG/1
180 TABLET ORAL DAILY
Qty: 90 TABLET | Refills: 1 | Status: SHIPPED | OUTPATIENT
Start: 2025-06-11

## 2025-06-11 NOTE — PATIENT INSTRUCTIONS
?? [facility] Equity Article Start ??  What to Do If You Are at Risk or Have Peripheral Artery Disease  Peripheral artery disease (PAD) is a health problem that happens when the tubes that carry blood to your legs and feet get smaller. This happens because fatty stuff builds up inside the tubes. When the tubes get smaller, it's harder for blood to flow. This can cause pain when you walk (claudication, pronounced claw-dih-JAY-shun). If PAD isn't treated, it can get worse and make it really hard for blood to reach your arms and legs. In really bad cases, parts of your body might not get enough blood, and doctors might have to cut off a part of your leg or foot.  Why are some groups more affected by PAD?  Unfortunately, PAD affects some groups of people more than others. Black, , and Indigenous adults are more likely to develop PAD and may have worse health results. Women are also more likely to develop PAD than men.  or  adults with PAD experience worse outcomes compared to other groups. This difference is likely due to a combination of factors:  Health Conditions: Diabetes, high blood pressure, high cholesterol, and smoking (commercial tobacco) are big risk factors for PAD. These health problems happen more often in some groups of people, which raises their risk of getting PAD.  Lifestyle Factors: An unhealthy diet, lack of physical activity, and stress can also contribute to PAD.  A healthy diet includes lots of fruits, vegetables, and whole grains. It's low in saturated and trans fats, sugar, and salt. For many communities, living a healthy lifestyle isn’t always easy. Some people don’t have easy access to things like affordable fresh fruits and vegetables and green places to exercise. This can make it harder for some people to lower their risk for PAD.  Access to Care: Limited access to healthcare can make it harder for people with PAD to get better. This can be due to factors like

## 2025-06-19 ENCOUNTER — OFFICE VISIT (OUTPATIENT)
Dept: SLEEP MEDICINE | Age: 60
End: 2025-06-19
Payer: COMMERCIAL

## 2025-06-19 VITALS
HEIGHT: 66 IN | RESPIRATION RATE: 18 BRPM | DIASTOLIC BLOOD PRESSURE: 68 MMHG | WEIGHT: 171.8 LBS | SYSTOLIC BLOOD PRESSURE: 108 MMHG | BODY MASS INDEX: 27.61 KG/M2 | TEMPERATURE: 97.1 F | OXYGEN SATURATION: 99 % | HEART RATE: 75 BPM

## 2025-06-19 DIAGNOSIS — R53.83 FATIGUE, UNSPECIFIED TYPE: ICD-10-CM

## 2025-06-19 DIAGNOSIS — G47.19 EXCESSIVE DAYTIME SLEEPINESS: ICD-10-CM

## 2025-06-19 DIAGNOSIS — M79.671 RIGHT FOOT PAIN: ICD-10-CM

## 2025-06-19 DIAGNOSIS — M25.562 ACUTE PAIN OF LEFT KNEE: ICD-10-CM

## 2025-06-19 DIAGNOSIS — G47.9 RESTLESS SLEEPER: ICD-10-CM

## 2025-06-19 DIAGNOSIS — F51.04 INSOMNIA, PSYCHOPHYSIOLOGICAL: Primary | ICD-10-CM

## 2025-06-19 PROCEDURE — 99204 OFFICE O/P NEW MOD 45 MIN: CPT | Performed by: PSYCHIATRY & NEUROLOGY

## 2025-06-19 RX ORDER — DOXEPIN HYDROCHLORIDE 10 MG/1
10 CAPSULE ORAL NIGHTLY
Qty: 30 CAPSULE | Refills: 3 | Status: SHIPPED | OUTPATIENT
Start: 2025-06-19

## 2025-06-19 ASSESSMENT — SLEEP AND FATIGUE QUESTIONNAIRES
HOW LIKELY ARE YOU TO NOD OFF OR FALL ASLEEP WHILE SITTING AND READING: HIGH CHANCE OF DOZING
HOW LIKELY ARE YOU TO NOD OFF OR FALL ASLEEP WHILE WATCHING TV: HIGH CHANCE OF DOZING
HOW LIKELY ARE YOU TO NOD OFF OR FALL ASLEEP IN A CAR, WHILE STOPPED FOR A FEW MINUTES IN TRAFFIC: WOULD NEVER DOZE
HOW LIKELY ARE YOU TO NOD OFF OR FALL ASLEEP WHILE SITTING AND TALKING TO SOMEONE: WOULD NEVER DOZE
HOW LIKELY ARE YOU TO NOD OFF OR FALL ASLEEP WHILE SITTING INACTIVE IN A PUBLIC PLACE: WOULD NEVER DOZE
HOW LIKELY ARE YOU TO NOD OFF OR FALL ASLEEP WHEN YOU ARE A PASSENGER IN A CAR FOR AN HOUR WITHOUT A BREAK: HIGH CHANCE OF DOZING
HOW LIKELY ARE YOU TO NOD OFF OR FALL ASLEEP WHILE SITTING QUIETLY AFTER LUNCH WITHOUT ALCOHOL: HIGH CHANCE OF DOZING
HOW LIKELY ARE YOU TO NOD OFF OR FALL ASLEEP WHILE LYING DOWN TO REST IN THE AFTERNOON WHEN CIRCUMSTANCES PERMIT: HIGH CHANCE OF DOZING
ESS TOTAL SCORE: 15

## 2025-06-19 ASSESSMENT — ENCOUNTER SYMPTOMS
ALLERGIC/IMMUNOLOGIC NEGATIVE: 1
RESPIRATORY NEGATIVE: 1
EYES NEGATIVE: 1
GASTROINTESTINAL NEGATIVE: 1

## 2025-06-19 NOTE — PROGRESS NOTES
MD JAY Martini Board Certified in Sleep Medicine  [facility]ified [facility] [employer] [facility]  Board Certified in Neurology [facility]  [address]   [address]  [place]  [address]  P- [phone]   [facility]   [address]  [address]   [place], [place] [address]           [facility] PHYSICIANS [place] [facility]  [address] [place] [address] [phone]    Subjective:     Patient ID: Jamee Triana is a 59 y.o. female.    Chief Complaint   Patient presents with    New Patient     Headache, insomnia    Fatigue    Daytime Sleepiness       HPI:        Jamee Triana is a 59 y.o. female referred by Dr. Urbano for a sleep evaluation. She complains of tossing and turning, excessive daytime sleepiness, feels sleepy during the day, take naps during the day, fatigue but she denies snoring, snorting, choking, periods of not breathing, knees buckling with laughing, completely or partially paralyzed while falling asleep or waking up.  Symptoms began 3 years ago, gradually worsening since that time.     On Lyrica 50 mg for nerve pain and LBP, only one at the bedtime, also baclofen 10 mg at the bedtime.  Did not like the Trazodone.   SLEEP SCHEDULE: Goes to bed around 12 AM in the weekdays and 12 AM in the weekends. It usually takes the patient 30 minutes to fall asleep. The patient gets up 3 per night to go to the bathroom. The Patient finally gets up at 5:45 AM during the weekdays and 8 AM in the weekends. patient wakes up with dry mouth and sometimes morning headache.. the headache usually dull headache.  The patient has restless sleep with frequent arousals in addition to the Patient has significant daytime sleepiness. The Patient scored [place] Sleepiness Score: 15 on [place] Sleepiness Scale ( more than 10 is indicative of daytime sleepiness)and 44 in fatigue scale ( more than 36 is indicative of daytime

## 2025-06-20 ENCOUNTER — RESULTS FOLLOW-UP (OUTPATIENT)
Dept: PRIMARY CARE CLINIC | Age: 60
End: 2025-06-20

## 2025-06-20 LAB
ANA SER QL IA: NEGATIVE
CRP SERPL-MCNC: <3 MG/L (ref 0–5.1)
ERYTHROCYTE [SEDIMENTATION RATE] IN BLOOD BY WESTERGREN METHOD: 6 MM/HR (ref 0–30)
RHEUMATOID FACT SER IA-ACNC: <10 IU/ML
URATE SERPL-MCNC: 3.7 MG/DL (ref 2.6–6)

## 2025-06-20 ASSESSMENT — ENCOUNTER SYMPTOMS
EYE REDNESS: 0
CHOKING: 0
VOMITING: 0
ABDOMINAL DISTENTION: 0
ALLERGIC/IMMUNOLOGIC NEGATIVE: 1
TROUBLE SWALLOWING: 0
BACK PAIN: 1
COUGH: 0
SINUS PRESSURE: 0
RHINORRHEA: 0
WHEEZING: 0
CONSTIPATION: 0
SHORTNESS OF BREATH: 0
ABDOMINAL PAIN: 0
RECTAL PAIN: 0
BLOOD IN STOOL: 0
ANAL BLEEDING: 0
SORE THROAT: 0
EYE PAIN: 0
PHOTOPHOBIA: 0
NAUSEA: 0
DIARRHEA: 0

## 2025-06-20 NOTE — PROGRESS NOTES
Jamee Triana (:  1965) is a 59 y.o. female,Established patient, here for evaluation of the following chief complaint(s):  Check-Up, Foot Pain (Right foot pain), and Sinus Problem      Assessment & Plan   ASSESSMENT/PLAN:  1. Right foot pain with swelling and tender on the dorsum of the foot but no warmth or erythema to indicate infection.  Exam consistent with plantar fasciitis.  Will give short course of anti-inflammatory and exercises and have patient follow-up with podiatry.  Also screen for inflammatory arthritis.  -     LUIS FELIPE - Edel Hartman DPM, Podiatry, [facility]  -     XR FOOT RIGHT (MIN 3 VIEWS); Future  -     Sedimentation Rate; Future  -     C-Reactive Protein; Future  -     RHEUMATOID FACTOR; Future  -     NORMA Reflex to Antibody Cascade; Future  -     Uric Acid; Future  -     celecoxib (CELEBREX) 200 MG capsule; Take 1 capsule by mouth daily, Disp-30 capsule, R-3Normal  2. Acute pain of left knee without fall or injury.  Mildly swollen.  Concerning for inflammatory arthritis.  Start Celebrex and check inflammatory markers.    -     Sedimentation Rate; Future  -     C-Reactive Protein; Future  -     RHEUMATOID FACTOR; Future  -     NORMA Reflex to Antibody Cascade; Future  -     Uric Acid; Future  -     celecoxib (CELEBREX) 200 MG capsule; Take 1 capsule by mouth daily, Disp-30 capsule, R-3Normal    3.  Snoring with a.m. grogginess, concern for sleep apnea will refer for sleep study.  Order given in April patient states she will schedule the appointment.      Reprint Requisition    Walt Rascon MD, Sleep Medicine, [facility] (Order #[id number]) on 25       Return in about 6 weeks (around 2025).         Subjective   SUBJECTIVE/OBJECTIVE:  HPI  History of Present Illness  The patient presents for a follow-up visit.    Right Foot Pain and Swelling  - Severe pain and swelling in the right foot, described as excruciating upon waking up this morning  - Pain

## 2025-07-14 DIAGNOSIS — L50.9 HIVES: ICD-10-CM

## 2025-07-14 DIAGNOSIS — M48.061 SPINAL STENOSIS OF LUMBAR REGION WITHOUT NEUROGENIC CLAUDICATION: ICD-10-CM

## 2025-07-14 RX ORDER — FEXOFENADINE HCL 180 MG/1
180 TABLET ORAL DAILY
Qty: 90 TABLET | Refills: 1 | Status: CANCELLED | OUTPATIENT
Start: 2025-07-14

## 2025-07-17 RX ORDER — EPINEPHRINE 0.3 MG/.3ML
0.3 INJECTION SUBCUTANEOUS ONCE
Qty: 1 EACH | Refills: 0 | Status: SHIPPED | OUTPATIENT
Start: 2025-07-17 | End: 2025-07-17

## 2025-07-17 RX ORDER — BACLOFEN 10 MG/1
10 TABLET ORAL 3 TIMES DAILY
Qty: 90 TABLET | Refills: 3 | Status: SHIPPED | OUTPATIENT
Start: 2025-07-17

## 2025-08-13 ENCOUNTER — OFFICE VISIT (OUTPATIENT)
Dept: PRIMARY CARE CLINIC | Age: 60
End: 2025-08-13
Payer: COMMERCIAL

## 2025-08-13 VITALS
RESPIRATION RATE: 18 BRPM | BODY MASS INDEX: 28.77 KG/M2 | OXYGEN SATURATION: 98 % | HEART RATE: 70 BPM | SYSTOLIC BLOOD PRESSURE: 104 MMHG | HEIGHT: 66 IN | WEIGHT: 179 LBS | TEMPERATURE: 97.9 F | DIASTOLIC BLOOD PRESSURE: 64 MMHG

## 2025-08-13 DIAGNOSIS — M25.461 PAIN AND SWELLING OF RIGHT KNEE: ICD-10-CM

## 2025-08-13 DIAGNOSIS — M25.561 PAIN AND SWELLING OF RIGHT KNEE: ICD-10-CM

## 2025-08-13 DIAGNOSIS — S96.911S: ICD-10-CM

## 2025-08-13 DIAGNOSIS — M54.31 SCIATICA, RIGHT SIDE: Primary | ICD-10-CM

## 2025-08-13 PROCEDURE — 99214 OFFICE O/P EST MOD 30 MIN: CPT | Performed by: INTERNAL MEDICINE

## 2025-08-22 DIAGNOSIS — Z78.0 MENOPAUSE: ICD-10-CM

## 2025-08-23 DIAGNOSIS — Z78.0 MENOPAUSE: ICD-10-CM

## 2025-08-23 RX ORDER — ESTERIFIED ESTROGENS AND METHYLTESTOSTERONE .625; 1.25 MG/1; MG/1
TABLET ORAL
Qty: 90 TABLET | Refills: 0 | OUTPATIENT
Start: 2025-08-23

## 2025-08-23 RX ORDER — ESTERIFIED ESTROGENS AND METHYLTESTOSTERONE .625; 1.25 MG/1; MG/1
1 TABLET ORAL DAILY
Qty: 90 TABLET | Refills: 1 | Status: SHIPPED | OUTPATIENT
Start: 2025-08-23

## 2025-08-29 DIAGNOSIS — M54.31 SCIATICA, RIGHT SIDE: Primary | ICD-10-CM

## 2025-08-29 RX ORDER — METHYLPREDNISOLONE 4 MG/1
TABLET ORAL
Qty: 21 KIT | Refills: 0 | Status: SHIPPED | OUTPATIENT
Start: 2025-08-29 | End: 2025-09-04

## (undated) DEVICE — PORT VLV 2 W NDL FREE SMRTSITE

## (undated) DEVICE — Device: Brand: JELCO

## (undated) DEVICE — UNIVERSAL BLOCK TRAY: Brand: AVANOS*

## (undated) DEVICE — CONMED PEDIATRIC GASTROSCOPE SHEATHED CYTOLOGY BRUSH, RING HANDLE, 3 MM X 160 CM: Brand: CONMED

## (undated) DEVICE — CHLORAPREP 26ML ORANGE

## (undated) DEVICE — LAPAROTOMY DRAPE WITH POUCHES: Brand: CONVERTORS

## (undated) DEVICE — MEDIA CONTRAST RX ISOVUE-300 61% 30ML VIALS

## (undated) DEVICE — PEN: MARKING STD 100/CS: Brand: MEDICAL ACTION INDUSTRIES

## (undated) DEVICE — DISPOSABLE OR TOWEL: Brand: CARDINAL HEALTH

## (undated) DEVICE — TOWEL,OR,DSP,ST,BLUE,STD,4/PK,20PK/CS: Brand: MEDLINE

## (undated) DEVICE — 3M™ TEGADERM™ TRANSPARENT FILM DRESSING FRAME STYLE, 1628, 6 IN X 8 IN (15 CM X 20 CM), 10/CT 8CT/CASE: Brand: 3M™ TEGADERM™

## (undated) DEVICE — STERILE POLYISOPRENE POWDER-FREE SURGICAL GLOVES: Brand: PROTEXIS

## (undated) DEVICE — SENZA®  PATIENT TRIAL KIT: Brand: SENZA®

## (undated) DEVICE — STANDARD HYPODERMIC NEEDLE,POLYPROPYLENE HUB: Brand: MONOJECT

## (undated) DEVICE — NEEDLE EPI L3.5IN OD20GA CLR POLYCARB HUB WNG N DEHP TUOHY

## (undated) DEVICE — FASTENER CATH SM 5-14 FR STRL

## (undated) DEVICE — SYRINGE MED 3ML CLR PLAS STD N CTRL LUERLOCK TIP DISP

## (undated) DEVICE — SET GRAV VENT NVENT CK VLV 3 NDL FREE PRT 10 GTT

## (undated) DEVICE — FORCEPS BX L240CM DIA2.4MM L NDL RAD JAW 4 133340

## (undated) DEVICE — NEEDLE SPNL 22GA L3.5IN BLK HUB S STL REG WALL FIT STYL W/

## (undated) DEVICE — STERILE DISPOSABLE EQUIPMENT COVER - DOME COVER 22" DEPTH: Brand: PREFERRED MEDICAL PRODUCTS, LLC

## (undated) DEVICE — NEEDLE EPI 14GA L15CM FOR SPNL CRD STIM

## (undated) DEVICE — SET EXTN L7IN PRIMING VOL 0.5ML PRSS RATE STD BOR 1 REM

## (undated) DEVICE — ADHESIVE SKIN CLSR 0.7ML TOP DERMBND ADV

## (undated) DEVICE — Z CONVERTED USE 2275871 SPONGE GZ W4XL4IN WHT 8 PLY CURITY

## (undated) DEVICE — SHEET,DRAPE,53X77,STERILE: Brand: MEDLINE

## (undated) DEVICE — INTENDED FOR TISSUE SEPARATION, AND OTHER PROCEDURES THAT REQUIRE A SHARP SURGICAL BLADE TO PUNCTURE OR CUT.: Brand: BARD-PARKER ® DISPOSABLE SCALPELS

## (undated) DEVICE — 3M™ STERI-STRIP™ REINFORCED ADHESIVE SKIN CLOSURES, R1547, 1/2 IN X 4 IN (12 MM X 100 MM), 6 STRIPS/ENVELOPE: Brand: 3M™ STERI-STRIP™

## (undated) DEVICE — SET ADMIN PRIMING 7ML L30IN 7.35LB 20 GTT 2ND RLER CLMP

## (undated) DEVICE — KIT NDL INSRT 14GA L4IN CRV TIP

## (undated) DEVICE — IV START KIT: Brand: MEDLINE INDUSTRIES, INC.